# Patient Record
Sex: FEMALE | Race: WHITE | NOT HISPANIC OR LATINO
[De-identification: names, ages, dates, MRNs, and addresses within clinical notes are randomized per-mention and may not be internally consistent; named-entity substitution may affect disease eponyms.]

---

## 2017-01-07 ENCOUNTER — TRANSCRIPTION ENCOUNTER (OUTPATIENT)
Age: 52
End: 2017-01-07

## 2017-01-22 ENCOUNTER — TRANSCRIPTION ENCOUNTER (OUTPATIENT)
Age: 52
End: 2017-01-22

## 2017-02-22 ENCOUNTER — MEDICATION RENEWAL (OUTPATIENT)
Age: 52
End: 2017-02-22

## 2017-03-15 ENCOUNTER — APPOINTMENT (OUTPATIENT)
Dept: CARDIOLOGY | Facility: CLINIC | Age: 52
End: 2017-03-15

## 2017-03-15 ENCOUNTER — NON-APPOINTMENT (OUTPATIENT)
Age: 52
End: 2017-03-15

## 2017-03-15 VITALS
OXYGEN SATURATION: 99 % | SYSTOLIC BLOOD PRESSURE: 108 MMHG | HEART RATE: 64 BPM | WEIGHT: 113 LBS | BODY MASS INDEX: 19.29 KG/M2 | DIASTOLIC BLOOD PRESSURE: 70 MMHG | HEIGHT: 64 IN

## 2017-04-02 ENCOUNTER — TRANSCRIPTION ENCOUNTER (OUTPATIENT)
Age: 52
End: 2017-04-02

## 2017-04-06 ENCOUNTER — LABORATORY RESULT (OUTPATIENT)
Age: 52
End: 2017-04-06

## 2017-04-10 LAB
ALBUMIN SERPL ELPH-MCNC: 4.7 G/DL
ALP BLD-CCNC: 74 U/L
ALT SERPL-CCNC: 14 U/L
ANION GAP SERPL CALC-SCNC: 15 MMOL/L
AST SERPL-CCNC: 28 U/L
BASOPHILS # BLD AUTO: 0.05 K/UL
BASOPHILS NFR BLD AUTO: 0.7 %
BILIRUB SERPL-MCNC: 0.3 MG/DL
BUN SERPL-MCNC: 12 MG/DL
CALCIUM SERPL-MCNC: 9.8 MG/DL
CARDIOLIPIN AB SER IA-ACNC: POSITIVE
CARDIOLIPIN IGM SER-MCNC: 31 MPL
CARDIOLIPIN IGM SER-MCNC: <5 GPL
CHLORIDE SERPL-SCNC: 101 MMOL/L
CHOLEST SERPL-MCNC: 223 MG/DL
CHOLEST/HDLC SERPL: 2.3 RATIO
CO2 SERPL-SCNC: 27 MMOL/L
CREAT SERPL-MCNC: 0.83 MG/DL
CRP SERPL HS-MCNC: <0.2 MG/L
EOSINOPHIL # BLD AUTO: 0.22 K/UL
EOSINOPHIL NFR BLD AUTO: 3 %
GLUCOSE SERPL-MCNC: 71 MG/DL
HBA1C MFR BLD HPLC: 5.4 %
HCT VFR BLD CALC: 42.5 %
HDLC SERPL-MCNC: 99 MG/DL
HGB BLD-MCNC: 14.2 G/DL
IMM GRANULOCYTES NFR BLD AUTO: 0.1 %
LDLC SERPL CALC-MCNC: 106 MG/DL
LYMPHOCYTES # BLD AUTO: 2.56 K/UL
LYMPHOCYTES NFR BLD AUTO: 34.8 %
MAN DIFF?: NORMAL
MCHC RBC-ENTMCNC: 30.2 PG
MCHC RBC-ENTMCNC: 33.4 GM/DL
MCV RBC AUTO: 90.4 FL
MONOCYTES # BLD AUTO: 0.66 K/UL
MONOCYTES NFR BLD AUTO: 9 %
NEUTROPHILS # BLD AUTO: 3.86 K/UL
NEUTROPHILS NFR BLD AUTO: 52.4 %
PLATELET # BLD AUTO: 259 K/UL
POTASSIUM SERPL-SCNC: 4.7 MMOL/L
PROT SERPL-MCNC: 7.1 G/DL
RBC # BLD: 4.7 M/UL
RBC # FLD: 12.9 %
SODIUM SERPL-SCNC: 143 MMOL/L
T3RU NFR SERPL: 1.03 INDEX
T4 SERPL-MCNC: 6.2 UG/DL
TRIGL SERPL-MCNC: 91 MG/DL
TSH SERPL-ACNC: 1.55 UIU/ML
WBC # FLD AUTO: 7.36 K/UL

## 2017-07-11 ENCOUNTER — APPOINTMENT (OUTPATIENT)
Dept: CARDIOLOGY | Facility: CLINIC | Age: 52
End: 2017-07-11

## 2017-07-11 ENCOUNTER — NON-APPOINTMENT (OUTPATIENT)
Age: 52
End: 2017-07-11

## 2017-07-11 VITALS — OXYGEN SATURATION: 99 % | DIASTOLIC BLOOD PRESSURE: 68 MMHG | SYSTOLIC BLOOD PRESSURE: 105 MMHG | HEART RATE: 61 BPM

## 2017-07-12 ENCOUNTER — RESULT REVIEW (OUTPATIENT)
Age: 52
End: 2017-07-12

## 2017-07-13 ENCOUNTER — FORM ENCOUNTER (OUTPATIENT)
Age: 52
End: 2017-07-13

## 2017-07-13 ENCOUNTER — APPOINTMENT (OUTPATIENT)
Dept: MRI IMAGING | Facility: CLINIC | Age: 52
End: 2017-07-13

## 2017-07-14 ENCOUNTER — APPOINTMENT (OUTPATIENT)
Dept: MRI IMAGING | Facility: HOSPITAL | Age: 52
End: 2017-07-14

## 2017-07-14 ENCOUNTER — OUTPATIENT (OUTPATIENT)
Dept: OUTPATIENT SERVICES | Facility: HOSPITAL | Age: 52
LOS: 1 days | End: 2017-07-14
Payer: COMMERCIAL

## 2017-07-14 DIAGNOSIS — I77.9 DISORDER OF ARTERIES AND ARTERIOLES, UNSPECIFIED: ICD-10-CM

## 2017-07-14 DIAGNOSIS — Z90.49 ACQUIRED ABSENCE OF OTHER SPECIFIED PARTS OF DIGESTIVE TRACT: Chronic | ICD-10-CM

## 2017-07-14 DIAGNOSIS — M54.2 CERVICALGIA: ICD-10-CM

## 2017-07-14 DIAGNOSIS — G93.9 DISORDER OF BRAIN, UNSPECIFIED: ICD-10-CM

## 2017-07-14 PROCEDURE — 70546 MR ANGIOGRAPH HEAD W/O&W/DYE: CPT

## 2017-07-14 PROCEDURE — 70549 MR ANGIOGRAPH NECK W/O&W/DYE: CPT | Mod: 26

## 2017-07-14 PROCEDURE — 70549 MR ANGIOGRAPH NECK W/O&W/DYE: CPT

## 2017-07-14 PROCEDURE — A9585: CPT

## 2017-07-14 PROCEDURE — 70546 MR ANGIOGRAPH HEAD W/O&W/DYE: CPT | Mod: 26

## 2017-07-18 ENCOUNTER — RX RENEWAL (OUTPATIENT)
Age: 52
End: 2017-07-18

## 2017-07-27 ENCOUNTER — APPOINTMENT (OUTPATIENT)
Dept: SURGICAL ONCOLOGY | Facility: CLINIC | Age: 52
End: 2017-07-27
Payer: COMMERCIAL

## 2017-07-27 VITALS
BODY MASS INDEX: 19.12 KG/M2 | SYSTOLIC BLOOD PRESSURE: 100 MMHG | TEMPERATURE: 98.5 F | WEIGHT: 112 LBS | HEIGHT: 64 IN | DIASTOLIC BLOOD PRESSURE: 67 MMHG | OXYGEN SATURATION: 100 % | RESPIRATION RATE: 16 BRPM | HEART RATE: 64 BPM

## 2017-07-27 DIAGNOSIS — N61.0 MASTITIS WITHOUT ABSCESS: ICD-10-CM

## 2017-07-27 PROCEDURE — 99214 OFFICE O/P EST MOD 30 MIN: CPT

## 2017-08-16 ENCOUNTER — MEDICATION RENEWAL (OUTPATIENT)
Age: 52
End: 2017-08-16

## 2017-08-28 ENCOUNTER — EMERGENCY (EMERGENCY)
Facility: HOSPITAL | Age: 52
LOS: 1 days | Discharge: HOME | End: 2017-08-28
Attending: EMERGENCY MEDICINE | Admitting: EMERGENCY MEDICINE
Payer: COMMERCIAL

## 2017-08-28 VITALS
HEART RATE: 58 BPM | OXYGEN SATURATION: 100 % | RESPIRATION RATE: 20 BRPM | DIASTOLIC BLOOD PRESSURE: 62 MMHG | SYSTOLIC BLOOD PRESSURE: 106 MMHG | TEMPERATURE: 100 F

## 2017-08-28 DIAGNOSIS — Z90.49 ACQUIRED ABSENCE OF OTHER SPECIFIED PARTS OF DIGESTIVE TRACT: Chronic | ICD-10-CM

## 2017-08-28 PROCEDURE — 99284 EMERGENCY DEPT VISIT MOD MDM: CPT | Mod: 25

## 2017-08-28 PROCEDURE — 99284 EMERGENCY DEPT VISIT MOD MDM: CPT

## 2017-08-28 PROCEDURE — 93971 EXTREMITY STUDY: CPT

## 2017-08-28 PROCEDURE — 93971 EXTREMITY STUDY: CPT | Mod: 26

## 2017-08-28 NOTE — ED ADULT NURSE NOTE - OBJECTIVE STATEMENT
51 y.o female extensive cardiac hx- with MI in passed, carotid artery dissection, + cardiolipin antibodies currently on ASA daily c/o R. hand bruising and swelling x 1 day with no known injury. pt states she noticed this am the top of her right hand had mild bruising with not injury or trauma to the hand. pt states she was at the gym this evening lifting 5 lb weights which is not something new in pts exercise routine when she noticed swelling on the top of her right hand at the area of the bruise. pt states the swelling happened suddenly, painful upon palpation. was concerned with her pmh that she could possibly have a clot, denies any pain upon movement of the r. hand or wrist. + and equal radial pulses, sensory, and warmth present bilaterally. VS stable. no other pain or discomfort. provided pt with ice packs and offered pain medication but denies the need for pain medication at this time. call bell within reach and at the bedside. safety precautions maintained.

## 2017-08-28 NOTE — ED PROVIDER NOTE - MEDICAL DECISION MAKING DETAILS
Francis: Patient with right hand hematoma x 1 day. history of antiphospholipid. denies trauma. on daily aspirin.  stopped it for one week and restarted it four days ago. will get doppler to r/o dvt. reassess

## 2017-08-28 NOTE — ED PROVIDER NOTE - OBJECTIVE STATEMENT
51 year old female presents c/o right hand bruising and pain x 1 day. Patient states that she noticed bruising on her right hand this morning. She describes progressive swelling to the area throughout the day. Patient w/ h/o +anti-cardiolipin ab for which she takes aspirin 325 daily for. Patient denies trauma to her hand states she is concerned about possible blood clot given her history.

## 2017-08-28 NOTE — ED ADULT NURSE NOTE - PMH
Anticardiolipin Antibody Positive    CAD (Coronary Artery Disease)    History of Appendectomy    History of Bilateral Breast Implants    History of Hernia Repair    Myocardial Infarct    S/P Botox Injection

## 2017-08-28 NOTE — ED PROVIDER NOTE - PHYSICAL EXAMINATION
Patient w/ ecchymosis, swelling and tenderness  along the distal 4th metacarpal. Patient neurovascularly intact.

## 2017-09-14 ENCOUNTER — APPOINTMENT (OUTPATIENT)
Dept: CARDIOLOGY | Facility: CLINIC | Age: 52
End: 2017-09-14

## 2017-09-22 ENCOUNTER — TRANSCRIPTION ENCOUNTER (OUTPATIENT)
Age: 52
End: 2017-09-22

## 2017-10-24 ENCOUNTER — TRANSCRIPTION ENCOUNTER (OUTPATIENT)
Age: 52
End: 2017-10-24

## 2017-10-24 ENCOUNTER — FORM ENCOUNTER (OUTPATIENT)
Age: 52
End: 2017-10-24

## 2017-10-25 ENCOUNTER — APPOINTMENT (OUTPATIENT)
Dept: ULTRASOUND IMAGING | Facility: CLINIC | Age: 52
End: 2017-10-25
Payer: COMMERCIAL

## 2017-10-25 ENCOUNTER — OUTPATIENT (OUTPATIENT)
Dept: OUTPATIENT SERVICES | Facility: HOSPITAL | Age: 52
LOS: 1 days | End: 2017-10-25
Payer: COMMERCIAL

## 2017-10-25 ENCOUNTER — APPOINTMENT (OUTPATIENT)
Dept: MAMMOGRAPHY | Facility: CLINIC | Age: 52
End: 2017-10-25
Payer: COMMERCIAL

## 2017-10-25 DIAGNOSIS — Z00.8 ENCOUNTER FOR OTHER GENERAL EXAMINATION: ICD-10-CM

## 2017-10-25 DIAGNOSIS — Z90.49 ACQUIRED ABSENCE OF OTHER SPECIFIED PARTS OF DIGESTIVE TRACT: Chronic | ICD-10-CM

## 2017-10-25 PROCEDURE — 76641 ULTRASOUND BREAST COMPLETE: CPT | Mod: 26,50

## 2017-10-25 PROCEDURE — 76641 ULTRASOUND BREAST COMPLETE: CPT

## 2017-10-25 PROCEDURE — 77067 SCR MAMMO BI INCL CAD: CPT

## 2017-10-25 PROCEDURE — 77063 BREAST TOMOSYNTHESIS BI: CPT | Mod: 26

## 2017-10-25 PROCEDURE — G0202: CPT | Mod: 26

## 2017-10-25 PROCEDURE — 77063 BREAST TOMOSYNTHESIS BI: CPT

## 2017-11-08 ENCOUNTER — APPOINTMENT (OUTPATIENT)
Dept: CARDIOLOGY | Facility: CLINIC | Age: 52
End: 2017-11-08

## 2017-11-09 ENCOUNTER — APPOINTMENT (OUTPATIENT)
Dept: CARDIOLOGY | Facility: CLINIC | Age: 52
End: 2017-11-09
Payer: COMMERCIAL

## 2017-11-29 ENCOUNTER — APPOINTMENT (OUTPATIENT)
Dept: CARDIOLOGY | Facility: CLINIC | Age: 52
End: 2017-11-29
Payer: COMMERCIAL

## 2017-11-29 ENCOUNTER — OTHER (OUTPATIENT)
Age: 52
End: 2017-11-29

## 2017-11-29 ENCOUNTER — TRANSCRIPTION ENCOUNTER (OUTPATIENT)
Age: 52
End: 2017-11-29

## 2017-11-29 PROCEDURE — 93268 ECG RECORD/REVIEW: CPT

## 2017-12-05 ENCOUNTER — APPOINTMENT (OUTPATIENT)
Dept: CARDIOLOGY | Facility: CLINIC | Age: 52
End: 2017-12-05
Payer: COMMERCIAL

## 2017-12-05 PROCEDURE — 93015 CV STRESS TEST SUPVJ I&R: CPT

## 2018-01-08 ENCOUNTER — NON-APPOINTMENT (OUTPATIENT)
Age: 53
End: 2018-01-08

## 2018-01-08 ENCOUNTER — APPOINTMENT (OUTPATIENT)
Dept: CARDIOLOGY | Facility: CLINIC | Age: 53
End: 2018-01-08
Payer: COMMERCIAL

## 2018-01-08 VITALS
OXYGEN SATURATION: 97 % | BODY MASS INDEX: 18.78 KG/M2 | SYSTOLIC BLOOD PRESSURE: 122 MMHG | HEIGHT: 64 IN | HEART RATE: 60 BPM | DIASTOLIC BLOOD PRESSURE: 84 MMHG | WEIGHT: 110 LBS

## 2018-01-08 PROCEDURE — 99215 OFFICE O/P EST HI 40 MIN: CPT

## 2018-01-08 PROCEDURE — 93000 ELECTROCARDIOGRAM COMPLETE: CPT

## 2018-01-23 ENCOUNTER — TRANSCRIPTION ENCOUNTER (OUTPATIENT)
Age: 53
End: 2018-01-23

## 2018-03-01 ENCOUNTER — APPOINTMENT (OUTPATIENT)
Dept: OBGYN | Facility: CLINIC | Age: 53
End: 2018-03-01
Payer: COMMERCIAL

## 2018-03-01 PROCEDURE — 99204 OFFICE O/P NEW MOD 45 MIN: CPT

## 2018-03-20 ENCOUNTER — APPOINTMENT (OUTPATIENT)
Dept: OBGYN | Facility: CLINIC | Age: 53
End: 2018-03-20

## 2018-04-04 ENCOUNTER — APPOINTMENT (OUTPATIENT)
Dept: ORTHOPEDIC SURGERY | Facility: CLINIC | Age: 53
End: 2018-04-04
Payer: COMMERCIAL

## 2018-04-04 VITALS
SYSTOLIC BLOOD PRESSURE: 104 MMHG | TEMPERATURE: 98.1 F | HEART RATE: 66 BPM | DIASTOLIC BLOOD PRESSURE: 67 MMHG | BODY MASS INDEX: 18.78 KG/M2 | WEIGHT: 110 LBS | HEIGHT: 64 IN

## 2018-04-04 PROCEDURE — 99204 OFFICE O/P NEW MOD 45 MIN: CPT

## 2018-04-04 PROCEDURE — 73140 X-RAY EXAM OF FINGER(S): CPT | Mod: F9

## 2018-06-08 ENCOUNTER — APPOINTMENT (OUTPATIENT)
Dept: OBGYN | Facility: CLINIC | Age: 53
End: 2018-06-08
Payer: COMMERCIAL

## 2018-06-08 PROCEDURE — 99214 OFFICE O/P EST MOD 30 MIN: CPT

## 2018-07-02 ENCOUNTER — APPOINTMENT (OUTPATIENT)
Dept: CARDIOLOGY | Facility: CLINIC | Age: 53
End: 2018-07-02
Payer: COMMERCIAL

## 2018-07-02 ENCOUNTER — NON-APPOINTMENT (OUTPATIENT)
Age: 53
End: 2018-07-02

## 2018-07-02 VITALS
HEART RATE: 59 BPM | HEIGHT: 64 IN | OXYGEN SATURATION: 100 % | BODY MASS INDEX: 18.78 KG/M2 | DIASTOLIC BLOOD PRESSURE: 71 MMHG | SYSTOLIC BLOOD PRESSURE: 110 MMHG | WEIGHT: 110 LBS

## 2018-07-02 PROCEDURE — 99214 OFFICE O/P EST MOD 30 MIN: CPT

## 2018-07-02 PROCEDURE — 93000 ELECTROCARDIOGRAM COMPLETE: CPT

## 2018-07-29 ENCOUNTER — FORM ENCOUNTER (OUTPATIENT)
Age: 53
End: 2018-07-29

## 2018-07-30 ENCOUNTER — OUTPATIENT (OUTPATIENT)
Dept: OUTPATIENT SERVICES | Facility: HOSPITAL | Age: 53
LOS: 1 days | End: 2018-07-30
Payer: COMMERCIAL

## 2018-07-30 ENCOUNTER — APPOINTMENT (OUTPATIENT)
Dept: CARDIOLOGY | Facility: CLINIC | Age: 53
End: 2018-07-30
Payer: COMMERCIAL

## 2018-07-30 VITALS
HEART RATE: 64 BPM | DIASTOLIC BLOOD PRESSURE: 66 MMHG | OXYGEN SATURATION: 100 % | SYSTOLIC BLOOD PRESSURE: 96 MMHG | HEIGHT: 64 IN

## 2018-07-30 DIAGNOSIS — Z90.49 ACQUIRED ABSENCE OF OTHER SPECIFIED PARTS OF DIGESTIVE TRACT: Chronic | ICD-10-CM

## 2018-07-30 DIAGNOSIS — M54.2 CERVICALGIA: ICD-10-CM

## 2018-07-30 PROCEDURE — 99214 OFFICE O/P EST MOD 30 MIN: CPT

## 2018-07-30 PROCEDURE — 93880 EXTRACRANIAL BILAT STUDY: CPT | Mod: 26

## 2018-07-30 PROCEDURE — 93880 EXTRACRANIAL BILAT STUDY: CPT

## 2018-09-11 ENCOUNTER — RESULT REVIEW (OUTPATIENT)
Age: 53
End: 2018-09-11

## 2018-09-14 ENCOUNTER — APPOINTMENT (OUTPATIENT)
Dept: UROLOGY | Facility: CLINIC | Age: 53
End: 2018-09-14

## 2018-09-16 ENCOUNTER — TRANSCRIPTION ENCOUNTER (OUTPATIENT)
Age: 53
End: 2018-09-16

## 2018-09-21 ENCOUNTER — APPOINTMENT (OUTPATIENT)
Dept: DERMATOLOGY | Facility: CLINIC | Age: 53
End: 2018-09-21

## 2018-09-26 ENCOUNTER — APPOINTMENT (OUTPATIENT)
Dept: UROLOGY | Facility: CLINIC | Age: 53
End: 2018-09-26

## 2018-10-03 ENCOUNTER — MEDICATION RENEWAL (OUTPATIENT)
Age: 53
End: 2018-10-03

## 2018-10-25 ENCOUNTER — APPOINTMENT (OUTPATIENT)
Dept: MRI IMAGING | Facility: CLINIC | Age: 53
End: 2018-10-25
Payer: COMMERCIAL

## 2018-10-25 ENCOUNTER — OUTPATIENT (OUTPATIENT)
Dept: OUTPATIENT SERVICES | Facility: HOSPITAL | Age: 53
LOS: 1 days | End: 2018-10-25
Payer: COMMERCIAL

## 2018-10-25 DIAGNOSIS — Z00.8 ENCOUNTER FOR OTHER GENERAL EXAMINATION: ICD-10-CM

## 2018-10-25 DIAGNOSIS — Z90.49 ACQUIRED ABSENCE OF OTHER SPECIFIED PARTS OF DIGESTIVE TRACT: Chronic | ICD-10-CM

## 2018-10-25 PROCEDURE — 72197 MRI PELVIS W/O & W/DYE: CPT

## 2018-10-25 PROCEDURE — 82565 ASSAY OF CREATININE: CPT

## 2018-10-25 PROCEDURE — A9585: CPT

## 2018-10-25 PROCEDURE — 72197 MRI PELVIS W/O & W/DYE: CPT | Mod: 26

## 2018-10-26 ENCOUNTER — APPOINTMENT (OUTPATIENT)
Dept: UROLOGY | Facility: CLINIC | Age: 53
End: 2018-10-26

## 2018-11-19 ENCOUNTER — RX RENEWAL (OUTPATIENT)
Age: 53
End: 2018-11-19

## 2018-11-27 ENCOUNTER — APPOINTMENT (OUTPATIENT)
Dept: OBGYN | Facility: CLINIC | Age: 53
End: 2018-11-27
Payer: COMMERCIAL

## 2018-11-27 PROCEDURE — 36415 COLL VENOUS BLD VENIPUNCTURE: CPT

## 2018-11-27 PROCEDURE — 99214 OFFICE O/P EST MOD 30 MIN: CPT

## 2018-12-05 ENCOUNTER — APPOINTMENT (OUTPATIENT)
Dept: GASTROENTEROLOGY | Facility: CLINIC | Age: 53
End: 2018-12-05
Payer: COMMERCIAL

## 2018-12-05 VITALS
HEIGHT: 64 IN | RESPIRATION RATE: 14 BRPM | TEMPERATURE: 97.5 F | DIASTOLIC BLOOD PRESSURE: 62 MMHG | SYSTOLIC BLOOD PRESSURE: 98 MMHG | WEIGHT: 114 LBS | BODY MASS INDEX: 19.46 KG/M2 | HEART RATE: 66 BPM | OXYGEN SATURATION: 99 %

## 2018-12-05 PROCEDURE — 99214 OFFICE O/P EST MOD 30 MIN: CPT

## 2018-12-10 ENCOUNTER — MESSAGE (OUTPATIENT)
Age: 53
End: 2018-12-10

## 2018-12-19 ENCOUNTER — MESSAGE (OUTPATIENT)
Age: 53
End: 2018-12-19

## 2018-12-20 ENCOUNTER — CLINICAL ADVICE (OUTPATIENT)
Age: 53
End: 2018-12-20

## 2018-12-20 ENCOUNTER — NON-APPOINTMENT (OUTPATIENT)
Age: 53
End: 2018-12-20

## 2018-12-20 ENCOUNTER — FORM ENCOUNTER (OUTPATIENT)
Age: 53
End: 2018-12-20

## 2018-12-20 ENCOUNTER — APPOINTMENT (OUTPATIENT)
Dept: CARDIOLOGY | Facility: CLINIC | Age: 53
End: 2018-12-20
Payer: COMMERCIAL

## 2018-12-20 VITALS
HEART RATE: 65 BPM | BODY MASS INDEX: 19.63 KG/M2 | WEIGHT: 115 LBS | SYSTOLIC BLOOD PRESSURE: 113 MMHG | HEIGHT: 64 IN | OXYGEN SATURATION: 100 % | DIASTOLIC BLOOD PRESSURE: 73 MMHG

## 2018-12-20 PROCEDURE — 93000 ELECTROCARDIOGRAM COMPLETE: CPT

## 2018-12-20 PROCEDURE — 99214 OFFICE O/P EST MOD 30 MIN: CPT

## 2018-12-20 NOTE — HISTORY OF PRESENT ILLNESS
[FreeTextEntry1] : Mrs. Stokes is a 52-year-old woman with history of anticardiolipin antibody, an anterior wall MI, with coronary angiography that was typical for apical ballooning syndrome, however it was felt to be secondary to vasospasm. She has had normal LV function since then.  She also has a history of bilateral carotid dissections that completely healed, as well as a recent diagnosis of FMD of the right renal artery, and Hashimoto's thyroiditis.  She had previously been on AC with Coumadin, but is currently only on aspirin therapy.  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 She denied increased stress and has been developing pains in her back and pains in her chest. It occurred randomly and not related to physical activity. She also is on medication for her stomach. The symptoms do not sound or feel like what she had with the heart attack in 2005. We know that she has normal coronaries and has had normal stress test\par \par Her resting 12-lead cardiogram demonstrates sinus rhythm without any acute change.\par . \par She had an echocardiogram 7/16 that showed normal left ventricular systolic function. Blood work from 3/17 demonstrated a cholesterol of 223, triglycerides 91, HDL 99, and . CRP was less than 0.2. Anticardiolipin antibody IgM was elevated at 31.She had a nuclear stress test 12/17 that was normal total workload of 12 Mets.\par \par MRA scan of the neck 7/17 was normal. She had a CT angiogram of the heart 3/16 showed a calcium score 0 without any coronary disease.She had a recent carotid Doppler that according to the patient demonstrates mild plaque.\par \par A resting 12-lead electrocardiogram demonstrates sinus rhythm. There is poor R. progression in the anterior leads with nonspecific T waves. This represents no acute change.\par \par

## 2018-12-20 NOTE — REVIEW OF SYSTEMS
[Feeling Fatigued] : feeling fatigued [see HPI] : see HPI [Nausea] : nausea [Negative] : Heme/Lymph [Shortness Of Breath] : no shortness of breath [Chest Pain] : no chest pain [Lower Ext Edema] : no extremity edema [Palpitations] : no palpitations [Dysuria] : no dysuria [Incontinence] : no incontinence

## 2018-12-20 NOTE — CARDIOLOGY SUMMARY
[No Ischemia] : no Ischemia [LVEF ___%] : LVEF [unfilled]% [Normal] : normal LA size [None] : no mitral regurgitation [___] : [unfilled] [___] : [unfilled]

## 2018-12-20 NOTE — REASON FOR VISIT
[Follow-Up - Clinic] : a clinic follow-up of [Prior Myocardial Infarction] : a prior myocardial infarction [Syncope] : syncope [FreeTextEntry1] : Antiphospholipid antibody, Takotsubo syndrome

## 2018-12-20 NOTE — DISCUSSION/SUMMARY
[FreeTextEntry1] : The patient's symptoms of back and chest pain are most likely stress induced. The pains occur randomly and not related to any physical or emotional stress. They only last for a short period of time. Her exam and ECG are unchanged. Vital signs are stable\par \par I reassured her that this is not cardiac. If she has further symptoms she will investigate with her other doctors. She had blood work with the rheumatologist in July with extensive testing of her rheumatologic and autoimmune system that were unremarkable.\par \par If she continues to have symptoms she will call me.

## 2018-12-21 ENCOUNTER — RESULT REVIEW (OUTPATIENT)
Age: 53
End: 2018-12-21

## 2018-12-21 ENCOUNTER — MESSAGE (OUTPATIENT)
Age: 53
End: 2018-12-21

## 2018-12-21 ENCOUNTER — APPOINTMENT (OUTPATIENT)
Dept: CT IMAGING | Facility: CLINIC | Age: 53
End: 2018-12-21
Payer: COMMERCIAL

## 2018-12-21 ENCOUNTER — TRANSCRIPTION ENCOUNTER (OUTPATIENT)
Age: 53
End: 2018-12-21

## 2018-12-21 ENCOUNTER — OUTPATIENT (OUTPATIENT)
Dept: OUTPATIENT SERVICES | Facility: HOSPITAL | Age: 53
LOS: 1 days | End: 2018-12-21
Payer: COMMERCIAL

## 2018-12-21 DIAGNOSIS — Z90.49 ACQUIRED ABSENCE OF OTHER SPECIFIED PARTS OF DIGESTIVE TRACT: Chronic | ICD-10-CM

## 2018-12-21 DIAGNOSIS — Z00.8 ENCOUNTER FOR OTHER GENERAL EXAMINATION: ICD-10-CM

## 2018-12-21 PROCEDURE — 74176 CT ABD & PELVIS W/O CONTRAST: CPT | Mod: 26

## 2018-12-21 PROCEDURE — 74176 CT ABD & PELVIS W/O CONTRAST: CPT

## 2019-01-02 ENCOUNTER — APPOINTMENT (OUTPATIENT)
Dept: OBGYN | Facility: CLINIC | Age: 54
End: 2019-01-02

## 2019-01-14 ENCOUNTER — OTHER (OUTPATIENT)
Age: 54
End: 2019-01-14

## 2019-01-14 ENCOUNTER — APPOINTMENT (OUTPATIENT)
Dept: GASTROENTEROLOGY | Facility: CLINIC | Age: 54
End: 2019-01-14
Payer: COMMERCIAL

## 2019-01-14 VITALS
WEIGHT: 115 LBS | HEART RATE: 65 BPM | BODY MASS INDEX: 19.74 KG/M2 | TEMPERATURE: 97.6 F | DIASTOLIC BLOOD PRESSURE: 74 MMHG | OXYGEN SATURATION: 99 % | SYSTOLIC BLOOD PRESSURE: 110 MMHG

## 2019-01-14 PROCEDURE — 99214 OFFICE O/P EST MOD 30 MIN: CPT

## 2019-01-14 NOTE — ASSESSMENT
[FreeTextEntry1] : This is a 53-year-old female with chronic constipation, most probable IBS component. I recommend Linzess 290 mcg one hour before breakfast, miralax with dinner, dulcolax or senna as needed. I recommend anorectal manometry to evaluate for possible dyssynergy defecation. She may need pelvic floor rehab pending result of anorectal manometry. She reports that she had pelvic floor rehab once for painful sexual intercourse but did not go back.

## 2019-01-14 NOTE — HISTORY OF PRESENT ILLNESS
[FreeTextEntry1] : Larissa Since her followup visit. She reports continued constipation. However on further questioning she states that when she went back on Linzess it did give her diarrhea. When she was in [place] she took it with good response. But then she goes back saying that she has constipation, feeling bloated and uncomfortable. There are times when she can go for a week without having a bowel movement, but then she goes back to saying that she did have diarrhea a few days ago. I reviewed with the results of CT abdomen and pelvis without IV contrast in December showing no bowel obstruction. She underwent a colonoscopy 2016 with a tubular adenoma, redundant colon. Repeat a colonoscopy at this time would not change or constipation. I recommend anorectal manometry for further evaluation of the constipation. Review of her chart reveals blood work performed July 2018 with normal electrolytes and TSH. She admits to be under stress due to work and home situation.

## 2019-01-14 NOTE — PHYSICAL EXAM
[General Appearance - Alert] : alert [General Appearance - In No Acute Distress] : in no acute distress [Auscultation Breath Sounds / Voice Sounds] : lungs were clear to auscultation bilaterally [Heart Rate And Rhythm] : heart rate was normal and rhythm regular [Heart Sounds] : normal S1 and S2 [Heart Sounds Gallop] : no gallops [Murmurs] : no murmurs [Heart Sounds Pericardial Friction Rub] : no pericardial rub [Edema] : there was no peripheral edema [Bowel Sounds] : normal bowel sounds [Abdomen Soft] : soft [Abdomen Tenderness] : non-tender [] : no hepato-splenomegaly [Abdomen Mass (___ Cm)] : no abdominal mass palpated [FreeTextEntry1] : soft and non-tender [Skin Color & Pigmentation] : normal skin color and pigmentation [No Focal Deficits] : no focal deficits

## 2019-01-29 ENCOUNTER — OTHER (OUTPATIENT)
Age: 54
End: 2019-01-29

## 2019-01-29 ENCOUNTER — RX RENEWAL (OUTPATIENT)
Age: 54
End: 2019-01-29

## 2019-03-07 ENCOUNTER — APPOINTMENT (OUTPATIENT)
Dept: CARDIOLOGY | Facility: CLINIC | Age: 54
End: 2019-03-07
Payer: COMMERCIAL

## 2019-03-07 ENCOUNTER — NON-APPOINTMENT (OUTPATIENT)
Age: 54
End: 2019-03-07

## 2019-03-07 PROCEDURE — 93000 ELECTROCARDIOGRAM COMPLETE: CPT

## 2019-03-08 ENCOUNTER — NON-APPOINTMENT (OUTPATIENT)
Age: 54
End: 2019-03-08

## 2019-03-21 ENCOUNTER — APPOINTMENT (OUTPATIENT)
Dept: DERMATOLOGY | Facility: CLINIC | Age: 54
End: 2019-03-21

## 2019-04-22 ENCOUNTER — APPOINTMENT (OUTPATIENT)
Dept: OBGYN | Facility: CLINIC | Age: 54
End: 2019-04-22
Payer: COMMERCIAL

## 2019-04-22 PROCEDURE — 99214 OFFICE O/P EST MOD 30 MIN: CPT

## 2019-05-02 ENCOUNTER — RX RENEWAL (OUTPATIENT)
Age: 54
End: 2019-05-02

## 2019-05-08 ENCOUNTER — OUTPATIENT (OUTPATIENT)
Dept: OUTPATIENT SERVICES | Facility: HOSPITAL | Age: 54
LOS: 1 days | End: 2019-05-08
Payer: COMMERCIAL

## 2019-05-08 ENCOUNTER — APPOINTMENT (OUTPATIENT)
Dept: MRI IMAGING | Facility: CLINIC | Age: 54
End: 2019-05-08
Payer: COMMERCIAL

## 2019-05-08 DIAGNOSIS — Z00.8 ENCOUNTER FOR OTHER GENERAL EXAMINATION: ICD-10-CM

## 2019-05-08 DIAGNOSIS — Z90.49 ACQUIRED ABSENCE OF OTHER SPECIFIED PARTS OF DIGESTIVE TRACT: Chronic | ICD-10-CM

## 2019-05-08 PROCEDURE — 70544 MR ANGIOGRAPHY HEAD W/O DYE: CPT | Mod: 26

## 2019-05-08 PROCEDURE — 70549 MR ANGIOGRAPH NECK W/O&W/DYE: CPT | Mod: 26

## 2019-05-08 PROCEDURE — 70549 MR ANGIOGRAPH NECK W/O&W/DYE: CPT

## 2019-05-08 PROCEDURE — 70544 MR ANGIOGRAPHY HEAD W/O DYE: CPT

## 2019-05-08 PROCEDURE — A9585: CPT

## 2019-05-15 ENCOUNTER — APPOINTMENT (OUTPATIENT)
Dept: CARDIOLOGY | Facility: CLINIC | Age: 54
End: 2019-05-15

## 2019-05-20 ENCOUNTER — OUTPATIENT (OUTPATIENT)
Dept: OUTPATIENT SERVICES | Facility: HOSPITAL | Age: 54
LOS: 1 days | End: 2019-05-20
Payer: COMMERCIAL

## 2019-05-20 ENCOUNTER — APPOINTMENT (OUTPATIENT)
Dept: ULTRASOUND IMAGING | Facility: CLINIC | Age: 54
End: 2019-05-20
Payer: COMMERCIAL

## 2019-05-20 ENCOUNTER — APPOINTMENT (OUTPATIENT)
Dept: MAMMOGRAPHY | Facility: CLINIC | Age: 54
End: 2019-05-20
Payer: COMMERCIAL

## 2019-05-20 DIAGNOSIS — Z00.8 ENCOUNTER FOR OTHER GENERAL EXAMINATION: ICD-10-CM

## 2019-05-20 DIAGNOSIS — Z90.49 ACQUIRED ABSENCE OF OTHER SPECIFIED PARTS OF DIGESTIVE TRACT: Chronic | ICD-10-CM

## 2019-05-20 PROCEDURE — 77063 BREAST TOMOSYNTHESIS BI: CPT | Mod: 26

## 2019-05-20 PROCEDURE — 77067 SCR MAMMO BI INCL CAD: CPT

## 2019-05-20 PROCEDURE — 77067 SCR MAMMO BI INCL CAD: CPT | Mod: 26

## 2019-05-20 PROCEDURE — 76641 ULTRASOUND BREAST COMPLETE: CPT | Mod: 26,50

## 2019-05-20 PROCEDURE — 77063 BREAST TOMOSYNTHESIS BI: CPT

## 2019-05-20 PROCEDURE — 76641 ULTRASOUND BREAST COMPLETE: CPT

## 2019-06-12 ENCOUNTER — APPOINTMENT (OUTPATIENT)
Dept: SURGERY | Facility: CLINIC | Age: 54
End: 2019-06-12
Payer: COMMERCIAL

## 2019-06-12 PROCEDURE — 99205K: CUSTOM

## 2019-06-17 ENCOUNTER — APPOINTMENT (OUTPATIENT)
Dept: GASTROENTEROLOGY | Facility: CLINIC | Age: 54
End: 2019-06-17
Payer: COMMERCIAL

## 2019-06-17 VITALS
DIASTOLIC BLOOD PRESSURE: 70 MMHG | WEIGHT: 112 LBS | HEIGHT: 64 IN | HEART RATE: 56 BPM | SYSTOLIC BLOOD PRESSURE: 106 MMHG | BODY MASS INDEX: 19.12 KG/M2 | OXYGEN SATURATION: 99 %

## 2019-06-17 PROCEDURE — 99213 OFFICE O/P EST LOW 20 MIN: CPT

## 2019-06-17 NOTE — ASSESSMENT
[FreeTextEntry1] : This is a 53-year-old female with chronic constipation with improvement on Linzess 290 mcg daily. I recommend adding MiraLax at night. She is inquiring about a colonoscopy. Her last colonoscopy in May 2016 with a tubular adenoma. She can start taking the colonoscopy sometime this year up to 2 years from now. She states that she will be undergoing breast surgery in July. I recommend holding off on the colonoscopy until she has recovered from the surgery. She states that she will call when she is ready to schedule the colonoscopy.

## 2019-07-02 ENCOUNTER — APPOINTMENT (OUTPATIENT)
Dept: UROLOGY | Facility: CLINIC | Age: 54
End: 2019-07-02
Payer: COMMERCIAL

## 2019-07-02 PROCEDURE — 99205 OFFICE O/P NEW HI 60 MIN: CPT | Mod: 25

## 2019-07-02 PROCEDURE — 51798 US URINE CAPACITY MEASURE: CPT

## 2019-07-02 NOTE — REVIEW OF SYSTEMS
[Feeling Tired] : feeling tired [Recent Weight Loss (___ Lbs)] : recent [unfilled] ~Ulb weight loss [Dry Eyes] : dryness of the eyes [Painful Snelling] : painful Snelling [Constipation] : constipation [Strong urge to urinate] : strong urge to urinate [Hot Flashes] : hot flashes [Easy Bleeding] : a tendency for easy bleeding [Easy Bruising] : a tendency for easy bruising [Negative] : Psychiatric [FreeTextEntry2] : Nausea

## 2019-07-03 ENCOUNTER — APPOINTMENT (OUTPATIENT)
Dept: CARDIOLOGY | Facility: CLINIC | Age: 54
End: 2019-07-03

## 2019-07-03 LAB
APPEARANCE: CLEAR
BACTERIA: NEGATIVE
BILIRUBIN URINE: NEGATIVE
BLOOD URINE: NEGATIVE
COLOR: COLORLESS
GLUCOSE QUALITATIVE U: NEGATIVE
HYALINE CASTS: 0 /LPF
KETONES URINE: NEGATIVE
LEUKOCYTE ESTERASE URINE: NEGATIVE
MICROSCOPIC-UA: NORMAL
NITRITE URINE: NEGATIVE
PH URINE: 7
PROTEIN URINE: NEGATIVE
RED BLOOD CELLS URINE: 0 /HPF
SPECIFIC GRAVITY URINE: 1.01
SQUAMOUS EPITHELIAL CELLS: 0 /HPF
UROBILINOGEN URINE: NORMAL
WHITE BLOOD CELLS URINE: 0 /HPF

## 2019-07-09 ENCOUNTER — OUTPATIENT (OUTPATIENT)
Dept: OUTPATIENT SERVICES | Facility: HOSPITAL | Age: 54
LOS: 1 days | End: 2019-07-09
Payer: COMMERCIAL

## 2019-07-09 ENCOUNTER — APPOINTMENT (OUTPATIENT)
Dept: MRI IMAGING | Facility: CLINIC | Age: 54
End: 2019-07-09
Payer: COMMERCIAL

## 2019-07-09 DIAGNOSIS — Z90.49 ACQUIRED ABSENCE OF OTHER SPECIFIED PARTS OF DIGESTIVE TRACT: Chronic | ICD-10-CM

## 2019-07-09 DIAGNOSIS — Z00.8 ENCOUNTER FOR OTHER GENERAL EXAMINATION: ICD-10-CM

## 2019-07-09 PROCEDURE — A9585: CPT

## 2019-07-09 PROCEDURE — C8908: CPT

## 2019-07-09 PROCEDURE — C8937: CPT

## 2019-07-09 PROCEDURE — 77049 MRI BREAST C-+ W/CAD BI: CPT | Mod: 26

## 2019-07-11 ENCOUNTER — APPOINTMENT (OUTPATIENT)
Dept: ULTRASOUND IMAGING | Facility: CLINIC | Age: 54
End: 2019-07-11

## 2019-07-23 ENCOUNTER — MEDICATION RENEWAL (OUTPATIENT)
Age: 54
End: 2019-07-23

## 2019-07-30 ENCOUNTER — APPOINTMENT (OUTPATIENT)
Dept: MRI IMAGING | Facility: IMAGING CENTER | Age: 54
End: 2019-07-30

## 2019-08-08 ENCOUNTER — APPOINTMENT (OUTPATIENT)
Dept: CARDIOLOGY | Facility: CLINIC | Age: 54
End: 2019-08-08

## 2019-08-08 NOTE — REVIEW OF SYSTEMS
[Feeling Fatigued] : feeling fatigued [see HPI] : see HPI [Nausea] : nausea [Negative] : Heme/Lymph [Shortness Of Breath] : no shortness of breath [Lower Ext Edema] : no extremity edema [Chest Pain] : no chest pain [Palpitations] : no palpitations [Dysuria] : no dysuria [Incontinence] : no incontinence

## 2019-08-08 NOTE — HISTORY OF PRESENT ILLNESS
[FreeTextEntry1] : Mrs. Stokes is a 53-year-old woman with history of anticardiolipin antibody, an anterior wall MI, with coronary angiography that was typical for apical ballooning syndrome, however it was felt to be secondary to vasospasm. She has had normal LV function since then.  She also has a history of bilateral carotid dissections that completely healed, as well as a recent diagnosis of FMD of the right renal artery, and Hashimoto's thyroiditis.  She had previously been on AC with Coumadin, but is currently only on aspirin therapy.  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 She denied increased stress and has been developing pains in her back and pains in her chest. It occurred randomly and not related to physical activity. She also is on medication for her stomach. The symptoms do not sound or feel like what she had with the heart attack in 2005. We know that she has normal coronaries and has had normal stress test\par \par Her resting 12-lead cardiogram demonstrates sinus rhythm without any acute change.\par . \par She had an echocardiogram 7/16 that showed normal left ventricular systolic function. Blood work from 3/17 demonstrated a cholesterol of 223, triglycerides 91, HDL 99, and . CRP was less than 0.2. Anticardiolipin antibody IgM was elevated at 31.She had a nuclear stress test 12/17 that was normal total workload of 12 Mets.\par \par MRA scan of the neck 7/17 was normal. She had a CT angiogram of the heart 3/16 showed a calcium score 0 without any coronary disease.She had a recent carotid Doppler that according to the patient demonstrates mild plaque.\par \par A resting 12-lead electrocardiogram demonstrates sinus rhythm. There is poor R. progression in the anterior leads with nonspecific T waves. This represents no acute change.\par \par

## 2019-08-08 NOTE — CARDIOLOGY SUMMARY
[No Ischemia] : no Ischemia [LVEF ___%] : LVEF [unfilled]% [Normal] : normal LA size [None] : no mitral regurgitation [___] : [unfilled]

## 2019-08-08 NOTE — PHYSICAL EXAM
[Normal Appearance] : normal appearance [Well Groomed] : well groomed [Normal Conjunctiva] : the conjunctiva exhibited no abnormalities [General Appearance - In No Acute Distress] : no acute distress [Eyelids - No Xanthelasma] : the eyelids demonstrated no xanthelasmas [Normal Oral Mucosa] : normal oral mucosa [No Oral Pallor] : no oral pallor [No Oral Cyanosis] : no oral cyanosis [Normal Jugular Venous A Waves Present] : normal jugular venous A waves present [Normal Oropharynx] : normal oropharynx [No Jugular Venous Cárdenas A Waves] : no jugular venous cárdenas A waves [Normal Jugular Venous V Waves Present] : normal jugular venous V waves present [Auscultation Breath Sounds / Voice Sounds] : lungs were clear to auscultation bilaterally [Respiration, Rhythm And Depth] : normal respiratory rhythm and effort [Exaggerated Use Of Accessory Muscles For Inspiration] : no accessory muscle use [Bowel Sounds] : normal bowel sounds [Abdomen Soft] : soft [Abdomen Tenderness] : non-tender [Abdomen Mass (___ Cm)] : no abdominal mass palpated [Gait - Sufficient For Exercise Testing] : the gait was sufficient for exercise testing [Abnormal Walk] : normal gait [Cyanosis, Localized] : no localized cyanosis [Nail Clubbing] : no clubbing of the fingernails [Petechial Hemorrhages (___cm)] : no petechial hemorrhages [Skin Color & Pigmentation] : normal skin color and pigmentation [No Venous Stasis] : no venous stasis [] : no rash [Skin Lesions] : no skin lesions [Oriented To Time, Place, And Person] : oriented to person, place, and time [Affect] : the affect was normal [Mood] : the mood was normal [No Anxiety] : not feeling anxious [Normal] : normal [5th Left ICS - MCL] : palpated at the 5th LICS in the midclavicular line [No Precordial Heave] : no precordial heave was noted [Normal Rate] : normal [Rhythm Regular] : regular [Normal S1] : normal S1 [No Gallop] : no gallop heard [Normal S2] : normal S2 [I] : a grade 1 [2+] : right 2+ [No Abnormalities] : the abdominal aorta was not enlarged and no bruit was heard [No Pitting Edema] : no pitting edema present [FreeTextEntry1] : No abdominal bruits [S3] : no S3 [S4] : no S4 [Right Carotid Bruit] : no bruit heard over the right carotid [Left Carotid Bruit] : no bruit heard over the left carotid

## 2019-08-15 ENCOUNTER — APPOINTMENT (OUTPATIENT)
Dept: MRI IMAGING | Facility: IMAGING CENTER | Age: 54
End: 2019-08-15
Payer: COMMERCIAL

## 2019-08-15 ENCOUNTER — OUTPATIENT (OUTPATIENT)
Dept: OUTPATIENT SERVICES | Facility: HOSPITAL | Age: 54
LOS: 1 days | End: 2019-08-15
Payer: SELF-PAY

## 2019-08-15 DIAGNOSIS — Z00.8 ENCOUNTER FOR OTHER GENERAL EXAMINATION: ICD-10-CM

## 2019-08-15 DIAGNOSIS — Z90.49 ACQUIRED ABSENCE OF OTHER SPECIFIED PARTS OF DIGESTIVE TRACT: Chronic | ICD-10-CM

## 2019-08-15 PROCEDURE — 77049 MRI BREAST C-+ W/CAD BI: CPT | Mod: 26

## 2019-08-15 PROCEDURE — C8937: CPT

## 2019-08-15 PROCEDURE — C8908: CPT

## 2019-08-20 ENCOUNTER — APPOINTMENT (OUTPATIENT)
Dept: ULTRASOUND IMAGING | Facility: CLINIC | Age: 54
End: 2019-08-20
Payer: COMMERCIAL

## 2019-08-20 ENCOUNTER — OUTPATIENT (OUTPATIENT)
Dept: OUTPATIENT SERVICES | Facility: HOSPITAL | Age: 54
LOS: 1 days | End: 2019-08-20
Payer: COMMERCIAL

## 2019-08-20 DIAGNOSIS — Z90.49 ACQUIRED ABSENCE OF OTHER SPECIFIED PARTS OF DIGESTIVE TRACT: Chronic | ICD-10-CM

## 2019-08-20 DIAGNOSIS — Z00.8 ENCOUNTER FOR OTHER GENERAL EXAMINATION: ICD-10-CM

## 2019-08-20 PROCEDURE — 76856 US EXAM PELVIC COMPLETE: CPT

## 2019-08-20 PROCEDURE — 76856 US EXAM PELVIC COMPLETE: CPT | Mod: 26

## 2019-08-20 PROCEDURE — 76830 TRANSVAGINAL US NON-OB: CPT | Mod: 26

## 2019-08-20 PROCEDURE — 76830 TRANSVAGINAL US NON-OB: CPT

## 2019-09-11 ENCOUNTER — APPOINTMENT (OUTPATIENT)
Dept: GASTROENTEROLOGY | Facility: CLINIC | Age: 54
End: 2019-09-11

## 2019-09-28 ENCOUNTER — EMERGENCY (EMERGENCY)
Facility: HOSPITAL | Age: 54
LOS: 1 days | Discharge: ANOTHER HOSPITAL | End: 2019-09-28
Attending: INTERNAL MEDICINE | Admitting: EMERGENCY MEDICINE
Payer: COMMERCIAL

## 2019-09-28 VITALS
DIASTOLIC BLOOD PRESSURE: 95 MMHG | RESPIRATION RATE: 16 BRPM | SYSTOLIC BLOOD PRESSURE: 153 MMHG | HEART RATE: 58 BPM | TEMPERATURE: 97 F | OXYGEN SATURATION: 100 % | WEIGHT: 111.99 LBS | HEIGHT: 64 IN

## 2019-09-28 DIAGNOSIS — Z90.49 ACQUIRED ABSENCE OF OTHER SPECIFIED PARTS OF DIGESTIVE TRACT: Chronic | ICD-10-CM

## 2019-09-28 LAB
ALBUMIN SERPL ELPH-MCNC: 3.8 G/DL — SIGNIFICANT CHANGE UP (ref 3.3–5)
ALP SERPL-CCNC: 70 U/L — SIGNIFICANT CHANGE UP (ref 40–120)
ALT FLD-CCNC: 23 U/L — SIGNIFICANT CHANGE UP (ref 12–78)
ANION GAP SERPL CALC-SCNC: 23 MMOL/L — HIGH (ref 5–17)
AST SERPL-CCNC: 20 U/L — SIGNIFICANT CHANGE UP (ref 15–37)
BILIRUB SERPL-MCNC: 0.2 MG/DL — SIGNIFICANT CHANGE UP (ref 0.2–1.2)
BUN SERPL-MCNC: 15 MG/DL — SIGNIFICANT CHANGE UP (ref 7–23)
CHLORIDE SERPL-SCNC: 106 MMOL/L — SIGNIFICANT CHANGE UP (ref 96–108)
CO2 SERPL-SCNC: 12 MMOL/L — LOW (ref 22–31)
CREAT SERPL-MCNC: 0.81 MG/DL — SIGNIFICANT CHANGE UP (ref 0.5–1.3)
D DIMER BLD IA.RAPID-MCNC: 239 NG/ML DDU — HIGH
HCT VFR BLD CALC: 38.6 % — SIGNIFICANT CHANGE UP (ref 34.5–45)
HGB BLD-MCNC: 13 G/DL — SIGNIFICANT CHANGE UP (ref 11.5–15.5)
INR BLD: 0.95 RATIO — SIGNIFICANT CHANGE UP (ref 0.88–1.16)
MCHC RBC-ENTMCNC: 30.7 PG — SIGNIFICANT CHANGE UP (ref 27–34)
MCHC RBC-ENTMCNC: 33.7 GM/DL — SIGNIFICANT CHANGE UP (ref 32–36)
MCV RBC AUTO: 91 FL — SIGNIFICANT CHANGE UP (ref 80–100)
NRBC # BLD: 0 /100 WBCS — SIGNIFICANT CHANGE UP (ref 0–0)
PLATELET # BLD AUTO: 237 K/UL — SIGNIFICANT CHANGE UP (ref 150–400)
POTASSIUM SERPL-MCNC: 3.2 MMOL/L — LOW (ref 3.5–5.3)
POTASSIUM SERPL-SCNC: 3.2 MMOL/L — LOW (ref 3.5–5.3)
PROT SERPL-MCNC: 6.9 G/DL — SIGNIFICANT CHANGE UP (ref 6–8.3)
PROTHROM AB SERPL-ACNC: 10.8 SEC — SIGNIFICANT CHANGE UP (ref 10–12.9)
RBC # BLD: 4.24 M/UL — SIGNIFICANT CHANGE UP (ref 3.8–5.2)
RBC # FLD: 12.5 % — SIGNIFICANT CHANGE UP (ref 10.3–14.5)
SODIUM SERPL-SCNC: 141 MMOL/L — SIGNIFICANT CHANGE UP (ref 135–145)
TROPONIN I SERPL-MCNC: <.015 NG/ML — SIGNIFICANT CHANGE UP (ref 0.01–0.04)
WBC # BLD: 9.38 K/UL — SIGNIFICANT CHANGE UP (ref 3.8–10.5)
WBC # FLD AUTO: 9.38 K/UL — SIGNIFICANT CHANGE UP (ref 3.8–10.5)

## 2019-09-28 PROCEDURE — 99285 EMERGENCY DEPT VISIT HI MDM: CPT

## 2019-09-28 PROCEDURE — 93010 ELECTROCARDIOGRAM REPORT: CPT

## 2019-09-28 PROCEDURE — 71045 X-RAY EXAM CHEST 1 VIEW: CPT | Mod: 26

## 2019-09-28 RX ORDER — ASPIRIN/CALCIUM CARB/MAGNESIUM 324 MG
0 TABLET ORAL
Qty: 0 | Refills: 0 | DISCHARGE

## 2019-09-28 RX ORDER — MORPHINE SULFATE 50 MG/1
2 CAPSULE, EXTENDED RELEASE ORAL ONCE
Refills: 0 | Status: DISCONTINUED | OUTPATIENT
Start: 2019-09-28 | End: 2019-09-28

## 2019-09-28 RX ORDER — DIPHENHYDRAMINE HCL 50 MG
25 CAPSULE ORAL ONCE
Refills: 0 | Status: COMPLETED | OUTPATIENT
Start: 2019-09-28 | End: 2019-09-28

## 2019-09-28 RX ORDER — HYDROCORTISONE 20 MG
100 TABLET ORAL ONCE
Refills: 0 | Status: COMPLETED | OUTPATIENT
Start: 2019-09-28 | End: 2019-09-28

## 2019-09-28 RX ADMIN — MORPHINE SULFATE 2 MILLIGRAM(S): 50 CAPSULE, EXTENDED RELEASE ORAL at 23:43

## 2019-09-28 RX ADMIN — Medication 100 MILLIGRAM(S): at 23:57

## 2019-09-28 RX ADMIN — Medication 25 MILLIGRAM(S): at 23:57

## 2019-09-28 RX ADMIN — MORPHINE SULFATE 2 MILLIGRAM(S): 50 CAPSULE, EXTENDED RELEASE ORAL at 23:58

## 2019-09-28 NOTE — ED PROVIDER NOTE - NSCAREINITIATED _GEN_ER
Dx: L hip bursitis          Authorized # of Visits:  [id number]         Next MD visit: none scheduled  Fall Risk: standard         Precautions: n/a             Subjective: My hip seems a little bit better. Sore where you massaged it the other day.   Think that it d yellow  3 x 10    **No lateral hip discomfort Eccentric step downs tapping heel down to floor 4\" step  3 x 10 PEE    **Cues to limit L hip drop with L step down x x      STM to TFL and lateral hip Shuttle   I Blue 1 yellow  3 x 15 x x      PROM L hip STM Ramon Limon(Attending)

## 2019-09-28 NOTE — ED PROVIDER NOTE - SIGNIFICANT NEGATIVE FINDINGS
no fever, no chills,  no syncope , no SOB, no diaphoresis, no radiation, no palpitations, no n/v, no neuro changes.

## 2019-09-28 NOTE — ED PROVIDER NOTE - PROGRESS NOTE DETAILS
Dr. Alex now in ED will see patient discussed case with Dr. Greg Doty cards fellow, recommend heparin and plavix, accepted by Dr. Fitzpatrick.  Spoke with patient and patient sister Bianka seaman Luisito will go directly to ccu discussed case with Dr. Greg Doty cards fellow, recommend heparin and plavix, accepted by Dr. Alonso  Spoke with patient and patient sister Bianka seaman Kennethpetey will go directly to ccu

## 2019-09-28 NOTE — ED PROVIDER NOTE - OBJECTIVE STATEMENT
55 y/o WF h/o Anticardiolipin Antibody Positive  CAD Myocardial Infarct   C/C  chest pain - radiating down both arms- also has a headache  took  and NTG spray pain 10/10 to 6/10 53 y/o WF h/o Anticardiolipin Antibody Positive  CAD Myocardial Infarct   C/C  chest pain - radiating down both arms- also has a headache, BP was elevated   took  and NTG spray pain 10/10 to 6/10. H/O arteriole dysplasia and h/o  cerebral and coronary aneurysms, strong family history of same with h/o thoracic aneurysms 53 y/o WF h/o Anticardiolipin Antibody Positive  CAD Myocardial Infarct   C/C  chest pain - radiating down both arms- also has a headache, BP was elevated   took  and NTG spray pain 10/10 to 6/10. H/O arteriole dysplasia and h/o  cerebral and coronary aneurysms, strong family history of same with h/o thoracic aneurysms  0:00 /76, CP resolved

## 2019-09-28 NOTE — ED PROVIDER NOTE - CLINICAL SUMMARY MEDICAL DECISION MAKING FREE TEXT BOX
acute HA and chest pain h/o CAD, vascular disease  r/o MI, R/O aneurysm  CT Head neck chest and abdomen , Cardiology consulted

## 2019-09-28 NOTE — ED ADULT NURSE NOTE - OBJECTIVE STATEMENT
Pt. complaining of chest pain starting tonight radiating down bilateral arms with dizziness, pain to posterior right side of neck and headache. Pt. reported history of heart attack and stroke. Pt. reported taking Aspirin 324mg prior to ED arrival.

## 2019-09-28 NOTE — ED PROVIDER NOTE - CROS ED ENMT ALL NEG
Patient arrives from dementia unit after falling out of bed.  She states she was trying to get up to go to the bathroom and, \"I just fell out of bed onto my right hip\".  She c/o right hip pain, denies hitting head or LOC.    negative...

## 2019-09-28 NOTE — ED PROVIDER NOTE - CONSTITUTIONAL, MLM
normal... Well appearing, well nourished, awake, alert, oriented to person, place, time/situation, very anxious

## 2019-09-28 NOTE — ED PROVIDER NOTE - CARE PLAN
Principal Discharge DX:	Chest pain  Secondary Diagnosis:	Headache Principal Discharge DX:	NSTEMI (non-ST elevated myocardial infarction)  Secondary Diagnosis:	Headache

## 2019-09-29 ENCOUNTER — INPATIENT (INPATIENT)
Facility: HOSPITAL | Age: 54
LOS: 0 days | Discharge: HOME | DRG: 287 | End: 2019-09-30
Attending: STUDENT IN AN ORGANIZED HEALTH CARE EDUCATION/TRAINING PROGRAM | Admitting: STUDENT IN AN ORGANIZED HEALTH CARE EDUCATION/TRAINING PROGRAM
Payer: COMMERCIAL

## 2019-09-29 VITALS
RESPIRATION RATE: 15 BRPM | HEART RATE: 62 BPM | OXYGEN SATURATION: 98 % | DIASTOLIC BLOOD PRESSURE: 60 MMHG | TEMPERATURE: 98 F | SYSTOLIC BLOOD PRESSURE: 113 MMHG

## 2019-09-29 VITALS
WEIGHT: 119.93 LBS | HEART RATE: 58 BPM | DIASTOLIC BLOOD PRESSURE: 56 MMHG | RESPIRATION RATE: 14 BRPM | SYSTOLIC BLOOD PRESSURE: 106 MMHG | TEMPERATURE: 98 F | HEIGHT: 64 IN | OXYGEN SATURATION: 99 %

## 2019-09-29 DIAGNOSIS — I21.4 NON-ST ELEVATION (NSTEMI) MYOCARDIAL INFARCTION: ICD-10-CM

## 2019-09-29 DIAGNOSIS — Z90.49 ACQUIRED ABSENCE OF OTHER SPECIFIED PARTS OF DIGESTIVE TRACT: Chronic | ICD-10-CM

## 2019-09-29 LAB
ALBUMIN SERPL ELPH-MCNC: 4.4 G/DL — SIGNIFICANT CHANGE UP (ref 3.3–5)
ALP SERPL-CCNC: 70 U/L — SIGNIFICANT CHANGE UP (ref 40–120)
ALT FLD-CCNC: 14 U/L — SIGNIFICANT CHANGE UP (ref 10–45)
ANION GAP SERPL CALC-SCNC: 15 MMOL/L — SIGNIFICANT CHANGE UP (ref 5–17)
APTT BLD: 106.2 SEC — HIGH (ref 27.5–36.3)
APTT BLD: 29.1 SEC — SIGNIFICANT CHANGE UP (ref 28.5–37)
AST SERPL-CCNC: 26 U/L — SIGNIFICANT CHANGE UP (ref 10–40)
BILIRUB SERPL-MCNC: 0.3 MG/DL — SIGNIFICANT CHANGE UP (ref 0.2–1.2)
BLD GP AB SCN SERPL QL: NEGATIVE — SIGNIFICANT CHANGE UP
BUN SERPL-MCNC: 13 MG/DL — SIGNIFICANT CHANGE UP (ref 7–23)
CALCIUM SERPL-MCNC: 9.2 MG/DL — SIGNIFICANT CHANGE UP (ref 8.5–10.1)
CALCIUM SERPL-MCNC: 9.9 MG/DL — SIGNIFICANT CHANGE UP (ref 8.4–10.5)
CHLORIDE SERPL-SCNC: 102 MMOL/L — SIGNIFICANT CHANGE UP (ref 96–108)
CHOLEST SERPL-MCNC: 178 MG/DL — SIGNIFICANT CHANGE UP (ref 10–199)
CK MB BLD-MCNC: 5.1 % — HIGH (ref 0–3.5)
CK MB BLD-MCNC: 6.5 % — HIGH (ref 0–3.5)
CK MB CFR SERPL CALC: 5.1 NG/ML — HIGH (ref 0–3.8)
CK MB CFR SERPL CALC: 9.6 NG/ML — HIGH (ref 0–3.8)
CK SERPL-CCNC: 100 U/L — SIGNIFICANT CHANGE UP (ref 25–170)
CK SERPL-CCNC: 148 U/L — SIGNIFICANT CHANGE UP (ref 25–170)
CO2 SERPL-SCNC: 22 MMOL/L — SIGNIFICANT CHANGE UP (ref 22–31)
CREAT SERPL-MCNC: 0.68 MG/DL — SIGNIFICANT CHANGE UP (ref 0.5–1.3)
GLUCOSE SERPL-MCNC: 100 MG/DL — HIGH (ref 70–99)
GLUCOSE SERPL-MCNC: 71 MG/DL — SIGNIFICANT CHANGE UP (ref 70–99)
HBA1C BLD-MCNC: 4.9 % — SIGNIFICANT CHANGE UP (ref 4–5.6)
HCT VFR BLD CALC: 40.6 % — SIGNIFICANT CHANGE UP (ref 34.5–45)
HDLC SERPL-MCNC: 88 MG/DL — SIGNIFICANT CHANGE UP
HGB BLD-MCNC: 13.2 G/DL — SIGNIFICANT CHANGE UP (ref 11.5–15.5)
INR BLD: 0.96 RATIO — SIGNIFICANT CHANGE UP (ref 0.88–1.16)
LIPID PNL WITH DIRECT LDL SERPL: 83 MG/DL — SIGNIFICANT CHANGE UP
MCHC RBC-ENTMCNC: 30.4 PG — SIGNIFICANT CHANGE UP (ref 27–34)
MCHC RBC-ENTMCNC: 32.5 GM/DL — SIGNIFICANT CHANGE UP (ref 32–36)
MCV RBC AUTO: 93.5 FL — SIGNIFICANT CHANGE UP (ref 80–100)
PLATELET # BLD AUTO: 245 K/UL — SIGNIFICANT CHANGE UP (ref 150–400)
POTASSIUM SERPL-MCNC: 3.7 MMOL/L — SIGNIFICANT CHANGE UP (ref 3.5–5.3)
POTASSIUM SERPL-SCNC: 3.7 MMOL/L — SIGNIFICANT CHANGE UP (ref 3.5–5.3)
PROT SERPL-MCNC: 7.1 G/DL — SIGNIFICANT CHANGE UP (ref 6–8.3)
PROTHROM AB SERPL-ACNC: 10.9 SEC — SIGNIFICANT CHANGE UP (ref 10–12.9)
RBC # BLD: 4.34 M/UL — SIGNIFICANT CHANGE UP (ref 3.8–5.2)
RBC # FLD: 11.5 % — SIGNIFICANT CHANGE UP (ref 10.3–14.5)
RH IG SCN BLD-IMP: POSITIVE — SIGNIFICANT CHANGE UP
SODIUM SERPL-SCNC: 139 MMOL/L — SIGNIFICANT CHANGE UP (ref 135–145)
T3 SERPL-MCNC: 62 NG/DL — LOW (ref 80–200)
T4 AB SER-ACNC: 4.4 UG/DL — LOW (ref 4.6–12)
TOTAL CHOLESTEROL/HDL RATIO MEASUREMENT: 2 RATIO — LOW (ref 3.3–7.1)
TRIGL SERPL-MCNC: 34 MG/DL — SIGNIFICANT CHANGE UP (ref 10–149)
TROPONIN I SERPL-MCNC: 2.39 NG/ML — HIGH (ref 0.01–0.04)
TROPONIN T, HIGH SENSITIVITY RESULT: 115 NG/L — HIGH (ref 0–51)
TROPONIN T, HIGH SENSITIVITY RESULT: 188 NG/L — HIGH (ref 0–51)
TSH SERPL-MCNC: 0.85 UIU/ML — SIGNIFICANT CHANGE UP (ref 0.27–4.2)
WBC # BLD: 8 K/UL — SIGNIFICANT CHANGE UP (ref 3.8–10.5)
WBC # FLD AUTO: 8 K/UL — SIGNIFICANT CHANGE UP (ref 3.8–10.5)

## 2019-09-29 PROCEDURE — 71275 CT ANGIOGRAPHY CHEST: CPT

## 2019-09-29 PROCEDURE — 70498 CT ANGIOGRAPHY NECK: CPT

## 2019-09-29 PROCEDURE — 70498 CT ANGIOGRAPHY NECK: CPT | Mod: 26

## 2019-09-29 PROCEDURE — 74175 CTA ABDOMEN W/CONTRAST: CPT | Mod: 26

## 2019-09-29 PROCEDURE — 85610 PROTHROMBIN TIME: CPT

## 2019-09-29 PROCEDURE — 80053 COMPREHEN METABOLIC PANEL: CPT

## 2019-09-29 PROCEDURE — 93005 ELECTROCARDIOGRAM TRACING: CPT

## 2019-09-29 PROCEDURE — 96375 TX/PRO/DX INJ NEW DRUG ADDON: CPT

## 2019-09-29 PROCEDURE — 93010 ELECTROCARDIOGRAM REPORT: CPT

## 2019-09-29 PROCEDURE — 71045 X-RAY EXAM CHEST 1 VIEW: CPT

## 2019-09-29 PROCEDURE — 85379 FIBRIN DEGRADATION QUANT: CPT

## 2019-09-29 PROCEDURE — 71275 CT ANGIOGRAPHY CHEST: CPT | Mod: 26

## 2019-09-29 PROCEDURE — 36415 COLL VENOUS BLD VENIPUNCTURE: CPT

## 2019-09-29 PROCEDURE — 93458 L HRT ARTERY/VENTRICLE ANGIO: CPT | Mod: 26,GC

## 2019-09-29 PROCEDURE — 99285 EMERGENCY DEPT VISIT HI MDM: CPT | Mod: 25

## 2019-09-29 PROCEDURE — 85027 COMPLETE CBC AUTOMATED: CPT

## 2019-09-29 PROCEDURE — 99291 CRITICAL CARE FIRST HOUR: CPT | Mod: 25

## 2019-09-29 PROCEDURE — 85730 THROMBOPLASTIN TIME PARTIAL: CPT

## 2019-09-29 PROCEDURE — 96374 THER/PROPH/DIAG INJ IV PUSH: CPT | Mod: XU

## 2019-09-29 PROCEDURE — 70496 CT ANGIOGRAPHY HEAD: CPT | Mod: 26

## 2019-09-29 PROCEDURE — 99223 1ST HOSP IP/OBS HIGH 75: CPT | Mod: 25

## 2019-09-29 PROCEDURE — 74175 CTA ABDOMEN W/CONTRAST: CPT

## 2019-09-29 PROCEDURE — 70496 CT ANGIOGRAPHY HEAD: CPT

## 2019-09-29 PROCEDURE — 84484 ASSAY OF TROPONIN QUANT: CPT

## 2019-09-29 RX ORDER — DIPHENHYDRAMINE HCL 50 MG
25 CAPSULE ORAL EVERY 6 HOURS
Refills: 0 | Status: DISCONTINUED | OUTPATIENT
Start: 2019-09-29 | End: 2019-09-30

## 2019-09-29 RX ORDER — HEPARIN SODIUM 5000 [USP'U]/ML
3200 INJECTION INTRAVENOUS; SUBCUTANEOUS EVERY 6 HOURS
Refills: 0 | Status: DISCONTINUED | OUTPATIENT
Start: 2019-09-29 | End: 2019-10-04

## 2019-09-29 RX ORDER — HEPARIN SODIUM 5000 [USP'U]/ML
3200 INJECTION INTRAVENOUS; SUBCUTANEOUS ONCE
Refills: 0 | Status: COMPLETED | OUTPATIENT
Start: 2019-09-29 | End: 2019-09-29

## 2019-09-29 RX ORDER — CHLORHEXIDINE GLUCONATE 213 G/1000ML
1 SOLUTION TOPICAL
Refills: 0 | Status: DISCONTINUED | OUTPATIENT
Start: 2019-09-29 | End: 2019-09-30

## 2019-09-29 RX ORDER — LUBIPROSTONE 24 UG/1
1 CAPSULE, GELATIN COATED ORAL
Qty: 0 | Refills: 0 | DISCHARGE

## 2019-09-29 RX ORDER — ALPRAZOLAM 0.25 MG
0.5 TABLET ORAL DAILY
Refills: 0 | Status: DISCONTINUED | OUTPATIENT
Start: 2019-09-30 | End: 2019-09-30

## 2019-09-29 RX ORDER — HEPARIN SODIUM 5000 [USP'U]/ML
INJECTION INTRAVENOUS; SUBCUTANEOUS
Qty: 25000 | Refills: 0 | Status: DISCONTINUED | OUTPATIENT
Start: 2019-09-29 | End: 2019-10-04

## 2019-09-29 RX ORDER — SERTRALINE 25 MG/1
50 TABLET, FILM COATED ORAL DAILY
Refills: 0 | Status: DISCONTINUED | OUTPATIENT
Start: 2019-09-29 | End: 2019-09-30

## 2019-09-29 RX ORDER — CLOPIDOGREL BISULFATE 75 MG/1
300 TABLET, FILM COATED ORAL ONCE
Refills: 0 | Status: COMPLETED | OUTPATIENT
Start: 2019-09-29 | End: 2019-09-29

## 2019-09-29 RX ORDER — LEVOTHYROXINE SODIUM 125 MCG
88 TABLET ORAL DAILY
Refills: 0 | Status: DISCONTINUED | OUTPATIENT
Start: 2019-09-29 | End: 2019-09-30

## 2019-09-29 RX ORDER — DULOXETINE HYDROCHLORIDE 30 MG/1
30 CAPSULE, DELAYED RELEASE ORAL DAILY
Refills: 0 | Status: DISCONTINUED | OUTPATIENT
Start: 2019-09-29 | End: 2019-09-30

## 2019-09-29 RX ORDER — CLONAZEPAM 1 MG
1 TABLET ORAL
Qty: 0 | Refills: 0 | DISCHARGE

## 2019-09-29 RX ORDER — ZOLPIDEM TARTRATE 10 MG/1
5 TABLET ORAL AT BEDTIME
Refills: 0 | Status: DISCONTINUED | OUTPATIENT
Start: 2019-09-29 | End: 2019-09-29

## 2019-09-29 RX ORDER — ASPIRIN/CALCIUM CARB/MAGNESIUM 324 MG
325 TABLET ORAL DAILY
Refills: 0 | Status: DISCONTINUED | OUTPATIENT
Start: 2019-09-29 | End: 2019-09-30

## 2019-09-29 RX ORDER — ENOXAPARIN SODIUM 100 MG/ML
40 INJECTION SUBCUTANEOUS DAILY
Refills: 0 | Status: DISCONTINUED | OUTPATIENT
Start: 2019-09-30 | End: 2019-09-30

## 2019-09-29 RX ORDER — CLONAZEPAM 1 MG
0.5 TABLET ORAL AT BEDTIME
Refills: 0 | Status: DISCONTINUED | OUTPATIENT
Start: 2019-09-29 | End: 2019-09-30

## 2019-09-29 RX ORDER — POTASSIUM CHLORIDE 20 MEQ
20 PACKET (EA) ORAL ONCE
Refills: 0 | Status: COMPLETED | OUTPATIENT
Start: 2019-09-29 | End: 2019-09-29

## 2019-09-29 RX ORDER — ESZOPICLONE 2 MG/1
3 TABLET, COATED ORAL AT BEDTIME
Refills: 0 | Status: DISCONTINUED | OUTPATIENT
Start: 2019-09-29 | End: 2019-09-30

## 2019-09-29 RX ADMIN — HEPARIN SODIUM 650 UNIT(S)/HR: 5000 INJECTION INTRAVENOUS; SUBCUTANEOUS at 09:22

## 2019-09-29 RX ADMIN — Medication 25 MILLIGRAM(S): at 22:20

## 2019-09-29 RX ADMIN — CLOPIDOGREL BISULFATE 300 MILLIGRAM(S): 75 TABLET, FILM COATED ORAL at 09:03

## 2019-09-29 RX ADMIN — HEPARIN SODIUM 3200 UNIT(S): 5000 INJECTION INTRAVENOUS; SUBCUTANEOUS at 09:20

## 2019-09-29 RX ADMIN — ESZOPICLONE 3 MILLIGRAM(S): 2 TABLET, COATED ORAL at 21:09

## 2019-09-29 RX ADMIN — Medication 20 MILLIEQUIVALENT(S): at 01:41

## 2019-09-29 RX ADMIN — Medication 325 MILLIGRAM(S): at 17:44

## 2019-09-29 RX ADMIN — Medication 0.5 MILLIGRAM(S): at 21:03

## 2019-09-29 NOTE — ED ADULT NURSE REASSESSMENT NOTE - COMFORT CARE
side rails up/darkened lights
plan of care explained/side rails up
side rails up/darkened lights/plan of care explained
side rails up/darkened lights/wait time explained/plan of care explained

## 2019-09-29 NOTE — ED ADULT NURSE REASSESSMENT NOTE - NS ED NURSE REASSESS COMMENT FT1
patient OOB off monitor to go to bathroom in spite of being told to remain on stretcher with call button within reach pts  at bedside with takeout food/drink pt was told no food or drink prior  states pt did have some soda pt again reminded no food or drink and to call for assistance call bell within reach
report from previous shift patient awake alert oriented dispo pending

## 2019-09-29 NOTE — H&P ADULT - ASSESSMENT
55 yo F with hx MI (2005, no PCI), R renal artery fibromuscular dysplasia, h/o bilateral carotid artery dissection (2011), hx antiphospholipid antibody positive and Hashimoto's thyroiditis who presented to CCU as transfer from [facility] ED for further management/evaluation of NSTEMI, now s/p [facility] with no coronary occlusions or dissections.    PLAN:  #CV    #Pulm    #Neuro  #Renal  #Heme  #Endo  #GI  #ID 55 yo F with hx MI (2005, no PCI), R renal artery fibromuscular dysplasia, h/o bilateral carotid artery dissection (2011), hx antiphospholipid antibody positive and Hashimoto's thyroiditis who presented to CCU as transfer from [facility] ED for further management/evaluation of NSTEMI, now s/p [facility] with no coronary occlusions or dissections.    PLAN:  #CV  - S/p radiating CP with elevated troponins --> [facility] 9/29/19 with no coronary occlusions or dissections.  - MEDS:  mg qd, assess BP and start home verapamil if hypertensive. S/p heparin - d/c'ed.   Start statin after lipid panel results.  - STUDIES: TTE  #Pulm: no active issues  #Neuro: no active issues.  #Renal: Hx R renal artery fibromuscular dysplasia. Cr normal, trend daily BMP.  #Heme: no active issues.  - DVT ppx Enoxaparin starting 9/30.  #Endo: Hx Hashimoto's thyroiditis. C/w levothyroxine home dose.  #GI: DASH-TLC diet.  #ID: No c/f infection. 53 yo F with hx MI (2005, no PCI), R renal artery fibromuscular dysplasia, h/o bilateral carotid artery dissection (2011), hx antiphospholipid antibody positive and Hashimoto's thyroiditis who presented to CCU as transfer from [facility] ED for further management/evaluation of NSTEMI, now s/p [facility] with no coronary occlusions or dissections.    PLAN:  #CV  - S/p radiating CP with elevated troponins --> [facility] 9/29/19 with no coronary occlusions or dissections.  - MEDS:  mg qd, holding off Plavix given normal LHC. assess BP and start verapamil 80 mg q8h if hypertensive (uptitrate to home dose if still hypertensive).   S/p heparin - d/c'ed.   Start statin after lipid panel results.  - STUDIES: TTE  #Pulm: no active issues  #Neuro: no active issues.  #Renal: Hx R renal artery fibromuscular dysplasia. Cr normal, trend daily BMP.  #Heme: no active issues.  - DVT ppx Enoxaparin starting 9/30.  #Endo: Hx Hashimoto's thyroiditis. C/w levothyroxine home dose.  #GI: DASH-TLC diet.  #ID: No c/f infection. 53 yo F with hx MI (2005, no PCI), R renal artery fibromuscular dysplasia, h/o bilateral carotid artery dissection (2011), hx antiphospholipid antibody positive and Hashimoto's thyroiditis who presented to CCU as transfer from [facility] ED for further management/evaluation of NSTEMI, now s/p [facility] with no coronary occlusions or dissections.    PLAN:  #CV  - S/p radiating CP with elevated troponins --> [facility] 9/29/19 with no coronary occlusions or dissections.  - MEDS:  mg qd, holding off Plavix given normal LHC. assess BP and start verapamil 80 mg q8h if hypertensive (uptitrate to home dose if still hypertensive).   S/p heparin - d/c'ed.   Start statin after lipid panel results.  - STUDIES: TTE    #Pulm: no active issues  #Neuro: no active issues. Hx insomnia - on Klonopin and Lunesta at home nightly, c/w clonazepam and zolpidem (as Lunesta substitute).  #Psych: h/o anxiety on duloxetine and sertraline, continued.  #Renal: Hx R renal artery fibromuscular dysplasia. Cr normal, trend daily BMP.  #Heme: no active issues.  - DVT ppx Enoxaparin starting 9/30.  #Endo: Hx Hashimoto's thyroiditis. C/w levothyroxine home dose.  #GI: DASH-TLC diet. ?Hx of IBS-C, on Linzess at home, monitor for any symptoms.  #ID: No c/f infection.

## 2019-09-29 NOTE — H&P ADULT - NSHPREVIEWOFSYSTEMS_GEN_ALL_CORE
CONSTITUTIONAL: No weakness, fevers or chills  EYES/ENT: No visual changes;  No vertigo or throat pain   NECK: No pain or stiffness  RESPIRATORY: No cough, wheezing, hemoptysis; No shortness of breath  CARDIOVASCULAR: No chest pain or palpitations  GASTROINTESTINAL: No abdominal or epigastric pain. No nausea, vomiting, or hematemesis; No diarrhea or constipation. No melena or hematochezia.  GENITOURINARY: No dysuria, frequency or hematuria  NEUROLOGICAL: No numbness or weakness  SKIN: No itching, burning, rashes, or lesions CONSTITUTIONAL: + fatigue, no fevers or chills  EYES/ENT: No visual changes;  No vertigo.  NECK: No pain or stiffness.  RESPIRATORY: No cough, wheezing, hemoptysis; No shortness of breath  CARDIOVASCULAR: No chest pain or palpitations  GASTROINTESTINAL: No abdominal or epigastric pain. No nausea, vomiting, or hematemesis; No diarrhea or constipation. No melena or hematochezia.  GENITOURINARY: No dysuria, frequency or hematuria  NEUROLOGICAL: No numbness or weakness  SKIN: No itching, burning, rashes, or lesions    Med: Pt reported menorrhagia when she was taking Plavix, but postmenopausal for 8 years. No known allergic reaction to Plavix. CONSTITUTIONAL: + fatigue, no fevers or chills  EYES/ENT: No visual changes;  No vertigo.  NECK: No pain or stiffness.  RESPIRATORY: No cough, wheezing, hemoptysis; No shortness of breath  CARDIOVASCULAR: No chest pain or palpitations  GASTROINTESTINAL: Chronic intermittent RLQ pain for months, no other abdominal pain. No nausea, vomiting, or hematemesis; No diarrhea or constipation. No melena or hematochezia.  GENITOURINARY: No dysuria, frequency or hematuria  NEUROLOGICAL: No numbness or weakness  SKIN: No itching, burning, rashes, or lesions    Med: Pt reported menorrhagia when she was taking Plavix, but postmenopausal for 8 years. No known allergic reaction to Plavix.

## 2019-09-29 NOTE — CONSULT NOTE ADULT - ASSESSMENT
The etiology of the patient's chest discomfort is unclear but she had a bump in troponin from an initial normal reading suggestive of a non-ST segment myocardial infarction. She has no evidence for aortic dissection or pulmonary process. We had an extensive discussion with the patient and her  regarding further evaluation. Given her clinical picture, it is reasonable to transfer her to [facility] for further evaluation and possible angiography.    Recommend    Urgent transfer to [facility] arranged    Intravenous heparin    Dual antiplatelet therapy    Continue other medications for now    Further management will be dependent on her clinical course    35 minutes of critical care time spent with patient and making arrangements

## 2019-09-29 NOTE — H&P ADULT - NSHPPHYSICALEXAM_GEN_ALL_CORE
ICU Vital Signs Last 24 Hrs  T(C): 36.6 (29 Sep 2019 10:30), Max: 36.9 (29 Sep 2019 07:30)  T(F): 97.9 (29 Sep 2019 10:30), Max: 98.4 (29 Sep 2019 07:30)  HR: 54 (29 Sep 2019 12:30) (54 - 72)  BP: 122/58 (29 Sep 2019 12:30) (106/56 - 158/86)  BP(mean): 85 (29 Sep 2019 12:30) (75 - 93)  ABP: --  ABP(mean): --  RR: 24 (29 Sep 2019 12:30) (13 - 24)  SpO2: 98% (29 Sep 2019 12:30) (97% - 100%)    PHYSICAL EXAM:  GENERAL: NAD, well-groomed, well-developed  EYES: EOMI, PERRLA, conjunctiva and sclera clear  ENMT: No tonsillar erythema, exudates, or enlargement; Moist mucous membranes  NECK: Supple, No JVD, Normal thyroid  CHEST/LUNG: Clear to auscultation bilaterally; No crackles, rhonchi, wheezing, or rubs  HEART: Regular rate and rhythm; No murmurs, rubs, or gallops  ABDOMEN: Soft, Nontender, Nondistended; Bowel sounds present  EXTREMITIES:  2+ Peripheral Pulses, No clubbing, cyanosis, or edema  LYMPH: No lymphadenopathy noted  SKIN: No rashes or lesions  NERVOUS SYSTEM:  Alert & Oriented X3, Good concentration; Motor Strength 5/5 B/L upper and lower extremities; DTRs 2+ intact and symmetric ICU Vital Signs Last 24 Hrs  T(C): 36.6 (29 Sep 2019 10:30), Max: 36.9 (29 Sep 2019 07:30)  T(F): 97.9 (29 Sep 2019 10:30), Max: 98.4 (29 Sep 2019 07:30)  HR: 54 (29 Sep 2019 12:30) (54 - 72)  BP: 122/58 (29 Sep 2019 12:30) (106/56 - 158/86)  BP(mean): 85 (29 Sep 2019 12:30) (75 - 93)  ABP: --  ABP(mean): --  RR: 24 (29 Sep 2019 12:30) (13 - 24)  SpO2: 98% (29 Sep 2019 12:30) (97% - 100%)    PHYSICAL EXAM: pending  GENERAL: NAD, well-groomed, well-developed  EYES: EOMI, PERRLA, conjunctiva and sclera clear  ENMT: No tonsillar erythema, exudates, or enlargement; Moist mucous membranes  CHEST/LUNG: Clear to auscultation bilaterally; No crackles, rhonchi, wheezing, or rubs  HEART: Regular rate and rhythm; No murmurs, rubs, or gallops  ABDOMEN: Soft, Nontender, Nondistended; Bowel sounds present  EXTREMITIES:  2+ Peripheral Pulses, No clubbing, cyanosis, or edema  LYMPH: No lymphadenopathy noted  SKIN: No rashes or lesions  NERVOUS SYSTEM:  Alert & Oriented, Good concentration ICU Vital Signs Last 24 Hrs  T(C): 36.6 (29 Sep 2019 10:30), Max: 36.9 (29 Sep 2019 07:30)  T(F): 97.9 (29 Sep 2019 10:30), Max: 98.4 (29 Sep 2019 07:30)  HR: 54 (29 Sep 2019 12:30) (54 - 72)  BP: 122/58 (29 Sep 2019 12:30) (106/56 - 158/86)  BP(mean): 85 (29 Sep 2019 12:30) (75 - 93)  ABP: --  ABP(mean): --  RR: 24 (29 Sep 2019 12:30) (13 - 24)  SpO2: 98% (29 Sep 2019 12:30) (97% - 100%)    PHYSICAL EXAM: pending  GENERAL: NAD, well-groomed, well-developed, appears tired  EYES: PERRLA, conjunctiva and sclera clear  ENMT: No tonsillar erythema, exudates, or enlargement; Moist mucous membranes  CHEST/LUNG: Clear to auscultation bilaterally; No crackles, rhonchi, wheezing, or rubs  HEART: Regular rate and rhythm; No murmurs, rubs, or gallops  ABDOMEN: Soft, Nontender except very mild in RLQ, Nondistended; Bowel sounds present  EXTREMITIES:  2+ Peripheral Pulses, No clubbing, cyanosis, or edema. R anterior foot with healing abrasion/plaque.  SKIN: No rashes or lesions  NERVOUS SYSTEM:  Alert & Oriented, Good concentration

## 2019-09-29 NOTE — H&P ADULT - NSICDXPASTMEDICALHX_GEN_ALL_CORE_FT
PAST MEDICAL HISTORY:  Anticardiolipin Antibody Positive     CAD (Coronary Artery Disease)     Carotid dissection, bilateral     Hashimoto's thyroiditis     History of Appendectomy     History of Bilateral Breast Implants     History of Hernia Repair     Myocardial Infarct     S/P Botox Injection

## 2019-09-29 NOTE — PROGRESS NOTE ADULT - SUBJECTIVE AND OBJECTIVE BOX
====================  CCU MIDNIGHT ROUNDS  ====================    BENJA OWENS  [id number]  Patient is a 54y old  Female who presents with a chief complaint of NSTEMI (29 Sep 2019 11:43)      ====================  SUMMARY:  ====================  54 yr old female with pmhx of MI (2005- no PCI due to clean coronories, ? stress induced CM), Right renal artery fibromuscular dysplasia, bilateral carotid artery dissection- 2011, antiphospholipid antibody positive, insomnia, anxiety and Hashimoto's thyroiditis presented to [place] with chest pain radiating to arms,  with troponin 2.4, started on heparin and plavix, transferred to [facility] for management of  NSTEMI. S/P LHC today, with no coronary occlusion or dissection        ====================  NEW EVENTS:  ====================  S/P LHC- with normal coronaries and no dissection, with borderline normal LV function , EF=55%,   groin stable.      ====================  VITALS (Last 12 hrs):  ====================    T(C): 36.5 (09-29-19 @ 19:00), Max: 36.7 (09-29-19 @ 17:00)  T(F): 97.7 (09-29-19 @ 19:00), Max: 98.1 (09-29-19 @ 17:00)  HR: 48 (09-29-19 @ 21:00) (48 - 72)  BP: 117/56 (09-29-19 @ 21:00) (106/56 - 137/69)  BP(mean): 84 (09-29-19 @ 21:00) (75 - 112)  ABP: --  ABP(mean): --  RR: 21 (09-29-19 @ 21:00) (14 - 41)  SpO2: 98% (09-29-19 @ 19:00) (97% - 100%)  Wt(kg): --  CVP(mm Hg): --  CVP(cm H2O): --  CO: --  CI: --  PA: --  PA(mean): --  PCWP: --  SVR: --  PVR: --    I&O's Summary    29 Sep 2019 07:01  -  29 Sep 2019 21:43  --------------------------------------------------------  IN: 600 mL / OUT: 750 mL / NET: -150 mL            ====================  NEW LABS:  ====================                          13.2   8.0   )-----------( 245      ( 29 Sep 2019 12:42 )             40.6     09-29    139  |  102  |  13  ----------------------------<  100<H>  3.7   |  22  |  0.68    Ca    9.9      29 Sep 2019 12:45    TPro  7.1  /  Alb  4.4  /  TBili  0.3  /  DBili  x   /  AST  26  /  ALT  14  /  AlkPhos  70  09-29    PT/INR - ( 29 Sep 2019 12:42 )   PT: 10.9 sec;   INR: 0.96 ratio         PTT - ( 29 Sep 2019 12:42 )  PTT:106.2 sec  Creatine Kinase, Serum: 148 U/L (09-29-19 @ 12:45)    CKMB Units: 9.6 ng/mL (09-29 @ 12:45)          ====================  PLAN:  ====================  Cardio:   S/P chestpain with elevated troponins- S/P LHC with normal coronaries no dissection    Trend Cardiac enzymes   Continue with aspirin 325 mg   TTE in am - to evaluate for  ?stress induced CM    Prophylaxis Lovenox 40 mg daily from tomorrow, groin stable ====================  CCU MIDNIGHT ROUNDS  ====================    BENJA OWENS  [id number]  Patient is a 54y old  Female who presents with a chief complaint of NSTEMI (29 Sep 2019 11:43)      ====================  SUMMARY:  ====================  54 yr old female with pmhx of MI (2005- no PCI due to clean coronories, ? stress induced CM), Right renal artery fibromuscular dysplasia, bilateral carotid artery dissection- 2011, antiphospholipid antibody positive, insomnia, anxiety and Hashimoto's thyroiditis presented to [place] with chest pain radiating to arms,  with troponin 2.4, started on heparin and plavix, transferred to [facility] for management of  NSTEMI. S/P LHC today, with no coronary occlusion or dissection        ====================  NEW EVENTS:  ====================  S/P LHC- with normal coronaries and no dissection, with borderline normal LV function , EF=55%,   groin stable.      ====================  VITALS (Last 12 hrs):  ====================    T(C): 36.5 (09-29-19 @ 19:00), Max: 36.7 (09-29-19 @ 17:00)  T(F): 97.7 (09-29-19 @ 19:00), Max: 98.1 (09-29-19 @ 17:00)  HR: 48 (09-29-19 @ 21:00) (48 - 72)  BP: 117/56 (09-29-19 @ 21:00) (106/56 - 137/69)  BP(mean): 84 (09-29-19 @ 21:00) (75 - 112)  ABP: --  ABP(mean): --  RR: 21 (09-29-19 @ 21:00) (14 - 41)  SpO2: 98% (09-29-19 @ 19:00) (97% - 100%)  Wt(kg): --  CVP(mm Hg): --  CVP(cm H2O): --  CO: --  CI: --  PA: --  PA(mean): --  PCWP: --  SVR: --  PVR: --    I&O's Summary    29 Sep 2019 07:01  -  29 Sep 2019 21:43  --------------------------------------------------------  IN: 600 mL / OUT: 750 mL / NET: -150 mL            ====================  NEW LABS:  ====================                          13.2   8.0   )-----------( 245      ( 29 Sep 2019 12:42 )             40.6     09-29    139  |  102  |  13  ----------------------------<  100<H>  3.7   |  22  |  0.68    Ca    9.9      29 Sep 2019 12:45    TPro  7.1  /  Alb  4.4  /  TBili  0.3  /  DBili  x   /  AST  26  /  ALT  14  /  AlkPhos  70  09-29    PT/INR - ( 29 Sep 2019 12:42 )   PT: 10.9 sec;   INR: 0.96 ratio         PTT - ( 29 Sep 2019 12:42 )  PTT:106.2 sec  Creatine Kinase, Serum: 148 U/L (09-29-19 @ 12:45)    CKMB Units: 9.6 ng/mL (09-29 @ 12:45)          ====================  PLAN:  ====================  Cardio:   S/P chestpain with elevated troponins- S/P LHC with normal coronaries no dissection    Trend Cardiac enzymes   Continue with aspirin 325 mg   Hold verapamil for now- HR in 50s    TTE in am - to evaluate for  ?stress induced CM    Prophylaxis Lovenox 40 mg daily from tomorrow, groin stable ====================  CCU MIDNIGHT ROUNDS  ====================    BENJA OWENS  [id number]    Patient is a 54y old  Female who presents with a chief complaint of NSTEMI (29 Sep 2019 11:43)    ====================  SUMMARY:  ====================  54 yr old female with pmhx of MI (2005- no PCI due to clean coronories, ? stress induced CM), Right renal artery fibromuscular dysplasia, bilateral carotid artery dissection- 2011, antiphospholipid antibody positive, insomnia, anxiety and Hashimoto's thyroiditis presented to [place] with chest pain radiating to arms,  with troponin 2.4, started on heparin and plavix, transferred to [facility] for management of  NSTEMI. S/P LHC today, with no coronary occlusion or dissection    ====================    ====================  NEW EVENTS: S/P LHC- with normal coronaries and no dissection, with borderline normal LV function , EF=55%,  groin stable.  ====================    ====================  VITALS (Last 12 hrs):  ====================  T(C): 36.5 (09-29-19 @ 19:00), Max: 36.7 (09-29-19 @ 17:00)  T(F): 97.7 (09-29-19 @ 19:00), Max: 98.1 (09-29-19 @ 17:00)  HR: 48 (09-29-19 @ 21:00) (48 - 72)  BP: 117/56 (09-29-19 @ 21:00) (106/56 - 137/69)  BP(mean): 84 (09-29-19 @ 21:00) (75 - 112)  RR: 21 (09-29-19 @ 21:00) (14 - 41)  SpO2: 98% (09-29-19 @ 19:00) (97% - 100%)      I&O's Summary    29 Sep 2019 07:01  -  29 Sep 2019 21:43  --------------------------------------------------------  IN: 600 mL / OUT: 750 mL / NET: -150 mL      ====================  NEW LABS:  ====================  9/29                   13.2  8.0   )-----------( 245      ( 29 Sep 2019 12:42 )             40.6     09-29    139  |  102  |  13  ----------------------------<  100<H>  3.7   |  22  |  0.68    Ca    9.9      29 Sep 2019 12:45    TPro  7.1  /  Alb  4.4  /  TBili  0.3  /  DBili  x   /  AST  26  /  ALT  14  /  AlkPhos  70  09-29    PT/INR - ( 29 Sep 2019 12:42 )   PT: 10.9 sec;   INR: 0.96 ratio         PTT - ( 29 Sep 2019 12:42 )  PTT:106.2 sec  Creatine Kinase, Serum: 148 U/L (09-29-19 @ 12:45)    CKMB Units: 9.6 ng/mL (09-29 @ 12:45)    ====================  PLAN:  ====================  #NSTEMI s/p LHC normal coronaries   - Etiology of CP possible coronary spasm   - TTE w/ Definity in AM to r/o coronary dissection   - CE trended, negative x 2  - DC planning     #H/O carotid dissection (2011)  - Continue ASA 325mg, would reduce to low dose   - Holding Verapamil due to bradycardia     Celeste Santiago NP student     Taylor Romero CCU NP  [phone], [phone]

## 2019-09-29 NOTE — H&P ADULT - NSHPSOCIALHISTORY_GEN_ALL_CORE
Marital Status:  (   )    (   ) Single    (   )    (  )   Occupation:   Lives with: (  ) alone  (  ) children   (  ) spouse   (  ) parents  (  ) other    Substance Use (street drugs): (  ) never used  (  ) other:  Tobacco Usage:  (   ) never smoked   (   ) former smoker   (   ) current smoker  (     ) pack year  (        ) last cigarette date  Alcohol Usage:  Sexual History: Marital Status:  (   )    (   ) Single    (   )    (  )   Lives with: (  ) alone  (  ) children   (  ) spouse   (  ) parents  (  ) other    Substance Use (street drugs): (  ) never used  (  ) other:  Tobacco Usage:  (   ) never smoked   (   ) former smoker   (   ) current smoker  (     ) pack year  (        ) last cigarette date  Alcohol Usage: Marital Status:  ( x  )    (   ) Single    (   )    (  )   Lives with: (  ) alone  (  ) children   ( x ) spouse   (  ) parents  (  ) other    Substance Use (street drugs): ( x ) never used  (  ) other:  Tobacco Usage:  (   ) never smoked   (  x ) former smoker - college years briefly  (   ) current smoker  (     ) pack year  (        ) last cigarette date  Alcohol Usage: social, not qd

## 2019-09-29 NOTE — CONSULT NOTE ADULT - SUBJECTIVE AND OBJECTIVE BOX
[facility] Consultation    CHIEF COMPLAINT: Patient is a 54y old  Female who presents with a chief complaint of chrest pain    HPI:  Patient came to the emergency department with chest discomfort. She awakened early this morning with a central chest pain more prominent than she has had recently. Her pain is now better and she reports no dyspnea or palpitations. Described the discomfort as severe in nature without radiation    Past medical history is remarkable for myocardial infarction with possible stress-induced cardiomyopathy in the past, antiphospholipid antibody syndrome, bilateral carotid dissection, right renal artery fibromuscular dysplasia, Hashimoto's thyroiditis. Coronary angiography in 2005 revealed normal coronary arteries. Cardiac CT in 2016 revealed a calcium score of zero    PAST MEDICAL & SURGICAL HISTORY:  History of Hernia Repair  S/P Botox Injection  History of Bilateral Breast Implants  History of Appendectomy  Myocardial Infarct  CAD (Coronary Artery Disease)  Anticardiolipin Antibody Positive      SOCIAL HISTORY: no tob/etoh    FAMILY HISTORY: no CAD      MEDICATIONS  (STANDING): Xanax, Amitiza,  aspirin, Duloxetene, Klonopin, levothyroxine, verapamil  heparin  Infusion.  Unit(s)/Hr (6.5 mL/Hr) IV Continuous <Continuous>    MEDICATIONS  (PRN):  heparin  Injectable 3200 Unit(s) IV Push every 6 hours PRN For aPTT less than 40      Allergies    epidural medications (Rash)  epinephrine (Rash)      REVIEW OF SYSTEMS:    CONSTITUTIONAL: No weakness, fevers or chills  EYES: No visual changes, No diplopia  ENMT: No throat pain , No exudate  NECK: No pain or stiffness  RESPIRATORY: No cough, wheezing, hemoptysis; No shortness of breath  CARDIOVASCULAR:  chest pain as above, no palpitations  GASTROINTESTINAL: No abdominal pain. No nausea, vomiting, or hematemesis; No diarrhea or constipation. No melena or hematochezia.  GENITOURINARY: No dysuria, frequency or hematuria  NEUROLOGICAL: No numbness or weakness  SKIN: No itching or rash  All other review of systems is negative unless indicated above    VITAL SIGNS:   Vital Signs Last 24 Hrs  T(C): 36.9 (29 Sep 2019 09:30), Max: 36.9 (29 Sep 2019 07:30)  T(F): 98.4 (29 Sep 2019 09:30), Max: 98.4 (29 Sep 2019 07:30)  HR: 62 (29 Sep 2019 10:45) (57 - 72)  BP: 113/58 (29 Sep 2019 10:45) (106/56 - 158/86)  BP(mean): 75 (29 Sep 2019 10:45) (75 - 79)  RR: 20 (29 Sep 2019 10:45) (13 - 20)  SpO2: 100% (29 Sep 2019 10:45) (97% - 100%)      PHYSICAL EXAM:    Constitutional: NAD, awake and alert, well-developed  Eyes:  EOMI,  Pupils round, no lesions  ENMT: no exudate or erythema  Pulmonary: Non-labored, breath sounds are clear bilaterally, No wheezing, rales or rhonchi  Cardiovascular: PMI  non-displaced Regular S1 and S2, no murmurs, rubs, gallops or clicks  Gastrointestinal: Bowel Sounds present, soft, nontender.   Lymph: No peripheral edema. No cervical lymphadenopathy.  Neurological: Alert, no focal deficits  Skin: No rashes.  No cyanosis.  Psych:  Mood & affect appropriate    LABS: All Labs Reviewed:                        13.0   9.38  )-----------( 237      ( 28 Sep 2019 23:25 )             38.6     28 Sep 2019 23:25    141    |  106    |  15     ----------------------------<  71     3.2     |  12     |  0.81     Ca    9.2        28 Sep 2019 23:25    TPro  6.9    /  Alb  3.8    /  TBili  0.2    /  DBili  x      /  AST  20     /  ALT  23     /  AlkPhos  70     28 Sep 2019 23:25    PT/INR - ( 28 Sep 2019 23:25 )   PT: 10.8 sec;   INR: 0.95 ratio         PTT - ( 29 Sep 2019 08:51 )  PTT:29.1 sec  CARDIAC MARKERS ( 29 Sep 2019 06:15 )  2.390 ng/mL / x     / x     / x     / x      CARDIAC MARKERS ( 28 Sep 2019 23:25 )  <.015 ng/mL / x     / x     / x     / x          ECG: SR, septal MI TOMMIE, non spec ST/T abn      < from: CT Angio Chest w/ IV Cont (09.29.19 @ 01:18) >    EXAM:  CT ANGIO ABDOMEN ONLY (W)AW IC                          EXAM:  CT ANGIO CHEST (W)AW IC                            PROCEDURE DATE:  09/29/2019          INTERPRETATION:  HISTORY: Chest pain radiating down both arms. Headache   and elevated blood pressure. History of Anticardiolipin Antibody   Positive, fibromuscular dysplasia or with carotid dissection and   myocardial infarct. Evaluate for dissection.    TECHNIQUE: CT angiogram of the chest, abdomen and pelvis was performed   following dissection protocol. Precontrast images were obtained to the   chest, followed by postcontrast images through the chest, abdomen and   pelvis during the arterial phase. Coronal, sagittal and MIP reformatted   images are provided. 95 mls of Omnipaque 350was administered   intravenously without complication and 5 mls was discarded.    COMPARISON: CT of the abdomen and pelvis from 12/21/2018    FINDINGS:  The aorta is normal in caliber without evidence of dissection or   aneurysm. There is no contrastextravasation. No intramural hematoma is   present on the precontrast images. The great vessels off the aortic arch   are patent. The celiac, superior mesenteric, inferior mesenteric and both   renal arteries are patent. Both common iliac arteries are patent and   normal in caliber.    The thyroid gland is unremarkable.    There is no significant axillary, mediastinal or hilar adenopathy.    The heart is upper limits of normal in size with dilation of the left   ventricular chamber. There is a trace pericardial effusion which is new   from the prior study. The main pulmonary arteries are normal in caliber   without central filling defect to suggest pulmonary embolus.     Evaluation of the lung parenchyma demonstrates biapical pulmonary   scarring. There is no suspicious pulmonary nodule or mass. There is no   pneumothorax or pleural effusion. The trachea and main bronchi are clear.      The liver, spleen, pancreas, gallbladder and adrenal glands are   unremarkable aside from a stable subcentimeter low-attenuation lesions in   the liver too small to adequately characterize. The kidneys enhance   symmetrically without hydronephrosis.    The small and large bowel are normal in caliber without evidence of   obstruction. Status post appendectomy. There is no abnormal bowel wall   thickening. Moderate amount of retained fecal material seen throughout   the colon.    There is no retroperitoneal adenopathy or ascites.    The urinary bladder is grossly unremarkable. There is no pelvic soft  tissue mass. There is a 1.7 cm left adnexal cyst.    Status post bilateral subglandular breast implants. There is a pectus   excavatum deformity with surgical prosthesis overlying. There are no   acute osseous abnormalities.    IMPRESSION:   No aortic dissection or aneurysm.                LISA MADERA M.D., RADIOLOGIST  This document has been electronically signed. Sep 29 2019  2:33AM                < end of copied text >    < from: TTE with Doppler (w/Cont) (10.03.11 @ 15:30) >    Patient name: BENJA OWENS  YOB: 1965   Age: 46 (F)   MR#: [id number]  Study Date: 10/3/2011  Location: O/PSonographer: Tesha Holcomb [facility]  Study quality: Technically good  Referring Physician: Fernando Tamez MD  ------------------------------------------------------------------------  Blood Pressure: 111/67 mmHg  Height: 5ft 4in  Weight: 110 lb  BSA: 1.5 m2  ------------------------------------------------------------------------  PROCEDURE: Transthoracic echocardiogram with 2-D, M-Mode  and complete spectral and color flow Doppler.  Verbal consent was obtained for injection of echo contrast.  Following  intravenous injection of contrast, harmonic  imaging was performed.  INDICATION: Coronary Artery Disease (414.01)  ------------------------------------------------------------------------  DIMENSIONS:  Dimensions:     Normal Values:  LA:     3.3 cm    2.0 - 4.0 cm  Ao:     2.4 cm    2.0 - 3.8 cm  SEPTUM: 0.8 cm    0.6 - 1.2 cm  PWT:    0.8 cm    0.6 - 1.1 cm  LVIDd:  4.4 cm  3.0 - 5.6 cm  LVIDs:  3.0 cm    1.8 - 4.0 cm  Derived Variables:  LVMI: 72 g/m2  RWT: 0.36  Fractional short: 32 %  Ejection Fraction: 60 %  ------------------------------------------------------------------------  OBSERVATIONS:  Mitral Valve: Normal mitral valve.  Aortic Root: Normal aortic root.  Aortic Valve: Normal trileaflet aortic valve.  Left Atrium: Normal left atrium  Left Ventricle: Normal left ventricular internal dimensions  and wall thicknesses.  Endocardium not well visualized; grossly normal left  ventricular systolic function. Endocardial visualization  enhanced with intravenous injection of echo contrast  (Definity).  Mild diastolic dysfunction (Stage I).  Right Heart: Normal right atrium.  Normal right ventricular size andfunction.  Normal tricuspid valve. Minimal tricuspid regurgitation.  Normal pulmonic valve.  Pericardium/PleuraNormal pericardium with no pericardial  effusion.  ------------------------------------------------------------------------  CONCLUSIONS:  1. Normal left ventricular internal dimensions and wall  thicknesses.  2. Endocardium not well visualized; grossly normal left  ventricular systolic function. Endocardial visualization  enhanced with intravenous injection of echo contrast  (Definity).  3. Mild diastolic dysfunction (Stage I).  ------------------------------------------------------------------------  Confirmed on  10/3/2011 - 17:14:57 by Radha Moscoso M.D.  ------------------------------------------------------------------------    < end of copied text >

## 2019-09-29 NOTE — H&P ADULT - HISTORY OF PRESENT ILLNESS
53 yo F with hx MI (2005, no PCI), R renal artery fibromuscular dysplasia, h/o bilateral carotid artery dissection (2011), hx antiphospholipid antibody positive and Hashimoto's thyroiditis who presented to CCU as transfer from [facility] ED for further management/evaluation of NSTEMI, now s/p [facility] with no coronary occlusions or dissections.    Patient 53 yo F with hx MI (2005, no PCI due to clean coronaries, ?stress-induced CM), R renal artery fibromuscular dysplasia, h/o bilateral carotid artery dissection (2011), hx antiphospholipid antibody positive and Hashimoto's thyroiditis who presented to CCU as transfer from [facility] ED for further management/evaluation of NSTEMI, now s/p LHC with no coronary occlusions or dissections.    Patient presented to the [facility] ED on 9/28/19 c/o CP radiating down arms a/w HA (ROS otherwise negative then). CT head with no acute pathology, CT angio H&N with no acute pathology, CTA abd with no aortic dissection or aneurysm and CXR no infiltrate, pleural effusion or PTX. Troponin I elevated at 2.4. Started on heparin and Plavix there and transferred to [facility] CCU for further evaluation.    On admission to [facility] CCU, patient reported that she no longer has CP, and no SOB or palpitations. She underwent LHC with showed normal coronaries, with mild luminal irregularities with no flow-limiting lesions in the LCx artery.

## 2019-09-29 NOTE — H&P ADULT - NSHPLABSRESULTS_GEN_ALL_CORE
09-29    139  |  102  |  13  ----------------------------<  100<H>  3.7   |  22  |  0.68  09-28    141  |  106  |  15  ----------------------------<  71  3.2<L>   |  12<L>  |  0.81    Ca    9.9      29 Sep 2019 12:45  Ca    9.2      28 Sep 2019 23:25    TPro  7.1  /  Alb  4.4  /  TBili  0.3  /  DBili  x   /  AST  26  /  ALT  14  /  AlkPhos  70  09-29  TPro  6.9  /  Alb  3.8  /  TBili  0.2  /  DBili  x   /  AST  20  /  ALT  23  /  AlkPhos  70  09-28      PT/INR - ( 29 Sep 2019 12:42 )   PT: 10.9 sec;   INR: 0.96 ratio         PTT - ( 29 Sep 2019 12:42 )  PTT:106.2 sec                                        13.2   8.0   )-----------( 245      ( 29 Sep 2019 12:42 )             40.6                         13.0   9.38  )-----------( 237      ( 28 Sep 2019 23:25 )             38.6     CAPILLARY BLOOD GLUCOSE: NA    < from: Cardiac Cath Lab - Adult (09.29.19 @ 12:51) >    RADIATION EXPOSURE: 4.2 min.  CONTRAST GIVEN: Omnipaque 37 ml.  MEDICATIONS GIVEN: Benadryl, 25 mg, IV.  VENTRICLES: Global left ventricular function was borderline normal. EF  estimated was 55 %.  CORONARY VESSELS: The coronary circulation is right dominant.  LM:   --  LM: Normal.  LAD:   --  LAD: Normal.  CX:   --  Mid circumflex: Angiography showed minor luminal irregularities  with no flow limiting lesions.  RI:   --  Ramus intermedius: Normal.  RCA:   --  RCA: Normal.  COMPLICATIONS: There were no complications.  DIAGNOSTIC RECOMMENDATIONS: Medical management is recommended.    < end of copied text >

## 2019-09-29 NOTE — H&P ADULT - ATTENDING COMMENTS
Seen/examined. Agree with above.  chest pain with troponin leak. EKG without worrisome findings. CTA negative for PE. Cardiac cath without obstructive disease.  FMD history, so scad considered, though no evidence on cath.  Trend CE  echocardiogram  asa, statin

## 2019-09-29 NOTE — PATIENT PROFILE ADULT - NSPROHMSYMPCOND_GEN_A_NUR
CHOROIDAL CHANGES - NO DEF GROWTH TO SUGGEST TUMOR ON B SCAN - NO SIG CHANGES FROM 2016. cardiovascular

## 2019-09-29 NOTE — ED ADULT NURSE REASSESSMENT NOTE - GENERAL PATIENT STATE
resting/sleeping/family/SO at bedside
comfortable appearance
comfortable appearance
resting/sleeping
resting/sleeping/family/SO at bedside

## 2019-09-30 ENCOUNTER — MEDICATION RENEWAL (OUTPATIENT)
Age: 54
End: 2019-09-30

## 2019-09-30 ENCOUNTER — TRANSCRIPTION ENCOUNTER (OUTPATIENT)
Age: 54
End: 2019-09-30

## 2019-09-30 VITALS — HEART RATE: 62 BPM

## 2019-09-30 LAB
ALBUMIN SERPL ELPH-MCNC: 3.9 G/DL — SIGNIFICANT CHANGE UP (ref 3.3–5)
ALP SERPL-CCNC: 65 U/L — SIGNIFICANT CHANGE UP (ref 40–120)
ALT FLD-CCNC: 11 U/L — SIGNIFICANT CHANGE UP (ref 10–45)
ANION GAP SERPL CALC-SCNC: 11 MMOL/L — SIGNIFICANT CHANGE UP (ref 5–17)
AST SERPL-CCNC: 18 U/L — SIGNIFICANT CHANGE UP (ref 10–40)
BILIRUB SERPL-MCNC: 0.4 MG/DL — SIGNIFICANT CHANGE UP (ref 0.2–1.2)
BUN SERPL-MCNC: 8 MG/DL — SIGNIFICANT CHANGE UP (ref 7–23)
CALCIUM SERPL-MCNC: 9.2 MG/DL — SIGNIFICANT CHANGE UP (ref 8.4–10.5)
CHLORIDE SERPL-SCNC: 107 MMOL/L — SIGNIFICANT CHANGE UP (ref 96–108)
CK MB CFR SERPL CALC: 3.5 NG/ML — SIGNIFICANT CHANGE UP (ref 0–3.8)
CK SERPL-CCNC: 79 U/L — SIGNIFICANT CHANGE UP (ref 25–170)
CO2 SERPL-SCNC: 24 MMOL/L — SIGNIFICANT CHANGE UP (ref 22–31)
CREAT SERPL-MCNC: 0.74 MG/DL — SIGNIFICANT CHANGE UP (ref 0.5–1.3)
GLUCOSE SERPL-MCNC: 94 MG/DL — SIGNIFICANT CHANGE UP (ref 70–99)
HCT VFR BLD CALC: 44.8 % — SIGNIFICANT CHANGE UP (ref 34.5–45)
HGB BLD-MCNC: 12.2 G/DL — SIGNIFICANT CHANGE UP (ref 11.5–15.5)
MCHC RBC-ENTMCNC: 25.3 PG — LOW (ref 27–34)
MCHC RBC-ENTMCNC: 27.3 GM/DL — LOW (ref 32–36)
MCV RBC AUTO: 93 FL — SIGNIFICANT CHANGE UP (ref 80–100)
PLATELET # BLD AUTO: 255 K/UL — SIGNIFICANT CHANGE UP (ref 150–400)
POTASSIUM SERPL-MCNC: 3.7 MMOL/L — SIGNIFICANT CHANGE UP (ref 3.5–5.3)
POTASSIUM SERPL-SCNC: 3.7 MMOL/L — SIGNIFICANT CHANGE UP (ref 3.5–5.3)
PROT SERPL-MCNC: 6.4 G/DL — SIGNIFICANT CHANGE UP (ref 6–8.3)
RBC # BLD: 4.82 M/UL — SIGNIFICANT CHANGE UP (ref 3.8–5.2)
RBC # FLD: 11.6 % — SIGNIFICANT CHANGE UP (ref 10.3–14.5)
SODIUM SERPL-SCNC: 142 MMOL/L — SIGNIFICANT CHANGE UP (ref 135–145)
TROPONIN T, HIGH SENSITIVITY RESULT: 111 NG/L — HIGH (ref 0–51)
WBC # BLD: 8.1 K/UL — SIGNIFICANT CHANGE UP (ref 3.8–10.5)
WBC # FLD AUTO: 8.1 K/UL — SIGNIFICANT CHANGE UP (ref 3.8–10.5)

## 2019-09-30 PROCEDURE — C1887: CPT

## 2019-09-30 PROCEDURE — 83036 HEMOGLOBIN GLYCOSYLATED A1C: CPT

## 2019-09-30 PROCEDURE — 85027 COMPLETE CBC AUTOMATED: CPT

## 2019-09-30 PROCEDURE — C1769: CPT

## 2019-09-30 PROCEDURE — 80061 LIPID PANEL: CPT

## 2019-09-30 PROCEDURE — 82550 ASSAY OF CK (CPK): CPT

## 2019-09-30 PROCEDURE — 85610 PROTHROMBIN TIME: CPT

## 2019-09-30 PROCEDURE — 86850 RBC ANTIBODY SCREEN: CPT

## 2019-09-30 PROCEDURE — 84484 ASSAY OF TROPONIN QUANT: CPT

## 2019-09-30 PROCEDURE — 84443 ASSAY THYROID STIM HORMONE: CPT

## 2019-09-30 PROCEDURE — 86900 BLOOD TYPING SEROLOGIC ABO: CPT

## 2019-09-30 PROCEDURE — 86901 BLOOD TYPING SEROLOGIC RH(D): CPT

## 2019-09-30 PROCEDURE — 80053 COMPREHEN METABOLIC PANEL: CPT

## 2019-09-30 PROCEDURE — 84480 ASSAY TRIIODOTHYRONINE (T3): CPT

## 2019-09-30 PROCEDURE — 99232 SBSQ HOSP IP/OBS MODERATE 35: CPT

## 2019-09-30 PROCEDURE — C8929: CPT

## 2019-09-30 PROCEDURE — C1894: CPT

## 2019-09-30 PROCEDURE — 84436 ASSAY OF TOTAL THYROXINE: CPT

## 2019-09-30 PROCEDURE — 82553 CREATINE MB FRACTION: CPT

## 2019-09-30 PROCEDURE — 93458 L HRT ARTERY/VENTRICLE ANGIO: CPT

## 2019-09-30 PROCEDURE — 93005 ELECTROCARDIOGRAM TRACING: CPT

## 2019-09-30 PROCEDURE — 93306 TTE W/DOPPLER COMPLETE: CPT | Mod: 26

## 2019-09-30 PROCEDURE — 85730 THROMBOPLASTIN TIME PARTIAL: CPT

## 2019-09-30 RX ORDER — ACETAMINOPHEN 500 MG
650 TABLET ORAL ONCE
Refills: 0 | Status: COMPLETED | OUTPATIENT
Start: 2019-09-30 | End: 2019-09-30

## 2019-09-30 RX ORDER — ESZOPICLONE 2 MG/1
1 TABLET, COATED ORAL
Qty: 0 | Refills: 0 | DISCHARGE

## 2019-09-30 RX ORDER — LINACLOTIDE 145 UG/1
1 CAPSULE, GELATIN COATED ORAL
Qty: 0 | Refills: 0 | DISCHARGE

## 2019-09-30 RX ORDER — POTASSIUM CHLORIDE 20 MEQ
40 PACKET (EA) ORAL ONCE
Refills: 0 | Status: COMPLETED | OUTPATIENT
Start: 2019-09-30 | End: 2019-09-30

## 2019-09-30 RX ORDER — DULOXETINE HYDROCHLORIDE 30 MG/1
1 CAPSULE, DELAYED RELEASE ORAL
Qty: 0 | Refills: 0 | DISCHARGE

## 2019-09-30 RX ADMIN — Medication 650 MILLIGRAM(S): at 06:46

## 2019-09-30 RX ADMIN — DULOXETINE HYDROCHLORIDE 30 MILLIGRAM(S): 30 CAPSULE, DELAYED RELEASE ORAL at 06:12

## 2019-09-30 RX ADMIN — Medication 0.5 MILLIGRAM(S): at 06:13

## 2019-09-30 RX ADMIN — Medication 325 MILLIGRAM(S): at 06:13

## 2019-09-30 RX ADMIN — Medication 650 MILLIGRAM(S): at 06:22

## 2019-09-30 RX ADMIN — CHLORHEXIDINE GLUCONATE 1 APPLICATION(S): 213 SOLUTION TOPICAL at 05:14

## 2019-09-30 RX ADMIN — Medication 40 MILLIEQUIVALENT(S): at 06:13

## 2019-09-30 RX ADMIN — Medication 1 TABLET(S): at 13:23

## 2019-09-30 RX ADMIN — SERTRALINE 50 MILLIGRAM(S): 25 TABLET, FILM COATED ORAL at 06:12

## 2019-09-30 RX ADMIN — Medication 88 MICROGRAM(S): at 06:13

## 2019-09-30 NOTE — PROGRESS NOTE ADULT - ASSESSMENT
55 yo F with hx MI (2005, no PCI), R renal artery fibromuscular dysplasia, h/o bilateral carotid artery dissection (2011), hx antiphospholipid antibody positive and Hashimoto's thyroiditis who presented to CCU as transfer from [facility] ED for further management/evaluation of NSTEMI, now s/p LHC with no coronary occlusions or dissections.    PLAN:  #CV  - S/p radiating CP with elevated troponins --> C 9/29/19 with no coronary occlusions or dissections.  - MEDS:  mg qd, holding off Plavix given normal LHC. assess BP and start verapamil 80 mg q8h if hypertensive (uptitrate to home dose if still hypertensive).   S/p heparin - d/c'ed.   Start statin after lipid panel results.  - STUDIES: TTE today    #Pulm: no active issues  #Neuro: no active issues. Hx insomnia - on Klonopin and Lunesta at home nightly, continued.  #Psych: h/o anxiety on duloxetine and sertraline, continued.  #Renal: Hx R renal artery fibromuscular dysplasia. Cr normal, trend daily BMP.  #Heme: no active issues.  - DVT ppx Enoxaparin starting 9/30.  #Endo: Hx Hashimoto's thyroiditis. C/w levothyroxine home dose.  #GI: DASH-TLC diet. ?Hx of IBS-C, on Linzess at home, monitor for any symptoms.  #ID: No c/f infection. 55 yo F with hx MI (2005, no PCI), R renal artery fibromuscular dysplasia, h/o bilateral carotid artery dissection (2011), hx antiphospholipid antibody positive and Hashimoto's thyroiditis who presented to CCU as transfer from [facility] ED for further management/evaluation of NSTEMI, now s/p LHC with no coronary occlusions or dissections.    PLAN:  #CV  - S/p radiating CP with elevated troponins --> [facility] 9/29/19 with no coronary occlusions or dissections.  - MEDS:  mg qd, holding off Plavix given normal LHC. assess BP and start verapamil 80 mg q8h if hypertensive (uptitrate to home dose if still hypertensive).   S/p heparin - d/c'ed.   Start statin at outpatient discussion - lipid profile normal.  - STUDIES: TTE today, performed, report pending.  -- NOTE: patient took her own home verapamil 240 mg home med on her own, even when counseled that we will monitor her HR while ambulating first before giving verapamil.-+    #Pulm: no active issues  #Neuro: no active issues. Hx insomnia - on Klonopin and Lunesta at home nightly, continued.  #Psych: h/o anxiety on duloxetine and sertraline, continued.  #Renal: Hx R renal artery fibromuscular dysplasia. Cr normal, trend daily BMP.  #Heme: no active issues.  - DVT ppx Enoxaparin starting 9/30.  #Endo: Hx Hashimoto's thyroiditis. C/w levothyroxine home dose.  #GI: DASH-TLC diet. ?Hx of IBS-C, on Linzess at home, monitor for any symptoms.  #ID: No c/f infection.

## 2019-09-30 NOTE — DISCHARGE NOTE PROVIDER - NSDCFUSCHEDAPPT_GEN_ALL_CORE_FT
BENJA OWENS ; 10/07/2019 ; NPP Urology [facility]  BENJA OWENS ; 10/16/2019 ; NPP Cardio [address] BENJA Covarrubias ; 10/17/2019 ; NPP OB/GYN  [address] [facility]  BENJA OWENS ; 12/09/2019 ; NPP Cardio [address] [facility] BENJA OWENS ; 10/07/2019 ; NPP Urology [facility]  BENJA OWENS ; 10/16/2019 ; NPP Cardio [address] BENJA Covarrubias ; 10/17/2019 ; NPP OB/GYN  [address] [facility]  BENJA OWENS ; 12/09/2019 ; NPP Cardio [address] [facility] GRACIELA BENJA ; 10/07/2019 ; NPP Urology [facility]  BENJA OWENS ; 10/16/2019 ; NPP Cardio [address] OWENS BENJA ; 10/17/2019 ; NPP OB/GYN  [address] [facility]  BENJA OWENS ; 12/09/2019 ; NPP Cardio [address] [facility] GRACIELA BENJA ; 10/07/2019 ; NPP Urology [facility]  BENJA OWENS ; 10/16/2019 ; NPP Cardio [address] OWENS BENJA ; 10/17/2019 ; NPP OB/GYN  [facility]  BENJA OWENS ; 12/09/2019 ; NPP Cardio [address] [facility]

## 2019-09-30 NOTE — PROGRESS NOTE ADULT - SUBJECTIVE AND OBJECTIVE BOX
[facility] - Elvin Turner, Isai, Maribell, Teetee, Amari Porter  Office Number:  [phone]    Patient resting comfortably in bed in NAD.  Laying flat with no respiratory distress.  No complaints of chest pain, dyspnea, palpitations, PND, or orthopnea.  Feeling well this AM.    F/U for:  CP    Telemetry: NSR     MEDICATIONS  (STANDING):  ALPRAZolam 0.5 milliGRAM(s) Oral daily  aspirin 325 milliGRAM(s) Oral daily  chlorhexidine 4% Liquid 1 Application(s) Topical <User Schedule>  clonazePAM  Tablet 0.5 milliGRAM(s) Oral at bedtime  DULoxetine 30 milliGRAM(s) Oral daily  enoxaparin Injectable 40 milliGRAM(s) SubCutaneous daily  eszopiclone 3 milliGRAM(s) Oral at bedtime  levothyroxine 88 MICROGram(s) Oral daily  multivitamin 1 Tablet(s) Oral daily  sertraline 50 milliGRAM(s) Oral daily    MEDICATIONS  (PRN):  diphenhydrAMINE 25 milliGRAM(s) Oral every 6 hours PRN Rash and/or Itching      Allergies    epidural medications (Rash)  epinephrine (Rash)    Intolerances        Vital Signs Last 24 Hrs  T(C): 36.5 (30 Sep 2019 03:30), Max: 36.9 (29 Sep 2019 07:30)  T(F): 97.7 (30 Sep 2019 03:30), Max: 98.4 (29 Sep 2019 07:30)  HR: 56 (30 Sep 2019 06:30) (48 - 72)  BP: 133/65 (30 Sep 2019 06:30) (106/56 - 137/69)  BP(mean): 96 (30 Sep 2019 06:30) (75 - 112)  RR: 23 (30 Sep 2019 06:30) (14 - 41)  SpO2: 97% (29 Sep 2019 22:00) (97% - 100%)    I&O's Summary    29 Sep 2019 07:01  -  30 Sep 2019 06:41  --------------------------------------------------------  IN: 720 mL / OUT: 750 mL / NET: -30 mL        ON EXAM:    General: NAD, awake and alert, oriented x 3  HEENT: Mucous membranes are moist, anicteric  Lungs: Non-labored, breath sounds are clear bilaterally, No wheezing, rales or rhonchi  Cardiovascular: Regular, S1 and S2, no murmurs, rubs, or gallops  Gastrointestinal: Bowel Sounds present, soft, nontender.   Lymph: No peripheral edema. No lymphadenopathy.  Skin: No rashes or ulcers  Psych:  Mood & affect appropriate    LABS: All Labs Reviewed:                        12.2   8.1   )-----------( 255      ( 30 Sep 2019 04:34 )             44.8                         13.2   8.0   )-----------( 245      ( 29 Sep 2019 12:42 )             40.6                         13.0   9.38  )-----------( 237      ( 28 Sep 2019 23:25 )             38.6     30 Sep 2019 04:34    142    |  107    |  8      ----------------------------<  94     3.7     |  24     |  0.74   29 Sep 2019 12:45    139    |  102    |  13     ----------------------------<  100    3.7     |  22     |  0.68   28 Sep 2019 23:25    141    |  106    |  15     ----------------------------<  71     3.2     |  12     |  0.81     Ca    9.2        30 Sep 2019 04:34  Ca    9.9        29 Sep 2019 12:45  Ca    9.2        28 Sep 2019 23:25    TPro  6.4    /  Alb  3.9    /  TBili  0.4    /  DBili  x      /  AST  18     /  ALT  11     /  AlkPhos  65     30 Sep 2019 04:34  TPro  7.1    /  Alb  4.4    /  TBili  0.3    /  DBili  x      /  AST  26     /  ALT  14     /  AlkPhos  70     29 Sep 2019 12:45  TPro  6.9    /  Alb  3.8    /  TBili  0.2    /  DBili  x      /  AST  20     /  ALT  23     /  AlkPhos  70     28 Sep 2019 23:25    PT/INR - ( 29 Sep 2019 12:42 )   PT: 10.9 sec;   INR: 0.96 ratio         PTT - ( 29 Sep 2019 12:42 )  PTT:106.2 sec  CARDIAC MARKERS ( 30 Sep 2019 04:34 )  x     / x     / 79 U/L / x     / 3.5 ng/mL  CARDIAC MARKERS ( 29 Sep 2019 21:38 )  x     / x     / 100 U/L / x     / 5.1 ng/mL  CARDIAC MARKERS ( 29 Sep 2019 12:45 )  x     / x     / 148 U/L / x     / 9.6 ng/mL  CARDIAC MARKERS ( 29 Sep 2019 06:15 )  2.390 ng/mL / x     / x     / x     / x      CARDIAC MARKERS ( 28 Sep 2019 23:25 )  <.015 ng/mL / x     / x     / x     / x          Blood Culture:     09-29 @ 17:22  TSH: 0.85

## 2019-09-30 NOTE — DISCHARGE NOTE PROVIDER - HOSPITAL COURSE
55 yo F with hx MI (2005, no PCI, reported normal LHC then, ?coronary vasospasm vs stress-induced cardiomyopathy), R renal artery fibromuscular dysplasia, h/o bilateral carotid artery dissection (2011), hx antiphospholipid antibody positive and Hashimoto's thyroiditis who presented to CCU as transfer from [facility] ED for further management/evaluation of NSTEMI, now s/p LHC with no coronary occlusions or dissections. 53 yo F with hx MI (2005, no PCI, reportedly normal LHC then, ?coronary vasospasm vs stress-induced cardiomyopathy), R renal artery fibromuscular dysplasia, h/o bilateral carotid artery dissection (2011), hx antiphospholipid antibody positive and Hashimoto's thyroiditis who presented to CCU as transfer from [facility] ED for further management/evaluation of NSTEMI, now s/p LHC with no coronary occlusions or dissections.        Patient presented to the [facility] ED on 9/28/19 c/o CP radiating down arms a/w HA (ROS otherwise negative then). CT head with no acute pathology, CT angio H&N with no acute pathology, CTA abd with no aortic dissection or aneurysm and CXR no infiltrate, pleural effusion or PTX. Troponin I elevated at 2.4. Started on heparin and Plavix there and transferred to [facility] CCU for further evaluation.        On admission to [facility] CCU, patient reported that she no longer has CP, and no SOB or palpitations. She underwent LHC with showed normal coronaries, with mild luminal irregularities with no flow-limiting lesions in the LCx artery. TTE showed mild MR, mild segmental LV systolic dysfunction, diffuse mid anterior septum, the basal  inferolateral wall, the mid anterior wall, and the mid inferoseptum hypokinesis; and mild diastolic dysfunction (stage 1). The inferolateral wall hypokinesis is new per report. No hemodynamic instability while in the CCU. Patient's troponin T downtrended (peak 188) and patient reported no CP on discharge. Continued on home meds (including ASA, no Plavix or Brilinta), except on verapamil given lower HR, but patient self-administered home verapamil 240 mg ER on day of discharge with no symptomatic complaints afterwards.        Patient stable for discharge and follow-up with outpatient cardiologist.

## 2019-09-30 NOTE — PROGRESS NOTE ADULT - ATTENDING COMMENTS
Patient is seen and examined with fellow, NP and the CCU house-staff. I agree with the history, physical and the assessment and plan.  chest pain with positive troponins s/p cath with normal coronary anatomy  on verapamil for her h/o vasospasm  on asa  f/u with TTE

## 2019-09-30 NOTE — DISCHARGE NOTE NURSING/CASE MANAGEMENT/SOCIAL WORK - PATIENT PORTAL LINK FT
You can access the FollowMyHealth Patient Portal offered by [facility] by registering at the following website: [email or web address]. By joining [employer]’s FollowMyHealth portal, you will also be able to view your health information using other applications (apps) compatible with our system.

## 2019-09-30 NOTE — PROGRESS NOTE ADULT - ASSESSMENT
53 yo F with hx MI (2005, no PCI due to clean coronaries, ?stress-induced CM), R renal artery fibromuscular dysplasia, h/o bilateral carotid artery dissection (2011), hx antiphospholipid antibody positive and Hashimoto's thyroiditis who presented to CCU as transfer from [facility] ED for further management/evaluation of NSTEMI, now s/p [facility] with no coronary occlusions or dissections.    - Cath normal  - Continue Aspirin  - Resume home dose of verapamil on d/c  - Echo this AM  - No objection to d/c home today with outpatient follow up  - Monitor and replete esme

## 2019-09-30 NOTE — DISCHARGE NOTE PROVIDER - CARE PROVIDER_API CALL
Segundo Talbot (DO)  Internal Medicine; Nuclear Cardiology  [address] [place] [address]  Phone: [phone]  Fax: [phone]  Follow Up Time: 1 week

## 2019-09-30 NOTE — DISCHARGE NOTE PROVIDER - NSDCCPCAREPLAN_GEN_ALL_CORE_FT
PRINCIPAL DISCHARGE DIAGNOSIS  Diagnosis: Chest pain  Assessment and Plan of Treatment: Your chest pain was initially concerning for an acute coronary syndrome/heart attack given the rise in troponin enzymes, but the coronary angiogram/catheterization was negative for any coronary artery blockages. In addition, the angiogram did not show any evidence of coronary artery dissection. The possibility of coronary artery vasospasm leading to chest pain is likely. The echocardiogram you had on day of discharge showed normal range of heart pumping (EF 45-50%) with some areas of the heart moving less than optimal. Given that your vital signs continued to be in the normal range throughout your CCU stay and that you did not have symptoms, we were not concerned for any sudden decompensation in your heart function. You should follow-up with your outpatient cardiologist as soon as possible. Although your lipid blood tests were normal here in the hospital, you should discuss with your outpatient doctors regarding starting an statin. If your chest pain returns, please visit the emergency room as soon as possible.

## 2019-09-30 NOTE — DISCHARGE NOTE PROVIDER - NSDCFUADDINST_GEN_ALL_CORE_FT
Rest for the remainder of this week, and slowly resume your exercise routine as tolerated next week. Try to avoid direct impact to the groin site where the sheath was placed.

## 2019-10-01 ENCOUNTER — TRANSCRIPTION ENCOUNTER (OUTPATIENT)
Age: 54
End: 2019-10-01

## 2019-10-02 ENCOUNTER — APPOINTMENT (OUTPATIENT)
Dept: CARDIOLOGY | Facility: CLINIC | Age: 54
End: 2019-10-02
Payer: COMMERCIAL

## 2019-10-02 ENCOUNTER — NON-APPOINTMENT (OUTPATIENT)
Age: 54
End: 2019-10-02

## 2019-10-02 VITALS
SYSTOLIC BLOOD PRESSURE: 138 MMHG | BODY MASS INDEX: 19.12 KG/M2 | HEIGHT: 64 IN | WEIGHT: 112 LBS | OXYGEN SATURATION: 98 % | HEART RATE: 48 BPM | DIASTOLIC BLOOD PRESSURE: 86 MMHG

## 2019-10-02 PROCEDURE — 93000 ELECTROCARDIOGRAM COMPLETE: CPT

## 2019-10-02 PROCEDURE — 99215 OFFICE O/P EST HI 40 MIN: CPT

## 2019-10-02 NOTE — HISTORY OF PRESENT ILLNESS
[FreeTextEntry1] : Mrs. 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 She has had low normal LV function since then.  She also has a history of bilateral carotid dissections that completely healed, as well as a recent diagnosis of FMD of the right renal artery, and Hashimoto's thyroiditis.  She had previously been on AC with Coumadin, but is currently only on aspirin therapy.  \par \par Last month, 9/19 the patient had a recurrent episode of chest pain, headache, and diaphoresis that was similar to the event in 2005. She went to the emergency room where her initial troponin was normal.\par The troponin level increased to 2.4 and she was sent for plain vitamin Connell for cardiac catheterization. This showed normal arteries with an ejection fraction of 55%. Echocardiogram showed ejection fraction of 45-50% with hypocontractility of the anterior wall and septum. A peak troponin T went up to 180. Was felt that she had a small myocardial infarction on the basis of spasm or blood clot. Her medications were not changed. In the hospital cholesterol was 178, triglycerides 34, HDL 88, and LDL 83 . Blood pressure initially at home has been elevated. She had no more chest pain but is complaining of some epigastric discomfort that is probably GI\par \par MRA scan of the neck 7/17 was normal. She had a CT angiogram of the heart 3/16 showed a calcium score 0 without any coronary disease.She had a recent carotid Doppler that according to the patient demonstrates mild plaque.\par \par A resting 12-lead electrocardiogram demonstrates sinus rhythm. There is poor R. progression in the anterior leads with nonspecific T waves. This represents no acute change.\par \par

## 2019-10-02 NOTE — PHYSICAL EXAM
[Normal Appearance] : normal appearance [Well Groomed] : well groomed [General Appearance - In No Acute Distress] : no acute distress [Normal Conjunctiva] : the conjunctiva exhibited no abnormalities [Eyelids - No Xanthelasma] : the eyelids demonstrated no xanthelasmas [Normal Oral Mucosa] : normal oral mucosa [No Oral Pallor] : no oral pallor [No Oral Cyanosis] : no oral cyanosis [Normal Oropharynx] : normal oropharynx [Normal Jugular Venous A Waves Present] : normal jugular venous A waves present [Normal Jugular Venous V Waves Present] : normal jugular venous V waves present [No Jugular Venous Cárdenas A Waves] : no jugular venous cárdenas A waves [Respiration, Rhythm And Depth] : normal respiratory rhythm and effort [Exaggerated Use Of Accessory Muscles For Inspiration] : no accessory muscle use [Auscultation Breath Sounds / Voice Sounds] : lungs were clear to auscultation bilaterally [Bowel Sounds] : normal bowel sounds [Abdomen Soft] : soft [Abdomen Tenderness] : non-tender [Abdomen Mass (___ Cm)] : no abdominal mass palpated [Abnormal Walk] : normal gait [Gait - Sufficient For Exercise Testing] : the gait was sufficient for exercise testing [Nail Clubbing] : no clubbing of the fingernails [Cyanosis, Localized] : no localized cyanosis [Petechial Hemorrhages (___cm)] : no petechial hemorrhages [Skin Color & Pigmentation] : normal skin color and pigmentation [] : no rash [No Venous Stasis] : no venous stasis [Skin Lesions] : no skin lesions [Oriented To Time, Place, And Person] : oriented to person, place, and time [Affect] : the affect was normal [Mood] : the mood was normal [No Anxiety] : not feeling anxious [5th Left ICS - MCL] : palpated at the 5th LICS in the midclavicular line [Normal] : normal [No Precordial Heave] : no precordial heave was noted [Normal Rate] : normal [Rhythm Regular] : regular [Normal S1] : normal S1 [Normal S2] : normal S2 [No Gallop] : no gallop heard [I] : a grade 1 [2+] : left 2+ [No Abnormalities] : the abdominal aorta was not enlarged and no bruit was heard [No Pitting Edema] : no pitting edema present [FreeTextEntry1] : No abdominal bruits [S3] : no S3 [S4] : no S4 [Right Carotid Bruit] : no bruit heard over the right carotid [Left Carotid Bruit] : no bruit heard over the left carotid

## 2019-10-02 NOTE — CARDIOLOGY SUMMARY
[No Ischemia] : no Ischemia [LVEF ___%] : LVEF [unfilled]% [None] : no mitral regurgitation [Normal] : normal [___] : [unfilled] [___] : [unfilled]

## 2019-10-02 NOTE — DISCUSSION/SUMMARY
[FreeTextEntry1] : The patient had a second episode of myocardial infarction with normal coronary arteries. She does have some mild segmental left ventricular dysfunction which is much less severe than the episode in 2005.\par \par She'll stay on her present medication. Blood pressure is well-controlled. She is no evidence of plaque either by cardiac catheterization, both by MRA scans of her cerebral vessels. I therefore do not think she needs a statin medication.\par \par She'll go for repeat anti-Cardiolipin antibody levels. If all is well I will repeat her echo in 2 months with the hopes that her ejection fraction returned to baseline. If she has any additional symptoms or problems she will call me.She will gradually return to normal activities.\par \par This was all discussed with the patient in detail. I reviewed her hospitalization including her testing and answered all her questions.

## 2019-10-04 ENCOUNTER — CLINICAL ADVICE (OUTPATIENT)
Age: 54
End: 2019-10-04

## 2019-10-07 ENCOUNTER — NON-APPOINTMENT (OUTPATIENT)
Age: 54
End: 2019-10-07

## 2019-10-07 ENCOUNTER — APPOINTMENT (OUTPATIENT)
Dept: CARDIOLOGY | Facility: CLINIC | Age: 54
End: 2019-10-07
Payer: COMMERCIAL

## 2019-10-07 VITALS
OXYGEN SATURATION: 100 % | BODY MASS INDEX: 19.22 KG/M2 | DIASTOLIC BLOOD PRESSURE: 72 MMHG | SYSTOLIC BLOOD PRESSURE: 116 MMHG | HEART RATE: 69 BPM | WEIGHT: 112 LBS

## 2019-10-07 DIAGNOSIS — R10.32 LEFT LOWER QUADRANT PAIN: ICD-10-CM

## 2019-10-07 PROCEDURE — 99214 OFFICE O/P EST MOD 30 MIN: CPT

## 2019-10-07 PROCEDURE — 93000 ELECTROCARDIOGRAM COMPLETE: CPT

## 2019-10-07 NOTE — PHYSICAL EXAM
[General Appearance - Well Developed] : well developed [Normal Appearance] : normal appearance [Well Groomed] : well groomed [General Appearance - Well Nourished] : well nourished [No Deformities] : no deformities [Normal Conjunctiva] : the conjunctiva exhibited no abnormalities [General Appearance - In No Acute Distress] : no acute distress [Normal Oral Mucosa] : normal oral mucosa [Eyelids - No Xanthelasma] : the eyelids demonstrated no xanthelasmas [No Oral Cyanosis] : no oral cyanosis [No Oral Pallor] : no oral pallor [Normal Jugular Venous V Waves Present] : normal jugular venous V waves present [Normal Jugular Venous A Waves Present] : normal jugular venous A waves present [No Jugular Venous Cárdenas A Waves] : no jugular venous cárdenas A waves [Respiration, Rhythm And Depth] : normal respiratory rhythm and effort [Exaggerated Use Of Accessory Muscles For Inspiration] : no accessory muscle use [Auscultation Breath Sounds / Voice Sounds] : lungs were clear to auscultation bilaterally [Heart Rate And Rhythm] : heart rate and rhythm were normal [Heart Sounds] : normal S1 and S2 [Murmurs] : no murmurs present [Abdomen Tenderness] : non-tender [Abdomen Soft] : soft [Gait - Sufficient For Exercise Testing] : the gait was sufficient for exercise testing [Abdomen Mass (___ Cm)] : no abdominal mass palpated [Abnormal Walk] : normal gait [Nail Clubbing] : no clubbing of the fingernails [Cyanosis, Localized] : no localized cyanosis [Petechial Hemorrhages (___cm)] : no petechial hemorrhages [Skin Color & Pigmentation] : normal skin color and pigmentation [] : no rash [No Venous Stasis] : no venous stasis [Skin Lesions] : no skin lesions [No Skin Ulcers] : no skin ulcer [Oriented To Time, Place, And Person] : oriented to person, place, and time [No Xanthoma] : no  xanthoma was observed [Mood] : the mood was normal [Affect] : the affect was normal [No Anxiety] : not feeling anxious

## 2019-10-07 NOTE — REASON FOR VISIT
[Follow-Up - From Hospitalization] : follow-up of a recent hospitalization for [FreeTextEntry1] : add on visit for abdominal pain.  Has already seen Dr. Ziegler and Dr. Turner since her admission for NSTEMI 1 week ago.  \par \par Complains of consitpation x 1 week, then had BM. \par Abdominal pain weds.  Vomiting Thursday night and Friday morning.  Friday, had gradual onset of abdominal pain.  Pain was very severe.  Pain in epigastrum.  Never had this pain before.  It was dull in nature.  Had vomiting.  Has not subsided.  BP has been ok recently.  \par \par BP today in the office 116/72.  Vomiting has resolved.  \par \par Now she feels normal.  \par She was worried about this.  She is trying to see if she can see GI soon.\par \par Was seen by Dr. Ziegler - was recommended Plavix and Losartan.  was not started on this yet.  \par \par Medications:\par Aspirin 325 mg daily \par Verapamil 240mg\par Cetriline 50 \par Cymbalta 30\par Synthroid 88\par Conazepam 0.5\par Linzess\par

## 2019-10-09 ENCOUNTER — APPOINTMENT (OUTPATIENT)
Dept: ULTRASOUND IMAGING | Facility: HOSPITAL | Age: 54
End: 2019-10-09
Payer: COMMERCIAL

## 2019-10-09 ENCOUNTER — OUTPATIENT (OUTPATIENT)
Dept: OUTPATIENT SERVICES | Facility: HOSPITAL | Age: 54
LOS: 1 days | End: 2019-10-09
Payer: COMMERCIAL

## 2019-10-09 ENCOUNTER — APPOINTMENT (OUTPATIENT)
Dept: GASTROENTEROLOGY | Facility: CLINIC | Age: 54
End: 2019-10-09

## 2019-10-09 DIAGNOSIS — R10.32 LEFT LOWER QUADRANT PAIN: ICD-10-CM

## 2019-10-09 DIAGNOSIS — Z00.00 ENCOUNTER FOR GENERAL ADULT MEDICAL EXAMINATION WITHOUT ABNORMAL FINDINGS: ICD-10-CM

## 2019-10-09 DIAGNOSIS — Z90.49 ACQUIRED ABSENCE OF OTHER SPECIFIED PARTS OF DIGESTIVE TRACT: Chronic | ICD-10-CM

## 2019-10-09 PROCEDURE — 74019 RADEX ABDOMEN 2 VIEWS: CPT | Mod: 26

## 2019-10-09 PROCEDURE — 74019 RADEX ABDOMEN 2 VIEWS: CPT

## 2019-10-09 PROCEDURE — 93975 VASCULAR STUDY: CPT | Mod: 26

## 2019-10-09 PROCEDURE — 93975 VASCULAR STUDY: CPT

## 2019-10-16 ENCOUNTER — APPOINTMENT (OUTPATIENT)
Dept: CARDIOLOGY | Facility: CLINIC | Age: 54
End: 2019-10-16

## 2019-10-16 DIAGNOSIS — E87.6 HYPOKALEMIA: ICD-10-CM

## 2019-10-17 ENCOUNTER — APPOINTMENT (OUTPATIENT)
Dept: OBGYN | Facility: CLINIC | Age: 54
End: 2019-10-17

## 2019-11-13 ENCOUNTER — APPOINTMENT (OUTPATIENT)
Dept: CARDIOLOGY | Facility: CLINIC | Age: 54
End: 2019-11-13

## 2019-12-09 ENCOUNTER — LABORATORY RESULT (OUTPATIENT)
Age: 54
End: 2019-12-09

## 2019-12-09 LAB
CK MB BLD-MCNC: 2.6 NG/ML
CK SERPL-CCNC: 108 U/L

## 2019-12-13 ENCOUNTER — APPOINTMENT (OUTPATIENT)
Dept: OBGYN | Facility: CLINIC | Age: 54
End: 2019-12-13

## 2019-12-16 ENCOUNTER — OUTPATIENT (OUTPATIENT)
Dept: OUTPATIENT SERVICES | Facility: HOSPITAL | Age: 54
LOS: 1 days | End: 2019-12-16
Payer: COMMERCIAL

## 2019-12-16 ENCOUNTER — APPOINTMENT (OUTPATIENT)
Dept: CARDIOLOGY | Facility: CLINIC | Age: 54
End: 2019-12-16

## 2019-12-16 DIAGNOSIS — Z90.49 ACQUIRED ABSENCE OF OTHER SPECIFIED PARTS OF DIGESTIVE TRACT: Chronic | ICD-10-CM

## 2019-12-16 DIAGNOSIS — R10.32 LEFT LOWER QUADRANT PAIN: ICD-10-CM

## 2019-12-16 PROCEDURE — 74019 RADEX ABDOMEN 2 VIEWS: CPT | Mod: 26

## 2019-12-16 PROCEDURE — 74019 RADEX ABDOMEN 2 VIEWS: CPT

## 2019-12-18 ENCOUNTER — APPOINTMENT (OUTPATIENT)
Dept: CARDIOLOGY | Facility: CLINIC | Age: 54
End: 2019-12-18

## 2019-12-18 ENCOUNTER — APPOINTMENT (OUTPATIENT)
Dept: CV DIAGNOSITCS | Facility: HOSPITAL | Age: 54
End: 2019-12-18

## 2019-12-22 ENCOUNTER — TRANSCRIPTION ENCOUNTER (OUTPATIENT)
Age: 54
End: 2019-12-22

## 2019-12-28 LAB
ANION GAP SERPL CALC-SCNC: 13 MMOL/L
BUN SERPL-MCNC: 14 MG/DL
CALCIUM SERPL-MCNC: 9.9 MG/DL
CHLORIDE SERPL-SCNC: 101 MMOL/L
CO2 SERPL-SCNC: 26 MMOL/L
CREAT SERPL-MCNC: 0.77 MG/DL
GLUCOSE SERPL-MCNC: 85 MG/DL
HCYS SERPL-MCNC: 15.1 UMOL/L
POTASSIUM SERPL-SCNC: 3.8 MMOL/L
SODIUM SERPL-SCNC: 140 MMOL/L

## 2020-01-03 ENCOUNTER — OUTPATIENT (OUTPATIENT)
Dept: OUTPATIENT SERVICES | Facility: HOSPITAL | Age: 55
LOS: 1 days | End: 2020-01-03
Payer: COMMERCIAL

## 2020-01-03 ENCOUNTER — APPOINTMENT (OUTPATIENT)
Dept: ULTRASOUND IMAGING | Facility: HOSPITAL | Age: 55
End: 2020-01-03

## 2020-01-03 DIAGNOSIS — Z90.49 ACQUIRED ABSENCE OF OTHER SPECIFIED PARTS OF DIGESTIVE TRACT: Chronic | ICD-10-CM

## 2020-01-03 DIAGNOSIS — R10.32 LEFT LOWER QUADRANT PAIN: ICD-10-CM

## 2020-01-03 PROCEDURE — 93975 VASCULAR STUDY: CPT

## 2020-01-03 PROCEDURE — 93975 VASCULAR STUDY: CPT | Mod: 26

## 2020-01-17 ENCOUNTER — APPOINTMENT (OUTPATIENT)
Dept: UROLOGY | Facility: CLINIC | Age: 55
End: 2020-01-17
Payer: COMMERCIAL

## 2020-01-17 DIAGNOSIS — N95.2 POSTMENOPAUSAL ATROPHIC VAGINITIS: ICD-10-CM

## 2020-01-17 PROCEDURE — 99215 OFFICE O/P EST HI 40 MIN: CPT

## 2020-01-22 ENCOUNTER — APPOINTMENT (OUTPATIENT)
Dept: CV DIAGNOSITCS | Facility: HOSPITAL | Age: 55
End: 2020-01-22

## 2020-01-22 ENCOUNTER — OUTPATIENT (OUTPATIENT)
Dept: OUTPATIENT SERVICES | Facility: HOSPITAL | Age: 55
LOS: 1 days | End: 2020-01-22
Payer: COMMERCIAL

## 2020-01-22 DIAGNOSIS — I25.10 ATHEROSCLEROTIC HEART DISEASE OF NATIVE CORONARY ARTERY WITHOUT ANGINA PECTORIS: ICD-10-CM

## 2020-01-22 DIAGNOSIS — Z90.49 ACQUIRED ABSENCE OF OTHER SPECIFIED PARTS OF DIGESTIVE TRACT: Chronic | ICD-10-CM

## 2020-01-22 PROCEDURE — 93306 TTE W/DOPPLER COMPLETE: CPT | Mod: 26

## 2020-01-22 PROCEDURE — C8929: CPT

## 2020-01-29 ENCOUNTER — APPOINTMENT (OUTPATIENT)
Dept: CARDIOLOGY | Facility: CLINIC | Age: 55
End: 2020-01-29
Payer: COMMERCIAL

## 2020-01-29 ENCOUNTER — NON-APPOINTMENT (OUTPATIENT)
Age: 55
End: 2020-01-29

## 2020-01-29 VITALS
WEIGHT: 116 LBS | BODY MASS INDEX: 19.81 KG/M2 | HEIGHT: 64 IN | SYSTOLIC BLOOD PRESSURE: 120 MMHG | HEART RATE: 70 BPM | OXYGEN SATURATION: 99 % | DIASTOLIC BLOOD PRESSURE: 80 MMHG

## 2020-01-29 DIAGNOSIS — Z87.59 PERSONAL HISTORY OF OTHER COMPLICATIONS OF PREGNANCY, CHILDBIRTH AND THE PUERPERIUM: ICD-10-CM

## 2020-01-29 PROCEDURE — 99214 OFFICE O/P EST MOD 30 MIN: CPT

## 2020-01-29 PROCEDURE — 93000 ELECTROCARDIOGRAM COMPLETE: CPT

## 2020-01-29 NOTE — ASSESSMENT
[FreeTextEntry1] : Assessment:\par 1.  SMA dissection -improving\par 2.  LV dysfunction - resolved\par 3.  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  Mesenteric Artery duplex in 3 months \par 2.  Agree with Plavix 75mg daily and aspirin 81 mg daily \par 3.  MRA head and neck October 2020. \par 4.  Continue current medication regimen\par 5.  Echo from Jan 2020 improved.  \par 6.  Return after mesenteric artery duplex.

## 2020-01-29 NOTE — PHYSICAL EXAM
[General Appearance - Well Developed] : well developed [Normal Appearance] : normal appearance [Well Groomed] : well groomed [General Appearance - Well Nourished] : well nourished [No Deformities] : no deformities [General Appearance - In No Acute Distress] : no acute distress [Normal Conjunctiva] : the conjunctiva exhibited no abnormalities [Eyelids - No Xanthelasma] : the eyelids demonstrated no xanthelasmas [Normal Oral Mucosa] : normal oral mucosa [No Oral Pallor] : no oral pallor [No Oral Cyanosis] : no oral cyanosis [Normal Jugular Venous A Waves Present] : normal jugular venous A waves present [Normal Jugular Venous V Waves Present] : normal jugular venous V waves present [No Jugular Venous Cárdenas A Waves] : no jugular venous cárdenas A waves [Respiration, Rhythm And Depth] : normal respiratory rhythm and effort [Exaggerated Use Of Accessory Muscles For Inspiration] : no accessory muscle use [Auscultation Breath Sounds / Voice Sounds] : lungs were clear to auscultation bilaterally [Heart Rate And Rhythm] : heart rate and rhythm were normal [Heart Sounds] : normal S1 and S2 [Murmurs] : no murmurs present [Abdomen Soft] : soft [Abdomen Tenderness] : non-tender [Abdomen Mass (___ Cm)] : no abdominal mass palpated [Abnormal Walk] : normal gait [Gait - Sufficient For Exercise Testing] : the gait was sufficient for exercise testing [Nail Clubbing] : no clubbing of the fingernails [Cyanosis, Localized] : no localized cyanosis [Petechial Hemorrhages (___cm)] : no petechial hemorrhages [Skin Color & Pigmentation] : normal skin color and pigmentation [] : no rash [No Venous Stasis] : no venous stasis [Skin Lesions] : no skin lesions [No Skin Ulcers] : no skin ulcer [No Xanthoma] : no  xanthoma was observed [Oriented To Time, Place, And Person] : oriented to person, place, and time [Affect] : the affect was normal [Mood] : the mood was normal [No Anxiety] : not feeling anxious

## 2020-01-29 NOTE — REASON FOR VISIT
[Follow-Up - Clinic] : a clinic follow-up of [FreeTextEntry1] :  Followup 1/29/2020\par \par Doing ok.  Here with her .  no bleeding in urine or stool\par Abdomen feels well.\par no chest pain\par Will need to have breast implants removed, they are recalled.  \par No CP\par Exercising without issues over the weekend\par Echo most recently with improved LV function. \par \par \par \par Medications:\par Aspirin 81mg daily \par Plavix 75mg \par Verapamil 180mg\par Losartan 25\par Cetriline 50 \par Cymbalta 30\par Synthroid 88\par Clonazepam 0.5\par Linzess\par

## 2020-02-03 ENCOUNTER — RX RENEWAL (OUTPATIENT)
Age: 55
End: 2020-02-03

## 2020-03-12 ENCOUNTER — RX RENEWAL (OUTPATIENT)
Age: 55
End: 2020-03-12

## 2020-03-13 ENCOUNTER — RX RENEWAL (OUTPATIENT)
Age: 55
End: 2020-03-13

## 2020-04-01 ENCOUNTER — FORM ENCOUNTER (OUTPATIENT)
Age: 55
End: 2020-04-01

## 2020-04-02 ENCOUNTER — APPOINTMENT (OUTPATIENT)
Dept: OBGYN | Facility: CLINIC | Age: 55
End: 2020-04-02
Payer: COMMERCIAL

## 2020-04-02 PROCEDURE — 99213 OFFICE O/P EST LOW 20 MIN: CPT

## 2020-04-08 ENCOUNTER — LABORATORY RESULT (OUTPATIENT)
Age: 55
End: 2020-04-08

## 2020-04-08 ENCOUNTER — NON-APPOINTMENT (OUTPATIENT)
Age: 55
End: 2020-04-08

## 2020-04-08 ENCOUNTER — APPOINTMENT (OUTPATIENT)
Dept: CARDIOLOGY | Facility: CLINIC | Age: 55
End: 2020-04-08
Payer: COMMERCIAL

## 2020-04-08 VITALS
WEIGHT: 116 LBS | BODY MASS INDEX: 19.81 KG/M2 | HEIGHT: 64 IN | SYSTOLIC BLOOD PRESSURE: 121 MMHG | OXYGEN SATURATION: 99 % | HEART RATE: 68 BPM | DIASTOLIC BLOOD PRESSURE: 79 MMHG

## 2020-04-08 DIAGNOSIS — R07.9 CHEST PAIN, UNSPECIFIED: ICD-10-CM

## 2020-04-08 LAB
ALBUMIN SERPL ELPH-MCNC: 4.8 G/DL
ALP BLD-CCNC: 68 U/L
ALT SERPL-CCNC: 14 U/L
ANION GAP SERPL CALC-SCNC: 11 MMOL/L
AST SERPL-CCNC: 25 U/L
BASOPHILS # BLD AUTO: 0.07 K/UL
BASOPHILS NFR BLD AUTO: 1.2 %
BILIRUB SERPL-MCNC: 0.3 MG/DL
BUN SERPL-MCNC: 14 MG/DL
CALCIUM SERPL-MCNC: 9.8 MG/DL
CHLORIDE SERPL-SCNC: 101 MMOL/L
CHOLEST SERPL-MCNC: 198 MG/DL
CHOLEST/HDLC SERPL: 2.2 RATIO
CK MB BLD-MCNC: 2.1 NG/ML
CK SERPL-CCNC: 84 U/L
CO2 SERPL-SCNC: 25 MMOL/L
CREAT SERPL-MCNC: 0.72 MG/DL
EOSINOPHIL # BLD AUTO: 0.14 K/UL
EOSINOPHIL NFR BLD AUTO: 2.4 %
GLUCOSE SERPL-MCNC: 87 MG/DL
HCT VFR BLD CALC: 41.3 %
HDLC SERPL-MCNC: 92 MG/DL
HGB BLD-MCNC: 13.7 G/DL
IMM GRANULOCYTES NFR BLD AUTO: 0.3 %
LDLC SERPL CALC-MCNC: 90 MG/DL
LYMPHOCYTES # BLD AUTO: 1.82 K/UL
LYMPHOCYTES NFR BLD AUTO: 31.1 %
MAN DIFF?: NORMAL
MCHC RBC-ENTMCNC: 30.9 PG
MCHC RBC-ENTMCNC: 33.2 GM/DL
MCV RBC AUTO: 93.2 FL
MONOCYTES # BLD AUTO: 0.62 K/UL
MONOCYTES NFR BLD AUTO: 10.6 %
NEUTROPHILS # BLD AUTO: 3.18 K/UL
NEUTROPHILS NFR BLD AUTO: 54.4 %
PLATELET # BLD AUTO: 233 K/UL
POTASSIUM SERPL-SCNC: 5.1 MMOL/L
PROT SERPL-MCNC: 7.1 G/DL
RBC # BLD: 4.43 M/UL
RBC # FLD: 12.2 %
SODIUM SERPL-SCNC: 138 MMOL/L
TRIGL SERPL-MCNC: 83 MG/DL
TROPONIN I SERPL-MCNC: <0.01 NG/ML
TSH SERPL-ACNC: 1.92 UIU/ML
WBC # FLD AUTO: 5.85 K/UL

## 2020-04-08 PROCEDURE — 99214 OFFICE O/P EST MOD 30 MIN: CPT

## 2020-04-08 PROCEDURE — 93000 ELECTROCARDIOGRAM COMPLETE: CPT

## 2020-04-08 NOTE — HISTORY OF PRESENT ILLNESS
[FreeTextEntry1] : Mrs. 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 She has had low normal LV function since then.  She also has a history of bilateral carotid dissections that completely healed, as well as a recent diagnosis of FMD of the right renal artery, and Hashimoto's thyroiditis.  She had previously been on AC with Coumadin, but is currently only on aspirin therapy.  \par \par 9/19 the patient had a recurrent episode of chest pain, headache, and diaphoresis that was similar to the event in 2005. She went to the emergency room where her initial troponin was normal.\par The troponin level increased to 2.4 and she was sent for plain vitamin Connell for cardiac catheterization. This showed normal arteries with an ejection fraction of 55%. Echocardiogram showed ejection fraction of 45-50% with hypocontractility of the anterior wall and septum. A peak troponin T went up to 180. Was felt that she had a small myocardial infarction on the basis of spasm or blood clot. Her medications were not changed. In the hospital cholesterol was 178, triglycerides 34, HDL 88, and LDL 83 . She was placed on Plavix along with low-dose aspirin and losartan. Repeat echocardiogram performed 1/20 demonstrated a normal ejection fraction of 62% with mild MR.\par \par MRA scan of the neck 7/17 was normal. She had a CT angiogram of the heart 3/16 showed a calcium score 0 without any coronary disease.She had a recent carotid Doppler that according to the patient demonstrates mild plaque.\par \par Over the past week patient has intermittent atypical chest pain. She feels a knifelike pain that is very well localized left anterior chest without radiation. The chest pain comes and goes randomly. There is no soreness to the touch, and the chest pain is not pleuritic and not related to physical exertion.\par \par A resting 12-lead electrocardiogram demonstrates sinus rhythm. There is borderline nonspecific T-wave flattening which represents no change.\par

## 2020-04-08 NOTE — CARDIOLOGY SUMMARY
[No Ischemia] : no Ischemia [None] : no pulmonary hypertension [Normal] : normal [LVEF ___%] : LVEF [unfilled]% [Mild] : mild mitral regurgitation [___] : [unfilled]

## 2020-04-08 NOTE — DISCUSSION/SUMMARY
[FreeTextEntry1] : The patient has been under a lot of stress with the corona virus problem. She does work 2 jobs. The chest pain does not appear to be cardiac in origin. We did blood work to include cardiac enzymes.\par \par She has no evidence to suspect an acute viral infection. Specifically she has no cough, shortness of breath, or fever. She had some general body aches but those are gone. She has no loss of taste or smell and has no diarrhea.\par \par At the present time I would not recommend any specific treatment or further evaluation. I will await the results of the blood tests. I will call her with those results when available. If the symptoms progress or change she will call me.

## 2020-04-10 LAB — CARDIOLIPIN AB SER IA-ACNC: POSITIVE

## 2020-04-10 RX ORDER — LOSARTAN POTASSIUM 25 MG/1
25 TABLET, FILM COATED ORAL
Qty: 30 | Refills: 0 | Status: DISCONTINUED | COMMUNITY
Start: 2019-10-03 | End: 2020-04-10

## 2020-04-10 RX ORDER — CLOPIDOGREL BISULFATE 75 MG/1
75 TABLET, FILM COATED ORAL
Qty: 30 | Refills: 0 | Status: DISCONTINUED | COMMUNITY
Start: 2019-10-03 | End: 2020-04-10

## 2020-04-12 LAB
CARDIOLIPIN IGM SER-MCNC: 28.2 MPL
CARDIOLIPIN IGM SER-MCNC: <5 GPL

## 2020-04-13 ENCOUNTER — APPOINTMENT (OUTPATIENT)
Dept: OBGYN | Facility: CLINIC | Age: 55
End: 2020-04-13

## 2020-04-13 RX ORDER — LUBIPROSTONE 24 UG/1
24 CAPSULE, GELATIN COATED ORAL TWICE DAILY
Qty: 60 | Refills: 5 | Status: DISCONTINUED | COMMUNITY
Start: 2018-12-10 | End: 2020-04-13

## 2020-04-25 ENCOUNTER — MESSAGE (OUTPATIENT)
Age: 55
End: 2020-04-25

## 2020-04-28 RX ORDER — ASPIRIN 81 MG
81 TABLET, DELAYED RELEASE (ENTERIC COATED) ORAL
Refills: 0 | Status: DISCONTINUED | COMMUNITY
End: 2020-04-28

## 2020-05-05 LAB
SARS-COV-2 IGG SERPL IA-ACNC: 0.3 INDEX
SARS-COV-2 IGG SERPL QL IA: NEGATIVE

## 2020-05-12 ENCOUNTER — APPOINTMENT (OUTPATIENT)
Dept: CARDIOLOGY | Facility: CLINIC | Age: 55
End: 2020-05-12
Payer: COMMERCIAL

## 2020-05-12 DIAGNOSIS — I72.8 ANEURYSM OF OTHER SPECIFIED ARTERIES: ICD-10-CM

## 2020-05-12 PROCEDURE — 99441: CPT

## 2020-05-13 ENCOUNTER — APPOINTMENT (OUTPATIENT)
Dept: CARDIOLOGY | Facility: CLINIC | Age: 55
End: 2020-05-13

## 2020-05-20 ENCOUNTER — APPOINTMENT (OUTPATIENT)
Dept: GASTROENTEROLOGY | Facility: CLINIC | Age: 55
End: 2020-05-20

## 2020-05-26 ENCOUNTER — APPOINTMENT (OUTPATIENT)
Dept: CARDIOLOGY | Facility: CLINIC | Age: 55
End: 2020-05-26

## 2020-05-27 ENCOUNTER — APPOINTMENT (OUTPATIENT)
Dept: GASTROENTEROLOGY | Facility: CLINIC | Age: 55
End: 2020-05-27

## 2020-05-27 ENCOUNTER — APPOINTMENT (OUTPATIENT)
Dept: GASTROENTEROLOGY | Facility: CLINIC | Age: 55
End: 2020-05-27
Payer: COMMERCIAL

## 2020-05-27 PROCEDURE — 99442: CPT

## 2020-05-27 RX ORDER — LINACLOTIDE 290 UG/1
290 CAPSULE, GELATIN COATED ORAL
Qty: 90 | Refills: 3 | Status: DISCONTINUED | COMMUNITY
Start: 2018-12-05 | End: 2020-05-27

## 2020-05-27 RX ORDER — ESTRADIOL 10 UG/1
10 TABLET, FILM COATED VAGINAL
Qty: 16 | Refills: 0 | Status: DISCONTINUED | COMMUNITY
Start: 2017-10-25 | End: 2020-05-27

## 2020-05-27 RX ORDER — ASPIRIN 325 MG/1
325 TABLET, COATED ORAL
Refills: 0 | Status: DISCONTINUED | COMMUNITY
End: 2020-05-27

## 2020-05-27 RX ORDER — POLYETHYLENE GLYCOL 3350, SODIUM SULFATE ANHYDROUS, SODIUM BICARBONATE, SODIUM CHLORIDE, POTASSIUM CHLORIDE 227.1; 21.5; 6.36; 5.53; .754 G/L; G/L; G/L; G/L; G/L
227.1 POWDER, FOR SOLUTION ORAL
Qty: 1 | Refills: 0 | Status: DISCONTINUED | COMMUNITY
Start: 2018-12-21 | End: 2020-05-27

## 2020-05-27 NOTE — ASSESSMENT
[FreeTextEntry1] : This is A 54-year-old female with chronic constipation with good relief on Linzess 145 mcg daily. She is to continue on this dose. She will need surveillance colonoscopy within the next year. She wants to hold off on call when she is ready to schedule. She is to call me if she has any questions or concerns.\par \par I spent 15 minutes on the phone with patient with >50% counseling and coordination care for chronic constipation.

## 2020-05-27 NOTE — HISTORY OF PRESENT ILLNESS
[Home] : at home, [unfilled] , at the time of the visit. [Other Location: e.g. Home (Enter Location, City,State)___] : at [unfilled] [Verbal consent obtained from patient] : the patient, [unfilled] [FreeTextEntry1] : Patient presents for a telephonic visit. Verbal consent obtained. She is currently taking Linzess 145 mcg daily for constipation and feels much better. She was having diarrhea on the higher dose of 290 mcg. she denies abdominal pain, nausea or vomiting. She denies rectal bleeding or melena. Colonoscopy from May 2016 with a tubular adenoma. She needs surveillance colonoscopy. She was recently diagnosed with myocardial infarction in a fall. She wants to hold off colonoscopy for a few more months.

## 2020-07-02 ENCOUNTER — APPOINTMENT (OUTPATIENT)
Dept: NEUROLOGY | Facility: CLINIC | Age: 55
End: 2020-07-02
Payer: COMMERCIAL

## 2020-07-02 VITALS
BODY MASS INDEX: 19.81 KG/M2 | TEMPERATURE: 97.2 F | DIASTOLIC BLOOD PRESSURE: 76 MMHG | OXYGEN SATURATION: 98 % | SYSTOLIC BLOOD PRESSURE: 137 MMHG | HEIGHT: 64 IN | WEIGHT: 116 LBS | HEART RATE: 71 BPM

## 2020-07-02 VITALS — SYSTOLIC BLOOD PRESSURE: 121 MMHG | DIASTOLIC BLOOD PRESSURE: 83 MMHG

## 2020-07-02 DIAGNOSIS — I77.3 ARTERIAL FIBROMUSCULAR DYSPLASIA: ICD-10-CM

## 2020-07-02 PROCEDURE — 99205 OFFICE O/P NEW HI 60 MIN: CPT

## 2020-07-08 ENCOUNTER — LABORATORY RESULT (OUTPATIENT)
Age: 55
End: 2020-07-08

## 2020-07-13 ENCOUNTER — APPOINTMENT (OUTPATIENT)
Dept: CARDIOLOGY | Facility: CLINIC | Age: 55
End: 2020-07-13
Payer: COMMERCIAL

## 2020-07-13 VITALS — OXYGEN SATURATION: 100 % | HEART RATE: 67 BPM | DIASTOLIC BLOOD PRESSURE: 73 MMHG | SYSTOLIC BLOOD PRESSURE: 120 MMHG

## 2020-07-13 LAB
B2 GLYCOPROT1 AB SER QL: NEGATIVE
B2 GLYCOPROT1 IGA SERPL IA-ACNC: <5 SAU
B2 GLYCOPROT1 IGG SER-ACNC: <5 SGU
B2 GLYCOPROT1 IGM SER-ACNC: <5 SMU
CARDIOLIPIN AB SER IA-ACNC: POSITIVE
CARDIOLIPIN IGM SER-MCNC: 41.9 MPL
CARDIOLIPIN IGM SER-MCNC: 5 GPL
DEPRECATED CARDIOLIPIN IGA SER: <5 APL
ERYTHROCYTE [SEDIMENTATION RATE] IN BLOOD BY WESTERGREN METHOD: 3 MM/HR
LUPUS ANTICOAGULANT CASCADE REFLEX: NORMAL
PLATELET RESPONSE ASPIRIN: 585 ARU

## 2020-07-13 PROCEDURE — 99214 OFFICE O/P EST MOD 30 MIN: CPT

## 2020-07-13 NOTE — HISTORY OF PRESENT ILLNESS
[FreeTextEntry1] : Ms. 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  \par Prior genetic testing  testing limited to 12 genes (ACTA2, CBS, COL3A1,COL5A1, COL5A2, FBN1, FBN2, MYHII, DFM5N02, SMAD3, TGFBR1, TGFBR2), further microarray 6 genes (COL3A1, TGFB2, TBFBR2, ACTA2, SMAD3, TGFB2) for deletion/ duplication was negative but limited in the number of genes and the testing methods\par She  denies hx of hypermobile but states her daughter is hypermobile\par \par due to her hx and fh she today presents for a cardiogenomic eval\par \par

## 2020-07-13 NOTE — REASON FOR VISIT
[FreeTextEntry3] : BENJA OWENS  is being seen  for an initial consultation at the Cardiogenomics Program at [facility] on 07/13/2020.   Ms. OWENS was referred by  Dr Talbot  for hereditary cardiac predisposition risk assessment and counseling, due to her hx of marfanoid features, multiple arterial dissections and FH of AAA

## 2020-07-13 NOTE — REVIEW OF SYSTEMS
[Nl] : Neurological [NI] : Endocrine [Cyanosis] : no cyanosis [Edema] : no ~T edema [Diaphoresis] : no diaphoresis [Chest Pain] : no chest pain [Exercise Intolerance] : no exercise intolerance [Palpitations] : no palpitations

## 2020-07-13 NOTE — PHYSICAL EXAM
[] : Yes [Total Score ___] : Total Score = [unfilled] [de-identified] : thin F NAD [Normal] : normocephalic and atraumatic [de-identified] : + cigarette paper scars, + stria [de-identified] : wide spaced eyes [de-identified] : + pectus hx implant [de-identified] : high palate, single uvula [de-identified] : reg [de-identified] : sof nt, nd [de-identified] : thin , elongated fingers [de-identified] : see below, almost thumb sign [FreeTextEntry1] : 5 [FreeTextEntry2] : 163 [FreeTextEntry4] : 1.050 [FreeTextEntry3] : 172 [FreeTextEntry6] : 80 [FreeTextEntry5] : 83 [TWNoteComboBox1] : 3 [FreeTextEntry7] : 1.03 [TWNoteComboBox3] : 0 [TWNoteComboBox2] : 0 [TWNoteComboBox4] : 1 [TWNoteComboBox6] : 0 [TWNoteComboBox5] : 0 [TWNoteComboBox7] : 0 [de-identified] : 0 [de-identified] : 0 [de-identified] : 0 [de-identified] : 1 [de-identified] : 0 [de-identified] : 0 [de-identified] : 0 [Right] : Right: N [Left] : Left: N

## 2020-07-13 NOTE — FAMILY HISTORY
[FreeTextEntry1] : FamilyHistory_20_twCiteListControlStart FamilyHistory_20_twCiteListControlEnd [id number] \par A four-generation family history was constructed and scanned into [employer]. \par Family history is significant for: \par siblings: 3 brothers , 1 sister\par brother 60- hx DM ty 1 in 20s \par brother 58- hx HTN- 1 daughter, 2 sons\par brother 53- no med probs\par sister 50- thyroid prob, vericose veins, severe trigeminal neuralgia, FMD vertebral arteries- 2 children 1 son 12 yo, daughter 24yo hx lupus\par \par Mother dec 70s lung CA, hx afib, hx moncada neha, Hx COPD\par Maternal aunts: none\par Maternal uncles dec 80s hx CA\par Maternal Grandmother dec MI 70s\par Maternal Grandfather dec lymphoma\par \par Father: dec 86 yo hx AAA at age 55, hx ulcer, hx raynoids, hx Parkinsons\par Paternal aunts: x2 60s dec br ca, dec 60s ovarian ca\par Paternal uncles\par Paternal Grandfather  dec  80s cause unk\par Paternal Grandmother dec  stomach ca\par \par children:\par daughter age 24 + hypermobile joints, hx headaches\par son son 21 hx scoliosis, + pectus, hx asthma as an infant RSV now resolved\par  miscarage x3 @ 8 weeks\par hx molar pregnancy\par \par her maternal families originate from [place] and paternal families originate from [place] and [place]\par No Ashkenazi Quaker ancestry. \par Family history was negative for consanguinity  \par No family history of SIDS\par  \par \par

## 2020-07-14 ENCOUNTER — LABORATORY RESULT (OUTPATIENT)
Age: 55
End: 2020-07-14

## 2020-07-21 ENCOUNTER — OUTPATIENT (OUTPATIENT)
Dept: OUTPATIENT SERVICES | Facility: HOSPITAL | Age: 55
LOS: 1 days | End: 2020-07-21
Payer: COMMERCIAL

## 2020-07-21 ENCOUNTER — APPOINTMENT (OUTPATIENT)
Dept: MRI IMAGING | Facility: CLINIC | Age: 55
End: 2020-07-21
Payer: COMMERCIAL

## 2020-07-21 DIAGNOSIS — Z90.49 ACQUIRED ABSENCE OF OTHER SPECIFIED PARTS OF DIGESTIVE TRACT: Chronic | ICD-10-CM

## 2020-07-21 DIAGNOSIS — I77.3 ARTERIAL FIBROMUSCULAR DYSPLASIA: ICD-10-CM

## 2020-07-21 PROCEDURE — 70546 MR ANGIOGRAPH HEAD W/O&W/DYE: CPT | Mod: 26,76

## 2020-07-21 PROCEDURE — 70549 MR ANGIOGRAPH NECK W/O&W/DYE: CPT | Mod: 26,76

## 2020-07-21 PROCEDURE — 70546 MR ANGIOGRAPH HEAD W/O&W/DYE: CPT

## 2020-07-21 PROCEDURE — A9585: CPT

## 2020-07-21 PROCEDURE — 70549 MR ANGIOGRAPH NECK W/O&W/DYE: CPT

## 2020-07-23 LAB — PA ADP PRP-ACNC: 42 PRU

## 2020-08-02 LAB
ERYTHROCYTE [SEDIMENTATION RATE] IN BLOOD BY WESTERGREN METHOD: 3 MM/HR
ESTROGEN SERPL-MCNC: 135 PG/ML
PLATELET RESPONSE ASPIRIN: 549 ARU

## 2020-08-17 ENCOUNTER — APPOINTMENT (OUTPATIENT)
Dept: CARDIOLOGY | Facility: CLINIC | Age: 55
End: 2020-08-17
Payer: COMMERCIAL

## 2020-08-17 VITALS
SYSTOLIC BLOOD PRESSURE: 120 MMHG | HEIGHT: 64 IN | WEIGHT: 115 LBS | DIASTOLIC BLOOD PRESSURE: 70 MMHG | OXYGEN SATURATION: 100 % | BODY MASS INDEX: 19.63 KG/M2 | HEART RATE: 59 BPM

## 2020-08-17 PROCEDURE — 99215 OFFICE O/P EST HI 40 MIN: CPT

## 2020-08-17 NOTE — PHYSICAL EXAM
[Normal] : orientated to person, place, and time [Total Score ___] : Total Score = [unfilled] [] : Yes [de-identified] : thin F NAD [de-identified] : + cigarette paper scars, + stria [de-identified] : wide spaced eyes [de-identified] : high palate, single uvula [de-identified] : + pectus hx implant [de-identified] : reg [de-identified] : sof nt, nd [de-identified] : thin , elongated fingers [de-identified] : see below, almost thumb sign [FreeTextEntry1] : 5 [FreeTextEntry2] : 163 [FreeTextEntry3] : 172 [FreeTextEntry4] : 1.053 [FreeTextEntry5] : 83 [FreeTextEntry6] : 80 [FreeTextEntry7] : 1.03 [TWNoteComboBox1] : 3 [TWNoteComboBox2] : 0 [TWNoteComboBox3] : 0 [TWNoteComboBox5] : 0 [TWNoteComboBox4] : 1 [TWNoteComboBox6] : 0 [TWNoteComboBox7] : 0 [de-identified] : 0 [de-identified] : 0 [de-identified] : 0 [de-identified] : 1 [de-identified] : 0 [de-identified] : 0 [de-identified] : 0 [Right] : Right: N [Left] : Left: N

## 2020-08-17 NOTE — HISTORY OF PRESENT ILLNESS
[FreeTextEntry1] : Ms. 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  \par Prior genetic testing  testing limited to 12 genes (ACTA2, CBS, COL3A1,COL5A1, COL5A2, FBN1, FBN2, MYHII, DGY8N12, SMAD3, TGFBR1, TGFBR2), further microarray 6 genes (COL3A1, TGFB2, TBFBR2, ACTA2, SMAD3, TGFB2) for deletion/ duplication was negative but limited in the number of genes and the testing methods\par She  denies hx of hypermobile but states her daughter is hypermobile\par \par she underwent expanded genetic testing \par today she presents for results\par \par

## 2020-08-17 NOTE — FAMILY HISTORY
[FreeTextEntry1] : FamilyHistory_20_twCiteListControlStart FamilyHistory_20_twCiteListControlEnd [id number] \par A four-generation family history was constructed and scanned into [employer]. \par Family history is significant for: \par siblings: 3 brothers , 1 sister\par brother 60- hx DM ty 1 in 20s \par brother 58- hx HTN- 1 daughter, 2 sons\par brother 53- no med probs\par sister 50- thyroid prob, vericose veins, severe trigeminal neuralgia, FMD vertebral arteries- 2 children 1 son 10 yo, daughter 24yo hx lupus\par \par Mother dec 70s lung CA, hx afib, hx moncada neha, Hx COPD\par Maternal aunts: none\par Maternal uncles dec 80s hx CA\par Maternal Grandmother dec MI 70s\par Maternal Grandfather dec lymphoma\par \par Father: dec 84 yo hx AAA at age 55, hx ulcer, hx raynoids, hx Parkinsons\par Paternal aunts: x2 60s dec br ca, dec 60s ovarian ca\par Paternal uncles\par Paternal Grandfather  dec  80s cause unk\par Paternal Grandmother dec  stomach ca\par \par children:\par daughter age 24 + hypermobile joints, hx headaches\par son son 21 hx scoliosis, + pectus, hx asthma as an infant RSV now resolved\par  miscarage x3 @ 8 weeks\par hx molar pregnancy\par \par her maternal families originate from [place] and paternal families originate from [place] and [place]\par No Ashkenazi Evangelical ancestry. \par Family history was negative for consanguinity  \par No family history of SIDS\par  \par \par

## 2020-08-17 NOTE — REASON FOR VISIT
[FreeTextEntry3] : BENJA OWENS was initially seen  for an initial consultation at the Cardiogenomics Program at [facility] on 07/13/2020.   Ms. OWENS was referred by  Dr Talbot  for hereditary cardiac predisposition risk assessment and counseling, due to her hx of marfanoid features, multiple arterial dissections and FH of AAA

## 2020-09-01 ENCOUNTER — NON-APPOINTMENT (OUTPATIENT)
Age: 55
End: 2020-09-01

## 2020-09-10 ENCOUNTER — APPOINTMENT (OUTPATIENT)
Dept: ORTHOPEDIC SURGERY | Facility: CLINIC | Age: 55
End: 2020-09-10
Payer: COMMERCIAL

## 2020-09-10 VITALS
HEIGHT: 64 IN | OXYGEN SATURATION: 98 % | HEART RATE: 72 BPM | DIASTOLIC BLOOD PRESSURE: 72 MMHG | BODY MASS INDEX: 19.63 KG/M2 | SYSTOLIC BLOOD PRESSURE: 108 MMHG | WEIGHT: 115 LBS

## 2020-09-10 PROCEDURE — 73140 X-RAY EXAM OF FINGER(S): CPT | Mod: F9

## 2020-09-10 PROCEDURE — 99214 OFFICE O/P EST MOD 30 MIN: CPT | Mod: 57

## 2020-09-10 PROCEDURE — 26040 RELEASE PALM CONTRACTURE: CPT | Mod: F9

## 2020-10-05 ENCOUNTER — APPOINTMENT (OUTPATIENT)
Dept: UROLOGY | Facility: CLINIC | Age: 55
End: 2020-10-05
Payer: COMMERCIAL

## 2020-10-05 ENCOUNTER — OUTPATIENT (OUTPATIENT)
Dept: OUTPATIENT SERVICES | Facility: HOSPITAL | Age: 55
LOS: 1 days | End: 2020-10-05
Payer: COMMERCIAL

## 2020-10-05 VITALS — HEART RATE: 70 BPM | SYSTOLIC BLOOD PRESSURE: 125 MMHG | RESPIRATION RATE: 15 BRPM | DIASTOLIC BLOOD PRESSURE: 77 MMHG

## 2020-10-05 VITALS — TEMPERATURE: 97.3 F

## 2020-10-05 VITALS — RESPIRATION RATE: 15 BRPM | SYSTOLIC BLOOD PRESSURE: 110 MMHG | HEART RATE: 67 BPM | DIASTOLIC BLOOD PRESSURE: 73 MMHG

## 2020-10-05 DIAGNOSIS — Z90.49 ACQUIRED ABSENCE OF OTHER SPECIFIED PARTS OF DIGESTIVE TRACT: Chronic | ICD-10-CM

## 2020-10-05 DIAGNOSIS — R35.0 FREQUENCY OF MICTURITION: ICD-10-CM

## 2020-10-05 PROCEDURE — 64420 NJX AA&/STRD NTRCOST NRV 1: CPT

## 2020-10-05 PROCEDURE — 64430 NJX AA&/STRD PUDENDAL NERVE: CPT | Mod: 50

## 2020-10-08 DIAGNOSIS — M79.18 MYALGIA, OTHER SITE: ICD-10-CM

## 2020-10-19 ENCOUNTER — APPOINTMENT (OUTPATIENT)
Dept: ULTRASOUND IMAGING | Facility: CLINIC | Age: 55
End: 2020-10-19

## 2020-11-01 ENCOUNTER — TRANSCRIPTION ENCOUNTER (OUTPATIENT)
Age: 55
End: 2020-11-01

## 2020-11-03 ENCOUNTER — RESULT REVIEW (OUTPATIENT)
Age: 55
End: 2020-11-03

## 2020-11-03 ENCOUNTER — APPOINTMENT (OUTPATIENT)
Dept: ULTRASOUND IMAGING | Facility: IMAGING CENTER | Age: 55
End: 2020-11-03
Payer: COMMERCIAL

## 2020-11-03 ENCOUNTER — APPOINTMENT (OUTPATIENT)
Dept: MAMMOGRAPHY | Facility: IMAGING CENTER | Age: 55
End: 2020-11-03
Payer: COMMERCIAL

## 2020-11-03 ENCOUNTER — OUTPATIENT (OUTPATIENT)
Dept: OUTPATIENT SERVICES | Facility: HOSPITAL | Age: 55
LOS: 1 days | End: 2020-11-03
Payer: COMMERCIAL

## 2020-11-03 DIAGNOSIS — Z00.8 ENCOUNTER FOR OTHER GENERAL EXAMINATION: ICD-10-CM

## 2020-11-03 DIAGNOSIS — Z90.49 ACQUIRED ABSENCE OF OTHER SPECIFIED PARTS OF DIGESTIVE TRACT: Chronic | ICD-10-CM

## 2020-11-03 PROCEDURE — 76641 ULTRASOUND BREAST COMPLETE: CPT

## 2020-11-03 PROCEDURE — 76641 ULTRASOUND BREAST COMPLETE: CPT | Mod: 26,50

## 2020-11-07 ENCOUNTER — APPOINTMENT (OUTPATIENT)
Dept: MRI IMAGING | Facility: CLINIC | Age: 55
End: 2020-11-07

## 2020-11-15 ENCOUNTER — FORM ENCOUNTER (OUTPATIENT)
Age: 55
End: 2020-11-15

## 2020-11-18 ENCOUNTER — APPOINTMENT (OUTPATIENT)
Dept: CARDIOLOGY | Facility: CLINIC | Age: 55
End: 2020-11-18

## 2020-11-23 ENCOUNTER — APPOINTMENT (OUTPATIENT)
Dept: UROLOGY | Facility: CLINIC | Age: 55
End: 2020-11-23
Payer: COMMERCIAL

## 2020-11-23 ENCOUNTER — OUTPATIENT (OUTPATIENT)
Dept: OUTPATIENT SERVICES | Facility: HOSPITAL | Age: 55
LOS: 1 days | End: 2020-11-23
Payer: COMMERCIAL

## 2020-11-23 VITALS — HEART RATE: 72 BPM | RESPIRATION RATE: 16 BRPM | DIASTOLIC BLOOD PRESSURE: 69 MMHG | SYSTOLIC BLOOD PRESSURE: 114 MMHG

## 2020-11-23 DIAGNOSIS — Z90.49 ACQUIRED ABSENCE OF OTHER SPECIFIED PARTS OF DIGESTIVE TRACT: Chronic | ICD-10-CM

## 2020-11-23 DIAGNOSIS — R35.0 FREQUENCY OF MICTURITION: ICD-10-CM

## 2020-11-23 PROCEDURE — 20553 NJX 1/MLT TRIGGER POINTS 3/>: CPT

## 2020-11-23 PROCEDURE — 64430 NJX AA&/STRD PUDENDAL NERVE: CPT | Mod: 50,59

## 2020-11-23 PROCEDURE — 99213 OFFICE O/P EST LOW 20 MIN: CPT | Mod: 25

## 2020-11-28 ENCOUNTER — APPOINTMENT (OUTPATIENT)
Dept: ULTRASOUND IMAGING | Facility: IMAGING CENTER | Age: 55
End: 2020-11-28
Payer: COMMERCIAL

## 2020-11-28 ENCOUNTER — OUTPATIENT (OUTPATIENT)
Dept: OUTPATIENT SERVICES | Facility: HOSPITAL | Age: 55
LOS: 1 days | End: 2020-11-28
Payer: COMMERCIAL

## 2020-11-28 ENCOUNTER — RESULT REVIEW (OUTPATIENT)
Age: 55
End: 2020-11-28

## 2020-11-28 DIAGNOSIS — I72.8 ANEURYSM OF OTHER SPECIFIED ARTERIES: ICD-10-CM

## 2020-11-28 DIAGNOSIS — Z90.49 ACQUIRED ABSENCE OF OTHER SPECIFIED PARTS OF DIGESTIVE TRACT: Chronic | ICD-10-CM

## 2020-11-28 PROCEDURE — 93975 VASCULAR STUDY: CPT | Mod: 26

## 2020-11-28 PROCEDURE — 93975 VASCULAR STUDY: CPT

## 2020-12-02 DIAGNOSIS — I63.9 CEREBRAL INFARCTION, UNSPECIFIED: ICD-10-CM

## 2020-12-03 DIAGNOSIS — M79.18 MYALGIA, OTHER SITE: ICD-10-CM

## 2020-12-07 ENCOUNTER — LABORATORY RESULT (OUTPATIENT)
Age: 55
End: 2020-12-07

## 2020-12-14 ENCOUNTER — NON-APPOINTMENT (OUTPATIENT)
Age: 55
End: 2020-12-14

## 2020-12-14 ENCOUNTER — APPOINTMENT (OUTPATIENT)
Dept: CARDIOLOGY | Facility: CLINIC | Age: 55
End: 2020-12-14
Payer: COMMERCIAL

## 2020-12-14 VITALS — SYSTOLIC BLOOD PRESSURE: 99 MMHG | DIASTOLIC BLOOD PRESSURE: 80 MMHG

## 2020-12-14 VITALS — OXYGEN SATURATION: 98 % | HEIGHT: 64 IN | WEIGHT: 115 LBS | BODY MASS INDEX: 19.63 KG/M2 | HEART RATE: 58 BPM

## 2020-12-14 LAB — PA ADP PRP-ACNC: 120 PRU

## 2020-12-14 PROCEDURE — 99214 OFFICE O/P EST MOD 30 MIN: CPT

## 2020-12-14 PROCEDURE — 93000 ELECTROCARDIOGRAM COMPLETE: CPT

## 2020-12-14 PROCEDURE — 99072 ADDL SUPL MATRL&STAF TM PHE: CPT

## 2020-12-14 NOTE — CARDIOLOGY SUMMARY
[No Ischemia] : no Ischemia [LVEF ___%] : LVEF [unfilled]% [None] : no pulmonary hypertension [Mild] : mild mitral regurgitation [___] : [unfilled] [Normal] : normal [___] : [unfilled]

## 2020-12-14 NOTE — DISCUSSION/SUMMARY
[FreeTextEntry1] : the patient clinically is doing well without any signs or symptoms of active heart disease. On her medication she's had no further evidence of any acute vascular problems. Specifically she does have fibromuscular dysplasia and has had acute dissections of her carotid and coronary arteries.\par \par She will stay on her present medications. iIf she decides to have the surgical procedures done her cardiac status is stable and she represents a satisfactory candidate.\par \par Pending the above I would see the patient again in approximately 6 months if she does have any problems she will call me to

## 2020-12-14 NOTE — HISTORY OF PRESENT ILLNESS
[FreeTextEntry1] : Mrs. 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 She has had low normal LV function since then.  She also has a history of bilateral carotid dissections that completely healed, as well as a recent diagnosis of FMD of the right renal artery, and Hashimoto's thyroiditis.  She had previously been on AC with Coumadin, but is currently only on aspirin therapy.  \par \par 9/19 the patient had a recurrent episode of chest pain, headache, and diaphoresis that was similar to the event in 2005. She went to the emergency room where her initial troponin was normal.\par The troponin level increased to 2.4 and she was sent for plain vitamin Connell for cardiac catheterization. This showed normal arteries with an ejection fraction of 55%. Echocardiogram showed ejection fraction of 45-50% with hypocontractility of the anterior wall and septum. A peak troponin T went up to 180. Was felt that she had a small myocardial infarction on the basis of spasm or blood clot. Her medications were not changed. In the hospital cholesterol was 178, triglycerides 34, HDL 88, and LDL 83 . She was placed on Plavix along with low-dose aspirin and losartan. Repeat echocardiogram performed 1/20 demonstrated a normal ejection fraction of 62% with mild MR.\par \par MRA scan of the neck 7/17 was normal. She had a CT angiogram of the heart 3/16 showed a calcium score 0 without any coronary disease.She had a recent carotid Doppler that according to the patient demonstrates mild plaque. Repeat MRA of the neck performed 7/20 was unchanged. There was distal narrowing of the internal carotid arteries consistent with history of prior dissection.\par \par Blood were 4/20 demonstrated normal CBC and chemistries. Cholesterol 198, triglycerides 83, HDL 92, and LDL 90.\par \par A resting 12-lead electrocardiogram demonstrates sinus rhythm. There is borderline nonspecific T-wave flattening with poor R wave V1-V4 which represents no change.\par \par Patient has been feeling well. She has no chest pain, shortness of breath, palpitation. She is contemplating to surgical procedures. She is scheduled to have a minor facelift the end of this month and needs to have her breast implants exchanged which she plans to have done some time in the spring or summer. I did speak with Dr. Segura and he is okay with during the surgical procedures.

## 2020-12-15 ENCOUNTER — NON-APPOINTMENT (OUTPATIENT)
Age: 55
End: 2020-12-15

## 2020-12-18 ENCOUNTER — APPOINTMENT (OUTPATIENT)
Dept: CARDIOLOGY | Facility: CLINIC | Age: 55
End: 2020-12-18
Payer: COMMERCIAL

## 2020-12-18 VITALS — DIASTOLIC BLOOD PRESSURE: 71 MMHG | HEART RATE: 75 BPM | OXYGEN SATURATION: 100 % | SYSTOLIC BLOOD PRESSURE: 120 MMHG

## 2020-12-18 DIAGNOSIS — I77.9 DISORDER OF ARTERIES AND ARTERIOLES, UNSPECIFIED: ICD-10-CM

## 2020-12-18 LAB
ALBUMIN SERPL ELPH-MCNC: 4.8 G/DL
ALP BLD-CCNC: 64 U/L
ALT SERPL-CCNC: 17 U/L
ANION GAP SERPL CALC-SCNC: 13 MMOL/L
APPEARANCE: CLEAR
APTT BLD: 33.1 SEC
AST SERPL-CCNC: 25 U/L
BACTERIA: NEGATIVE
BASOPHILS # BLD AUTO: 0.07 K/UL
BASOPHILS NFR BLD AUTO: 1.1 %
BILIRUB SERPL-MCNC: 0.4 MG/DL
BILIRUBIN URINE: NEGATIVE
BLOOD URINE: NEGATIVE
BUN SERPL-MCNC: 9 MG/DL
CALCIUM SERPL-MCNC: 9.8 MG/DL
CHLORIDE SERPL-SCNC: 99 MMOL/L
CO2 SERPL-SCNC: 25 MMOL/L
COLOR: NORMAL
CREAT SERPL-MCNC: 0.89 MG/DL
EOSINOPHIL # BLD AUTO: 0.13 K/UL
EOSINOPHIL NFR BLD AUTO: 2 %
GLUCOSE QUALITATIVE U: NEGATIVE
GLUCOSE SERPL-MCNC: 84 MG/DL
HCT VFR BLD CALC: 38.7 %
HGB BLD-MCNC: 12.8 G/DL
HYALINE CASTS: 1 /LPF
IMM GRANULOCYTES NFR BLD AUTO: 0.2 %
INR PPP: 0.96 RATIO
KETONES URINE: NEGATIVE
LEUKOCYTE ESTERASE URINE: NEGATIVE
LYMPHOCYTES # BLD AUTO: 2.2 K/UL
LYMPHOCYTES NFR BLD AUTO: 33.3 %
MAN DIFF?: NORMAL
MCHC RBC-ENTMCNC: 31.1 PG
MCHC RBC-ENTMCNC: 33.1 GM/DL
MCV RBC AUTO: 93.9 FL
MICROSCOPIC-UA: NORMAL
MONOCYTES # BLD AUTO: 0.6 K/UL
MONOCYTES NFR BLD AUTO: 9.1 %
NEUTROPHILS # BLD AUTO: 3.6 K/UL
NEUTROPHILS NFR BLD AUTO: 54.3 %
NITRITE URINE: NEGATIVE
PH URINE: 6.5
PLATELET # BLD AUTO: 242 K/UL
POTASSIUM SERPL-SCNC: 4.4 MMOL/L
PROT SERPL-MCNC: 7.1 G/DL
PROTEIN URINE: NEGATIVE
PT BLD: 11.5 SEC
RBC # BLD: 4.12 M/UL
RBC # FLD: 12.3 %
RED BLOOD CELLS URINE: 0 /HPF
SODIUM SERPL-SCNC: 137 MMOL/L
SPECIFIC GRAVITY URINE: 1.01
SQUAMOUS EPITHELIAL CELLS: 1 /HPF
UROBILINOGEN URINE: NORMAL
WBC # FLD AUTO: 6.61 K/UL
WHITE BLOOD CELLS URINE: 0 /HPF

## 2020-12-18 PROCEDURE — 99214 OFFICE O/P EST MOD 30 MIN: CPT

## 2020-12-18 PROCEDURE — 99072 ADDL SUPL MATRL&STAF TM PHE: CPT

## 2020-12-20 LAB
CARDIOLIPIN AB SER IA-ACNC: POSITIVE
CARDIOLIPIN IGM SER-MCNC: <5 GPL

## 2020-12-21 NOTE — ASSESSMENT
[FreeTextEntry1] : Assessment:\par 1. SMA dissection -resolved\par 2. LV dysfunction - resolved\par     a. Echo from jan 2020 improved\par 3. 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 MRI/MRA 7/2020 : chronic but unchanged dissections in distal carotids.\par \par Plan:\par 1. lavix 75mg daily. \par 2. Continue current medication regimen\par 3.  Appreciate Dr. Miguel consultation\par 4.  Continue blood pressure and HR control\par 5.  Return in 6 months for followup\par

## 2020-12-21 NOTE — PHYSICAL EXAM
[General Appearance - Well Developed] : well developed [Normal Appearance] : normal appearance [Well Groomed] : well groomed [General Appearance - Well Nourished] : well nourished [No Deformities] : no deformities [General Appearance - In No Acute Distress] : no acute distress [Normal Conjunctiva] : the conjunctiva exhibited no abnormalities [Normal Oral Mucosa] : normal oral mucosa [Normal Jugular Venous V Waves Present] : normal jugular venous V waves present [Respiration, Rhythm And Depth] : normal respiratory rhythm and effort [Exaggerated Use Of Accessory Muscles For Inspiration] : no accessory muscle use [Auscultation Breath Sounds / Voice Sounds] : lungs were clear to auscultation bilaterally [Chest Palpation] : palpation of the chest revealed no abnormalities [Lungs Percussion] : the lungs were normal to percussion [Bowel Sounds] : normal bowel sounds [Abdomen Soft] : soft [Abdomen Tenderness] : non-tender [Abdomen Mass (___ Cm)] : no abdominal mass palpated [Abnormal Walk] : normal gait [Nail Clubbing] : no clubbing of the fingernails [Cyanosis, Localized] : no localized cyanosis [] : no rash [Oriented To Time, Place, And Person] : oriented to person, place, and time [No Anxiety] : not feeling anxious [FreeTextEntry1] : Palpable pulses distally

## 2020-12-21 NOTE — HISTORY OF PRESENT ILLNESS
[FreeTextEntry1] : BENJA OWENS is being seen for follow-up of a recent hospitalization for MI/FMD.\par Hx Carotid dissection, SMA dissection, prior MI's (SCAD)\par \par Wanted to go over results of mesenteric duplex, which showed resolution of dissection\par Had bloodwork with Dr. Turner, all within normal limits\par Saw Dr. Miguel who ruled out genetic connective disorders.\par She was to make sure she receives IVUS and OCT next time she has an MI and thus we discussed the utility and necessity of such therapies as only necessary in certain cases and may be even be harmful at times during acute SCAD\par Has elective surgery Dec 29th (mini-lift of face) (Dr. Muller stated okay for holding plavix 5 days)\par \par Denies any recent CP or SOB episodes. Denies any major abdominal changes.\par \par Medications:\par Plavix 75\par Losartan 25\par Verapamil 180mg\par Cetriline 50 \par Cymbalta 30\par Synthroid 88\par Conazepam 0.5\par Linzess

## 2020-12-23 LAB — CARDIOLIPIN IGM SER-MCNC: 31.3 MPL

## 2020-12-29 ENCOUNTER — OUTPATIENT (OUTPATIENT)
Dept: OUTPATIENT SERVICES | Facility: HOSPITAL | Age: 55
LOS: 1 days | End: 2020-12-29
Payer: COMMERCIAL

## 2020-12-29 ENCOUNTER — RESULT REVIEW (OUTPATIENT)
Age: 55
End: 2020-12-29

## 2020-12-29 ENCOUNTER — APPOINTMENT (OUTPATIENT)
Dept: ULTRASOUND IMAGING | Facility: CLINIC | Age: 55
End: 2020-12-29
Payer: COMMERCIAL

## 2020-12-29 DIAGNOSIS — Z90.49 ACQUIRED ABSENCE OF OTHER SPECIFIED PARTS OF DIGESTIVE TRACT: Chronic | ICD-10-CM

## 2020-12-29 DIAGNOSIS — Z00.8 ENCOUNTER FOR OTHER GENERAL EXAMINATION: ICD-10-CM

## 2020-12-29 PROCEDURE — 76830 TRANSVAGINAL US NON-OB: CPT | Mod: 26

## 2020-12-29 PROCEDURE — 76856 US EXAM PELVIC COMPLETE: CPT | Mod: 26

## 2020-12-29 PROCEDURE — 76856 US EXAM PELVIC COMPLETE: CPT

## 2020-12-29 PROCEDURE — 76830 TRANSVAGINAL US NON-OB: CPT

## 2021-01-08 ENCOUNTER — APPOINTMENT (OUTPATIENT)
Dept: MRI IMAGING | Facility: CLINIC | Age: 56
End: 2021-01-08
Payer: COMMERCIAL

## 2021-01-08 ENCOUNTER — RESULT REVIEW (OUTPATIENT)
Age: 56
End: 2021-01-08

## 2021-01-08 ENCOUNTER — OUTPATIENT (OUTPATIENT)
Dept: OUTPATIENT SERVICES | Facility: HOSPITAL | Age: 56
LOS: 1 days | End: 2021-01-08
Payer: COMMERCIAL

## 2021-01-08 DIAGNOSIS — Z90.49 ACQUIRED ABSENCE OF OTHER SPECIFIED PARTS OF DIGESTIVE TRACT: Chronic | ICD-10-CM

## 2021-01-08 DIAGNOSIS — Z98.82 BREAST IMPLANT STATUS: ICD-10-CM

## 2021-01-08 PROCEDURE — 77049 MRI BREAST C-+ W/CAD BI: CPT | Mod: 26

## 2021-01-08 PROCEDURE — C8908: CPT

## 2021-01-08 PROCEDURE — C8937: CPT

## 2021-01-13 ENCOUNTER — FORM ENCOUNTER (OUTPATIENT)
Age: 56
End: 2021-01-13

## 2021-01-13 ENCOUNTER — RESULT REVIEW (OUTPATIENT)
Age: 56
End: 2021-01-13

## 2021-01-14 ENCOUNTER — FORM ENCOUNTER (OUTPATIENT)
Age: 56
End: 2021-01-14

## 2021-01-14 ENCOUNTER — APPOINTMENT (OUTPATIENT)
Dept: OBGYN | Facility: CLINIC | Age: 56
End: 2021-01-14
Payer: COMMERCIAL

## 2021-01-14 PROCEDURE — 99072 ADDL SUPL MATRL&STAF TM PHE: CPT

## 2021-01-14 PROCEDURE — 99396 PREV VISIT EST AGE 40-64: CPT

## 2021-01-24 ENCOUNTER — FORM ENCOUNTER (OUTPATIENT)
Age: 56
End: 2021-01-24

## 2021-01-25 ENCOUNTER — APPOINTMENT (OUTPATIENT)
Dept: UROLOGY | Facility: CLINIC | Age: 56
End: 2021-01-25
Payer: COMMERCIAL

## 2021-01-25 ENCOUNTER — OUTPATIENT (OUTPATIENT)
Dept: OUTPATIENT SERVICES | Facility: HOSPITAL | Age: 56
LOS: 1 days | End: 2021-01-25
Payer: COMMERCIAL

## 2021-01-25 VITALS
TEMPERATURE: 97.7 F | WEIGHT: 115 LBS | SYSTOLIC BLOOD PRESSURE: 108 MMHG | HEIGHT: 64 IN | DIASTOLIC BLOOD PRESSURE: 73 MMHG | BODY MASS INDEX: 19.63 KG/M2 | HEART RATE: 99 BPM | RESPIRATION RATE: 18 BRPM

## 2021-01-25 DIAGNOSIS — Z90.49 ACQUIRED ABSENCE OF OTHER SPECIFIED PARTS OF DIGESTIVE TRACT: Chronic | ICD-10-CM

## 2021-01-25 DIAGNOSIS — R35.0 FREQUENCY OF MICTURITION: ICD-10-CM

## 2021-01-25 PROCEDURE — 20553 NJX 1/MLT TRIGGER POINTS 3/>: CPT

## 2021-01-25 PROCEDURE — 64430 NJX AA&/STRD PUDENDAL NERVE: CPT | Mod: 50,59

## 2021-01-26 NOTE — PROGRESS NOTE ADULT - PROVIDER SPECIALTY LIST ADULT
Cardiology Patient Education        Schizophrenia: Care Instructions  Your Care Instructions  Schizophrenia is a disease that makes it hard to think clearly, manage emotions, and interact with other people. It can cause:  · Delusions. These are beliefs that are not real.  · Hallucinations. These are things that you may see or hear that are not really there. · Paranoia. This is the belief that others are lying, cheating, using you, or trying to harm you. The disease may change your ability to enjoy life, express emotions, or function. At times, you may hear voices, behave strangely, have trouble speaking or understanding speech, or keep to yourself. The goal of treatment is to lower your stress and help your brain function normally. You may need lifelong treatment with medicines and counseling to keep your schizophrenia under control. When schizophrenia is not treated, the risks are higher for suicide, a hospital stay, and other problems. Early treatment called coordinated specialty care [facility]) may help a person who is having his or her first episode of psychotic thoughts. Ask your doctor about [address]. Follow-up care is a key part of your treatment and safety. Be sure to make and go to all appointments, and call your doctor if you are having problems. It's also a good idea to know your test results and keep a list of the medicines you take. How can you care for yourself at home? · Be safe with medicines. Take your medicines exactly as prescribed. Call your doctor if you think you are having a problem with your medicine. When you feel good, you may think that you do not need your medicines. But it is important to keep taking them as scheduled. · Go to your counseling sessions. Call and talk with your counselor if you can't attend or if you don't think the sessions are helping. Do not just stop going. · Eat a healthy diet. Talk with a dietitian about what type of diet may be best for you.   · Do not use alcohol or illegal drugs.  · Keep the numbers for these national suicide hotlines: 2-110-830-TALK (5-971.642.6773) and 7-029-OODVXZZ (1-500.534.6764). If you or someone you know talks about suicide or feeling hopeless, get help right away. What should you do if someone in your family has schizophrenia? · Learn about the disease and how it may get worse over time. · Remind your family member that you love him or her. · Make a plan with all family members about how to take care of your loved one when his or her symptoms are bad. · Talk about your fears and concerns and those of other family members. Seek counseling if needed. · Do not focus attention only on the person who is in treatment. · Remind yourself that it will take time for changes to occur. · Do not blame yourself for the disease. · Know your legal rights and the legal rights of your family member. · Take care of yourself. Stay involved with your own interests, such as your career, hobbies, and friends. Use exercise, positive self-talk, relaxation, and deep breathing to help lower your stress. · Give yourself time to grieve. You may need to deal with emotions such as anger, fear, and frustration. After you work through your feelings, you will be better able to care for yourself and your family. · If you are having a hard time with your feelings and your interactions with your family member, talk with a counselor. When should you call for help? Call 911 anytime you think you may need emergency care. For example, call if:    · You are thinking about suicide or are threatening suicide.     · You feel like hurting yourself or someone else. Call your doctor now or seek immediate medical care if:    · You hear voices.     · You think someone is trying to harm you.     · You cannot concentrate or are easily confused.    Watch closely for changes in your health, and be sure to contact your doctor if:    · You are having trouble taking care of yourself.     · You cannot attend your counseling sessions. Where can you learn more? Go to http://www.gray.com/  Enter B628 in the search box to learn more about \"Schizophrenia: Care Instructions. \"  Current as of: 2020               Content Version: 12.6  © 9013-6211 [employer]. Care instructions adapted under license by [employer] (which disclaims liability or warranty for this information). If you have questions about a medical condition or this instruction, always ask your healthcare professional. [place] [address] any warranty or liability for your use of this information. DISCHARGE SUMMARY    Angelito Simmons  : 1996  MRN: [id number]    The patient Malcom Mtz exhibits the ability to control behavior in a less restrictive environment. Patient's level of functioning is improving. No assaultive/destructive behavior has been observed for the past 24 hours. No suicidal/homicidal threat or behavior has been observed for the past 24 hours. There is no evidence of serious medication side effects. Patient has not been in physical or protective restraints for at least the past 24 hours. If weapons involved, how are they secured? No weapons    Is patient aware of and in agreement with discharge plan? Patient agrees to discharge to step-down crisis stabilization until 21 when he can move into [facility]. Arrangements for medication:  Prescriptions given to patient    Copy of discharge instructions to provider?:  Fax to Royce Lay at [phone] or [phone]    Arrangements for transportation home:  Crisis stabilization to transport    Keep all follow up appointments as scheduled, continue to take prescribed medications per physician instructions. Mental health crisis number:  [phone] or your local mental health crisis line number at [phone].       Mental Health Emergency WARM LINE      [phone]      M-F: 9am to 9pm Sat & Sun: 5pm - 9pm  National suicide prevention lines:                             5-723-ULPQCNG (6-103.532.8602)       1-378-963-TALK (0-243.565.1645)   24/7 Crisis Text Line:  Text HOME to [phone]  If I feel I am at risk of hurting myself or others, I will call the crisis office and speak with a crisis worker who will assist me during my crisis. Guevara Ravi .Carole Gentleman -  [address] [phone]  [address] [place] [address]-  [phone]

## 2021-01-28 ENCOUNTER — APPOINTMENT (OUTPATIENT)
Dept: CARDIOLOGY | Facility: CLINIC | Age: 56
End: 2021-01-28
Payer: COMMERCIAL

## 2021-01-28 NOTE — CARDIOLOGY SUMMARY
[No Ischemia] : no Ischemia [LVEF ___%] : LVEF [unfilled]% [None] : no pulmonary hypertension [___] : [unfilled] [Mild] : mild mitral regurgitation [Normal] : normal

## 2021-01-28 NOTE — PHYSICAL EXAM
[Normal Appearance] : normal appearance [Well Groomed] : well groomed [General Appearance - In No Acute Distress] : no acute distress [Normal Conjunctiva] : the conjunctiva exhibited no abnormalities [Eyelids - No Xanthelasma] : the eyelids demonstrated no xanthelasmas [Normal Oral Mucosa] : normal oral mucosa [No Oral Pallor] : no oral pallor [No Oral Cyanosis] : no oral cyanosis [Normal Oropharynx] : normal oropharynx [Normal Jugular Venous A Waves Present] : normal jugular venous A waves present [Normal Jugular Venous V Waves Present] : normal jugular venous V waves present [No Jugular Venous Cárdenas A Waves] : no jugular venous cárdenas A waves [Respiration, Rhythm And Depth] : normal respiratory rhythm and effort [Exaggerated Use Of Accessory Muscles For Inspiration] : no accessory muscle use [Auscultation Breath Sounds / Voice Sounds] : lungs were clear to auscultation bilaterally [Bowel Sounds] : normal bowel sounds [Abdomen Soft] : soft [Abdomen Tenderness] : non-tender [Abdomen Mass (___ Cm)] : no abdominal mass palpated [Abnormal Walk] : normal gait [Gait - Sufficient For Exercise Testing] : the gait was sufficient for exercise testing [Nail Clubbing] : no clubbing of the fingernails [Cyanosis, Localized] : no localized cyanosis [Petechial Hemorrhages (___cm)] : no petechial hemorrhages [Skin Color & Pigmentation] : normal skin color and pigmentation [] : no rash [No Venous Stasis] : no venous stasis [Skin Lesions] : no skin lesions [Oriented To Time, Place, And Person] : oriented to person, place, and time [Affect] : the affect was normal [Mood] : the mood was normal [No Anxiety] : not feeling anxious [5th Left ICS - MCL] : palpated at the 5th LICS in the midclavicular line [Normal] : normal [No Precordial Heave] : no precordial heave was noted [Normal Rate] : normal [Rhythm Regular] : regular [Normal S1] : normal S1 [Normal S2] : normal S2 [No Gallop] : no gallop heard [I] : a grade 1 [2+] : right 2+ [No Abnormalities] : the abdominal aorta was not enlarged and no bruit was heard [No Pitting Edema] : no pitting edema present [FreeTextEntry1] : No abdominal bruits [S3] : no S3 [S4] : no S4 [Left Carotid Bruit] : no bruit heard over the left carotid [Right Carotid Bruit] : no bruit heard over the right carotid

## 2021-01-29 DIAGNOSIS — M79.18 MYALGIA, OTHER SITE: ICD-10-CM

## 2021-02-12 ENCOUNTER — OUTPATIENT (OUTPATIENT)
Dept: OUTPATIENT SERVICES | Facility: HOSPITAL | Age: 56
LOS: 1 days | End: 2021-02-12
Payer: COMMERCIAL

## 2021-02-12 DIAGNOSIS — Z20.828 CONTACT WITH AND (SUSPECTED) EXPOSURE TO OTHER VIRAL COMMUNICABLE DISEASES: ICD-10-CM

## 2021-02-12 DIAGNOSIS — Z90.49 ACQUIRED ABSENCE OF OTHER SPECIFIED PARTS OF DIGESTIVE TRACT: Chronic | ICD-10-CM

## 2021-02-12 LAB — SARS-COV-2 RNA SPEC QL NAA+PROBE: SIGNIFICANT CHANGE UP

## 2021-02-12 PROCEDURE — U0003: CPT

## 2021-02-12 PROCEDURE — U0005: CPT

## 2021-03-04 ENCOUNTER — RESULT REVIEW (OUTPATIENT)
Age: 56
End: 2021-03-04

## 2021-03-04 ENCOUNTER — OUTPATIENT (OUTPATIENT)
Dept: OUTPATIENT SERVICES | Facility: HOSPITAL | Age: 56
LOS: 1 days | End: 2021-03-04
Payer: COMMERCIAL

## 2021-03-04 ENCOUNTER — APPOINTMENT (OUTPATIENT)
Dept: MAMMOGRAPHY | Facility: CLINIC | Age: 56
End: 2021-03-04
Payer: COMMERCIAL

## 2021-03-04 ENCOUNTER — APPOINTMENT (OUTPATIENT)
Dept: ULTRASOUND IMAGING | Facility: CLINIC | Age: 56
End: 2021-03-04
Payer: COMMERCIAL

## 2021-03-04 DIAGNOSIS — Z00.8 ENCOUNTER FOR OTHER GENERAL EXAMINATION: ICD-10-CM

## 2021-03-04 DIAGNOSIS — Z90.49 ACQUIRED ABSENCE OF OTHER SPECIFIED PARTS OF DIGESTIVE TRACT: Chronic | ICD-10-CM

## 2021-03-04 PROCEDURE — G0279: CPT | Mod: 26

## 2021-03-04 PROCEDURE — 76641 ULTRASOUND BREAST COMPLETE: CPT | Mod: 26,50

## 2021-03-04 PROCEDURE — 77066 DX MAMMO INCL CAD BI: CPT | Mod: 26

## 2021-03-04 PROCEDURE — 76641 ULTRASOUND BREAST COMPLETE: CPT

## 2021-03-04 PROCEDURE — 77066 DX MAMMO INCL CAD BI: CPT

## 2021-03-04 PROCEDURE — G0279: CPT

## 2021-03-05 ENCOUNTER — RX RENEWAL (OUTPATIENT)
Age: 56
End: 2021-03-05

## 2021-03-10 ENCOUNTER — APPOINTMENT (OUTPATIENT)
Dept: CARDIOLOGY | Facility: CLINIC | Age: 56
End: 2021-03-10
Payer: COMMERCIAL

## 2021-03-10 ENCOUNTER — NON-APPOINTMENT (OUTPATIENT)
Age: 56
End: 2021-03-10

## 2021-03-10 VITALS
DIASTOLIC BLOOD PRESSURE: 73 MMHG | WEIGHT: 115 LBS | TEMPERATURE: 97 F | HEIGHT: 64 IN | SYSTOLIC BLOOD PRESSURE: 124 MMHG | HEART RATE: 70 BPM | OXYGEN SATURATION: 98 % | BODY MASS INDEX: 19.63 KG/M2

## 2021-03-10 PROCEDURE — 99214 OFFICE O/P EST MOD 30 MIN: CPT

## 2021-03-10 PROCEDURE — 99072 ADDL SUPL MATRL&STAF TM PHE: CPT

## 2021-03-11 ENCOUNTER — FORM ENCOUNTER (OUTPATIENT)
Age: 56
End: 2021-03-11

## 2021-03-11 ENCOUNTER — APPOINTMENT (OUTPATIENT)
Dept: CARDIOLOGY | Facility: CLINIC | Age: 56
End: 2021-03-11
Payer: COMMERCIAL

## 2021-03-11 NOTE — ASSESSMENT
[FreeTextEntry1] : Assessment:\par 1. SMA dissection -resolved\par 2. LV dysfunction - resolved\par     a. Echo from jan 2020 improved\par 3. 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 MRI/MRA 7/2020 : chronic but unchanged dissections in distal carotids.\par 4.  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  Ok to stop plavix 75mg 5 days prior to facelift, give aspirin 81mg daily while off plavix, then resume plavix post op when ok with surgeon.  \par 2.  DO NOT HYPEREXTEND THE NECK during the procedure.   \par 3.   Continue current medication regimen\par 4.  Continue blood pressure and HR control, even on the day of the surgery.  \par 5.  Return in 3 months.\par \par \par Fax note to  Dr. Jose F Segura [phone] \par

## 2021-03-11 NOTE — PHYSICAL EXAM
[General Appearance - Well Developed] : well developed [Normal Appearance] : normal appearance [Well Groomed] : well groomed [General Appearance - Well Nourished] : well nourished [No Deformities] : no deformities [General Appearance - In No Acute Distress] : no acute distress [Normal Conjunctiva] : the conjunctiva exhibited no abnormalities [Normal Oral Mucosa] : normal oral mucosa [Normal Jugular Venous V Waves Present] : normal jugular venous V waves present [Respiration, Rhythm And Depth] : normal respiratory rhythm and effort [Exaggerated Use Of Accessory Muscles For Inspiration] : no accessory muscle use [Auscultation Breath Sounds / Voice Sounds] : lungs were clear to auscultation bilaterally [Chest Palpation] : palpation of the chest revealed no abnormalities [Lungs Percussion] : the lungs were normal to percussion [Heart Rate And Rhythm] : heart rate and rhythm were normal [Heart Sounds] : normal S1 and S2 [Murmurs] : no murmurs present [Bowel Sounds] : normal bowel sounds [Abdomen Soft] : soft [Abdomen Tenderness] : non-tender [Abdomen Mass (___ Cm)] : no abdominal mass palpated [Abnormal Walk] : normal gait [Nail Clubbing] : no clubbing of the fingernails [Cyanosis, Localized] : no localized cyanosis [] : no rash [Oriented To Time, Place, And Person] : oriented to person, place, and time [No Anxiety] : not feeling anxious [FreeTextEntry1] : Palpable pulses distally

## 2021-03-11 NOTE — HISTORY OF PRESENT ILLNESS
[FreeTextEntry1] : 3/10/2021\par \par Has elective surgery March 23rd (mini-lift of face) with Dr. Jose F Segura [phone]\par Denies any recent CP or SOB episodes. Denies any major abdominal changes.\par Seeing Dr. Turner her cardiologist tomorrow. \par Able to walk from [facility] to my office , up a steep hill without any issues.\par She is exercising 3x per week.\par \par Abdomen feels well.  \par \par Needs to hold Plavix for 5 days prior to surgery.  \par She will take Plavix last day March 18th.  Start aspirin 81mg daily while off plavix.  \par \par \par Medications:\par Plavix 75\par Losartan 25\par Verapamil 180mg\par Cetriline 50 \par Cymbalta 30\par Synthroid 88\par Xanax 0.5\par Linzess\par Lunesta

## 2021-03-16 ENCOUNTER — NON-APPOINTMENT (OUTPATIENT)
Age: 56
End: 2021-03-16

## 2021-03-16 ENCOUNTER — APPOINTMENT (OUTPATIENT)
Dept: CARDIOLOGY | Facility: CLINIC | Age: 56
End: 2021-03-16
Payer: COMMERCIAL

## 2021-03-16 VITALS — SYSTOLIC BLOOD PRESSURE: 112 MMHG | DIASTOLIC BLOOD PRESSURE: 72 MMHG | HEART RATE: 67 BPM | OXYGEN SATURATION: 100 %

## 2021-03-16 VITALS — HEIGHT: 64 IN | WEIGHT: 116 LBS | BODY MASS INDEX: 19.81 KG/M2

## 2021-03-16 PROCEDURE — 99214 OFFICE O/P EST MOD 30 MIN: CPT

## 2021-03-16 PROCEDURE — 93000 ELECTROCARDIOGRAM COMPLETE: CPT

## 2021-03-16 PROCEDURE — 99072 ADDL SUPL MATRL&STAF TM PHE: CPT

## 2021-03-16 NOTE — DISCUSSION/SUMMARY
[FreeTextEntry1] : The patient's cardiac status is stable without any signs or symptoms of active heart disease.  She remains physically active and has no chest pain, shortness of breath, palpitation. On the Plavix she clinically has had no further episodes of arterial dissection. Blood work has been stable\par \par The patient represents a satisfactory candidate for the planned plastic surgery. No special precautions other than routine hemodynamic monitoring should be required. She'll have presurgical testing performed.

## 2021-03-16 NOTE — REVIEW OF SYSTEMS
[Shortness Of Breath] : no shortness of breath [Chest Pain] : no chest pain [Lower Ext Edema] : no extremity edema [Palpitations] : no palpitations [Dysuria] : no dysuria [Incontinence] : no incontinence

## 2021-03-16 NOTE — HISTORY OF PRESENT ILLNESS
[FreeTextEntry1] : Mrs. 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 She has had low normal LV function since then.  She also has a history of bilateral carotid dissections that completely healed, as well as a recent diagnosis of FMD of the right renal artery, and Hashimoto's thyroiditis.  She had previously been on AC with Coumadin, but is currently only on aspirin therapy.  \par \par 9/19 the patient had a recurrent episode of chest pain, headache, and diaphoresis that was similar to the event in 2005. She went to the emergency room where her initial troponin was normal.\par The troponin level increased to 2.4 and she was sent for plain vitamin Connell for cardiac catheterization. This showed normal arteries with an ejection fraction of 55%. Echocardiogram showed ejection fraction of 45-50% with hypocontractility of the anterior wall and septum. A peak troponin T went up to 180. Was felt that she had a small myocardial infarction on the basis of spasm or blood clot. Her medications were not changed. In the hospital cholesterol was 178, triglycerides 34, HDL 88, and LDL 83 . She was placed on Plavix along with low-dose aspirin and losartan. Repeat echocardiogram performed 1/20 demonstrated a normal ejection fraction of 62% with mild MR.\par \par MRA scan of the neck 7/17 was normal. She had a CT angiogram of the heart 3/16 showed a calcium score 0 without any coronary disease.She had a recent carotid Doppler that according to the patient demonstrates mild plaque. Repeat MRA of the neck performed 7/20 was unchanged. There was distal narrowing of the internal carotid arteries consistent with history of prior dissection.\par \par Blood were 4/20 demonstrated normal CBC and chemistries. Cholesterol 198, triglycerides 83, HDL 92, and LDL 90. 12/20 the patient had normal CBC, chemistries, PT, and PTT. Positive Cardiolipin antibody.\par \par A resting 12-lead electrocardiogram demonstrates sinus rhythm. There is borderline nonspecific T-wave flattening with poor R wave V1-V4 which represents no change.\par \par Patient has been feeling well. She has no chest pain, shortness of breath, palpitation.  She is scheduled to have a minor facelift the end of this month and needs to have her breast implants exchanged which she plans to have done some time in the spring or summer. I did speak with Dr. Segura and he is okay with during the surgical procedures.\par \par ECG demonstrates sinus rhythm. There is poor R. progression in the anteroseptal leads with RSR pattern and left atrial enlargement. This represents no change.

## 2021-03-16 NOTE — PHYSICAL EXAM
[FreeTextEntry1] : No abdominal bruits [S3] : no S3 [S4] : no S4 [Right Carotid Bruit] : no bruit heard over the right carotid [Left Carotid Bruit] : no bruit heard over the left carotid

## 2021-03-18 LAB
ALBUMIN SERPL ELPH-MCNC: 5.2 G/DL
ALP BLD-CCNC: 73 U/L
ALT SERPL-CCNC: 14 U/L
ANION GAP SERPL CALC-SCNC: 14 MMOL/L
APPEARANCE: CLEAR
APTT BLD: 33.5 SEC
AST SERPL-CCNC: 29 U/L
BACTERIA: NEGATIVE
BASOPHILS # BLD AUTO: 0.07 K/UL
BASOPHILS NFR BLD AUTO: 1 %
BILIRUB SERPL-MCNC: 0.2 MG/DL
BILIRUBIN URINE: NEGATIVE
BLOOD URINE: NEGATIVE
BUN SERPL-MCNC: 15 MG/DL
CALCIUM SERPL-MCNC: 10.2 MG/DL
CARDIOLIPIN IGM SER-MCNC: <5 GPL
CHLORIDE SERPL-SCNC: 104 MMOL/L
CO2 SERPL-SCNC: 22 MMOL/L
COLOR: NORMAL
CREAT SERPL-MCNC: 0.73 MG/DL
DEPRECATED CARDIOLIPIN IGA SER: <5 APL
EOSINOPHIL # BLD AUTO: 0.3 K/UL
EOSINOPHIL NFR BLD AUTO: 4.2 %
GLUCOSE QUALITATIVE U: NEGATIVE
GLUCOSE SERPL-MCNC: 94 MG/DL
HCT VFR BLD CALC: 44.7 %
HGB BLD-MCNC: 14.8 G/DL
HYALINE CASTS: 1 /LPF
IMM GRANULOCYTES NFR BLD AUTO: 0.1 %
INR PPP: 0.91 RATIO
KETONES URINE: NEGATIVE
LEUKOCYTE ESTERASE URINE: NEGATIVE
LYMPHOCYTES # BLD AUTO: 1.83 K/UL
LYMPHOCYTES NFR BLD AUTO: 25.8 %
MAN DIFF?: NORMAL
MCHC RBC-ENTMCNC: 31.2 PG
MCHC RBC-ENTMCNC: 33.1 GM/DL
MCV RBC AUTO: 94.3 FL
MICROSCOPIC-UA: NORMAL
MONOCYTES # BLD AUTO: 0.78 K/UL
MONOCYTES NFR BLD AUTO: 11 %
NEUTROPHILS # BLD AUTO: 4.1 K/UL
NEUTROPHILS NFR BLD AUTO: 57.9 %
NITRITE URINE: NEGATIVE
PH URINE: 5
PLATELET # BLD AUTO: 263 K/UL
POTASSIUM SERPL-SCNC: 4.8 MMOL/L
PROT SERPL-MCNC: 7.9 G/DL
PROTEIN URINE: NEGATIVE
PT BLD: 10.9 SEC
RBC # BLD: 4.74 M/UL
RBC # FLD: 12.1 %
RED BLOOD CELLS URINE: 9 /HPF
SODIUM SERPL-SCNC: 140 MMOL/L
SPECIFIC GRAVITY URINE: 1.01
SQUAMOUS EPITHELIAL CELLS: 1 /HPF
UROBILINOGEN URINE: NORMAL
WBC # FLD AUTO: 7.09 K/UL
WHITE BLOOD CELLS URINE: 1 /HPF

## 2021-03-19 LAB — CARDIOLIPIN IGM SER-MCNC: 49.9 MPL

## 2021-03-21 LAB — CARDIOLIPIN AB SER IA-ACNC: POSITIVE

## 2021-03-23 ENCOUNTER — FORM ENCOUNTER (OUTPATIENT)
Age: 56
End: 2021-03-23

## 2021-04-01 ENCOUNTER — NON-APPOINTMENT (OUTPATIENT)
Age: 56
End: 2021-04-01

## 2021-04-07 NOTE — PHYSICAL EXAM
[Normal Appearance] : normal appearance [Well Groomed] : well groomed [General Appearance - In No Acute Distress] : no acute distress [Normal Conjunctiva] : the conjunctiva exhibited no abnormalities [Eyelids - No Xanthelasma] : the eyelids demonstrated no xanthelasmas [Normal Oral Mucosa] : normal oral mucosa [No Oral Pallor] : no oral pallor [No Oral Cyanosis] : no oral cyanosis [Normal Oropharynx] : normal oropharynx [Normal Jugular Venous A Waves Present] : normal jugular venous A waves present [Normal Jugular Venous V Waves Present] : normal jugular venous V waves present [No Jugular Venous Cárdenas A Waves] : no jugular venous cárdenas A waves [Respiration, Rhythm And Depth] : normal respiratory rhythm and effort [Exaggerated Use Of Accessory Muscles For Inspiration] : no accessory muscle use [Auscultation Breath Sounds / Voice Sounds] : lungs were clear to auscultation bilaterally [Bowel Sounds] : normal bowel sounds [Abdomen Soft] : soft [Abdomen Tenderness] : non-tender [Abdomen Mass (___ Cm)] : no abdominal mass palpated [Abnormal Walk] : normal gait [Gait - Sufficient For Exercise Testing] : the gait was sufficient for exercise testing [Nail Clubbing] : no clubbing of the fingernails [Cyanosis, Localized] : no localized cyanosis [Petechial Hemorrhages (___cm)] : no petechial hemorrhages [Skin Color & Pigmentation] : normal skin color and pigmentation [] : no rash [No Venous Stasis] : no venous stasis [Skin Lesions] : no skin lesions [Oriented To Time, Place, And Person] : oriented to person, place, and time [Affect] : the affect was normal [Mood] : the mood was normal [No Anxiety] : not feeling anxious [5th Left ICS - MCL] : palpated at the 5th LICS in the midclavicular line [Normal] : normal [No Precordial Heave] : no precordial heave was noted [Normal Rate] : normal [Rhythm Regular] : regular [Normal S1] : normal S1 [Normal S2] : normal S2 [No Gallop] : no gallop heard [I] : a grade 1 [2+] : left 2+ [No Abnormalities] : the abdominal aorta was not enlarged and no bruit was heard [No Pitting Edema] : no pitting edema present [FreeTextEntry1] : No abdominal bruits [S3] : no S3 [S4] : no S4 [Right Carotid Bruit] : no bruit heard over the right carotid [Left Carotid Bruit] : no bruit heard over the left carotid Grossly Intact

## 2021-04-16 ENCOUNTER — RX RENEWAL (OUTPATIENT)
Age: 56
End: 2021-04-16

## 2021-04-27 ENCOUNTER — FORM ENCOUNTER (OUTPATIENT)
Age: 56
End: 2021-04-27

## 2021-05-11 ENCOUNTER — TRANSCRIPTION ENCOUNTER (OUTPATIENT)
Age: 56
End: 2021-05-11

## 2021-05-17 ENCOUNTER — FORM ENCOUNTER (OUTPATIENT)
Age: 56
End: 2021-05-17

## 2021-06-14 LAB — TROPONIN-T, HIGH SENSITIVITY: <6 NG/L

## 2021-06-24 ENCOUNTER — OUTPATIENT (OUTPATIENT)
Dept: OUTPATIENT SERVICES | Facility: HOSPITAL | Age: 56
LOS: 1 days | End: 2021-06-24
Payer: COMMERCIAL

## 2021-06-24 ENCOUNTER — APPOINTMENT (OUTPATIENT)
Dept: UROLOGY | Facility: CLINIC | Age: 56
End: 2021-06-24
Payer: COMMERCIAL

## 2021-06-24 ENCOUNTER — RESULT REVIEW (OUTPATIENT)
Age: 56
End: 2021-06-24

## 2021-06-24 VITALS
WEIGHT: 116 LBS | BODY MASS INDEX: 19.81 KG/M2 | HEIGHT: 64 IN | HEART RATE: 64 BPM | RESPIRATION RATE: 18 BRPM | DIASTOLIC BLOOD PRESSURE: 73 MMHG | SYSTOLIC BLOOD PRESSURE: 124 MMHG

## 2021-06-24 DIAGNOSIS — R35.0 FREQUENCY OF MICTURITION: ICD-10-CM

## 2021-06-24 DIAGNOSIS — M79.18 MYALGIA, OTHER SITE: ICD-10-CM

## 2021-06-24 DIAGNOSIS — Z90.49 ACQUIRED ABSENCE OF OTHER SPECIFIED PARTS OF DIGESTIVE TRACT: Chronic | ICD-10-CM

## 2021-06-24 DIAGNOSIS — R31.29 OTHER MICROSCOPIC HEMATURIA: ICD-10-CM

## 2021-06-24 PROCEDURE — 99072 ADDL SUPL MATRL&STAF TM PHE: CPT

## 2021-06-24 PROCEDURE — 99215 OFFICE O/P EST HI 40 MIN: CPT | Mod: 25

## 2021-06-24 PROCEDURE — 64430 NJX AA&/STRD PUDENDAL NERVE: CPT | Mod: 50,59

## 2021-06-24 PROCEDURE — 64430 NJX AA&/STRD PUDENDAL NERVE: CPT | Mod: 50

## 2021-06-24 PROCEDURE — 20553 NJX 1/MLT TRIGGER POINTS 3/>: CPT

## 2021-07-06 ENCOUNTER — APPOINTMENT (OUTPATIENT)
Dept: ORTHOPEDIC SURGERY | Facility: CLINIC | Age: 56
End: 2021-07-06

## 2021-07-09 ENCOUNTER — OUTPATIENT (OUTPATIENT)
Dept: OUTPATIENT SERVICES | Facility: HOSPITAL | Age: 56
LOS: 1 days | End: 2021-07-09
Payer: COMMERCIAL

## 2021-07-09 ENCOUNTER — APPOINTMENT (OUTPATIENT)
Dept: ULTRASOUND IMAGING | Facility: CLINIC | Age: 56
End: 2021-07-09
Payer: COMMERCIAL

## 2021-07-09 DIAGNOSIS — Z00.8 ENCOUNTER FOR OTHER GENERAL EXAMINATION: ICD-10-CM

## 2021-07-09 DIAGNOSIS — R10.13 EPIGASTRIC PAIN: ICD-10-CM

## 2021-07-09 DIAGNOSIS — Z90.49 ACQUIRED ABSENCE OF OTHER SPECIFIED PARTS OF DIGESTIVE TRACT: Chronic | ICD-10-CM

## 2021-07-09 PROCEDURE — 93975 VASCULAR STUDY: CPT | Mod: 26

## 2021-07-09 PROCEDURE — 93975 VASCULAR STUDY: CPT

## 2021-07-12 ENCOUNTER — TRANSCRIPTION ENCOUNTER (OUTPATIENT)
Age: 56
End: 2021-07-12

## 2021-07-20 ENCOUNTER — APPOINTMENT (OUTPATIENT)
Dept: PSYCHIATRY | Facility: CLINIC | Age: 56
End: 2021-07-20
Payer: COMMERCIAL

## 2021-07-20 ENCOUNTER — APPOINTMENT (OUTPATIENT)
Dept: ORTHOPEDIC SURGERY | Facility: CLINIC | Age: 56
End: 2021-07-20
Payer: COMMERCIAL

## 2021-07-20 DIAGNOSIS — G47.00 INSOMNIA, UNSPECIFIED: ICD-10-CM

## 2021-07-20 PROCEDURE — 99213 OFFICE O/P EST LOW 20 MIN: CPT

## 2021-07-20 PROCEDURE — 99205 OFFICE O/P NEW HI 60 MIN: CPT

## 2021-07-20 PROCEDURE — 99072 ADDL SUPL MATRL&STAF TM PHE: CPT

## 2021-07-27 ENCOUNTER — NON-APPOINTMENT (OUTPATIENT)
Age: 56
End: 2021-07-27

## 2021-07-29 ENCOUNTER — NON-APPOINTMENT (OUTPATIENT)
Age: 56
End: 2021-07-29

## 2021-08-10 ENCOUNTER — APPOINTMENT (OUTPATIENT)
Dept: PSYCHIATRY | Facility: CLINIC | Age: 56
End: 2021-08-10
Payer: COMMERCIAL

## 2021-08-10 DIAGNOSIS — F41.1 GENERALIZED ANXIETY DISORDER: ICD-10-CM

## 2021-08-10 DIAGNOSIS — F32.4 MAJOR DEPRESSIVE DISORDER, SINGLE EPISODE, IN PARTIAL REMISSION: ICD-10-CM

## 2021-08-10 PROCEDURE — 99214 OFFICE O/P EST MOD 30 MIN: CPT

## 2021-08-10 RX ORDER — CLONAZEPAM 0.5 MG/1
0.5 TABLET ORAL
Refills: 0 | Status: ACTIVE | COMMUNITY
Start: 2021-07-20

## 2021-08-11 ENCOUNTER — APPOINTMENT (OUTPATIENT)
Dept: UROLOGY | Facility: CLINIC | Age: 56
End: 2021-08-11
Payer: COMMERCIAL

## 2021-08-11 ENCOUNTER — OUTPATIENT (OUTPATIENT)
Dept: OUTPATIENT SERVICES | Facility: HOSPITAL | Age: 56
LOS: 1 days | End: 2021-08-11
Payer: COMMERCIAL

## 2021-08-11 VITALS — SYSTOLIC BLOOD PRESSURE: 114 MMHG | DIASTOLIC BLOOD PRESSURE: 68 MMHG | HEART RATE: 63 BPM | TEMPERATURE: 98.6 F

## 2021-08-11 DIAGNOSIS — N94.10 UNSPECIFIED DYSPAREUNIA: ICD-10-CM

## 2021-08-11 DIAGNOSIS — Z90.49 ACQUIRED ABSENCE OF OTHER SPECIFIED PARTS OF DIGESTIVE TRACT: Chronic | ICD-10-CM

## 2021-08-11 DIAGNOSIS — R31.29 OTHER MICROSCOPIC HEMATURIA: ICD-10-CM

## 2021-08-11 DIAGNOSIS — M79.18 MYALGIA, OTHER SITE: ICD-10-CM

## 2021-08-11 DIAGNOSIS — N35.92 UNSPECIFIED URETHRAL STRICTURE, FEMALE: ICD-10-CM

## 2021-08-11 DIAGNOSIS — R35.0 FREQUENCY OF MICTURITION: ICD-10-CM

## 2021-08-11 PROCEDURE — 99213 OFFICE O/P EST LOW 20 MIN: CPT | Mod: 25

## 2021-08-11 PROCEDURE — 52281 CYSTOSCOPY AND TREATMENT: CPT

## 2021-08-17 ENCOUNTER — OUTPATIENT (OUTPATIENT)
Dept: OUTPATIENT SERVICES | Facility: HOSPITAL | Age: 56
LOS: 1 days | End: 2021-08-17
Payer: COMMERCIAL

## 2021-08-17 ENCOUNTER — APPOINTMENT (OUTPATIENT)
Dept: CARDIOLOGY | Facility: CLINIC | Age: 56
End: 2021-08-17
Payer: COMMERCIAL

## 2021-08-17 ENCOUNTER — APPOINTMENT (OUTPATIENT)
Dept: CARDIOLOGY | Facility: CLINIC | Age: 56
End: 2021-08-17

## 2021-08-17 ENCOUNTER — APPOINTMENT (OUTPATIENT)
Dept: CT IMAGING | Facility: CLINIC | Age: 56
End: 2021-08-17
Payer: COMMERCIAL

## 2021-08-17 ENCOUNTER — NON-APPOINTMENT (OUTPATIENT)
Age: 56
End: 2021-08-17

## 2021-08-17 VITALS
DIASTOLIC BLOOD PRESSURE: 80 MMHG | SYSTOLIC BLOOD PRESSURE: 123 MMHG | WEIGHT: 112 LBS | OXYGEN SATURATION: 95 % | BODY MASS INDEX: 19.12 KG/M2 | HEIGHT: 64 IN | HEART RATE: 91 BPM

## 2021-08-17 DIAGNOSIS — Z00.8 ENCOUNTER FOR OTHER GENERAL EXAMINATION: ICD-10-CM

## 2021-08-17 DIAGNOSIS — Z01.818 ENCOUNTER FOR OTHER PREPROCEDURAL EXAMINATION: ICD-10-CM

## 2021-08-17 DIAGNOSIS — R31.29 OTHER MICROSCOPIC HEMATURIA: ICD-10-CM

## 2021-08-17 DIAGNOSIS — Z90.49 ACQUIRED ABSENCE OF OTHER SPECIFIED PARTS OF DIGESTIVE TRACT: Chronic | ICD-10-CM

## 2021-08-17 DIAGNOSIS — M79.18 MYALGIA, OTHER SITE: ICD-10-CM

## 2021-08-17 PROCEDURE — 74178 CT ABD&PLV WO CNTR FLWD CNTR: CPT

## 2021-08-17 PROCEDURE — 74178 CT ABD&PLV WO CNTR FLWD CNTR: CPT | Mod: 26

## 2021-08-17 PROCEDURE — 99214 OFFICE O/P EST MOD 30 MIN: CPT

## 2021-08-17 PROCEDURE — 82565 ASSAY OF CREATININE: CPT

## 2021-08-17 PROCEDURE — 93000 ELECTROCARDIOGRAM COMPLETE: CPT

## 2021-08-17 NOTE — PHYSICAL EXAM
[Normal Appearance] : normal appearance [Well Groomed] : well groomed [General Appearance - In No Acute Distress] : no acute distress [Normal Conjunctiva] : the conjunctiva exhibited no abnormalities [Eyelids - No Xanthelasma] : the eyelids demonstrated no xanthelasmas [No Oral Pallor] : no oral pallor [Normal Oral Mucosa] : normal oral mucosa [No Oral Cyanosis] : no oral cyanosis [Normal Oropharynx] : normal oropharynx [Normal Jugular Venous A Waves Present] : normal jugular venous A waves present [No Jugular Venous Cárdenas A Waves] : no jugular venous cárdenas A waves [Normal Jugular Venous V Waves Present] : normal jugular venous V waves present [Respiration, Rhythm And Depth] : normal respiratory rhythm and effort [Exaggerated Use Of Accessory Muscles For Inspiration] : no accessory muscle use [Auscultation Breath Sounds / Voice Sounds] : lungs were clear to auscultation bilaterally [Abdomen Soft] : soft [Bowel Sounds] : normal bowel sounds [Abdomen Tenderness] : non-tender [Abdomen Mass (___ Cm)] : no abdominal mass palpated [Abnormal Walk] : normal gait [Gait - Sufficient For Exercise Testing] : the gait was sufficient for exercise testing [Nail Clubbing] : no clubbing of the fingernails [Cyanosis, Localized] : no localized cyanosis [Petechial Hemorrhages (___cm)] : no petechial hemorrhages [Skin Color & Pigmentation] : normal skin color and pigmentation [] : no rash [No Venous Stasis] : no venous stasis [Skin Lesions] : no skin lesions [Oriented To Time, Place, And Person] : oriented to person, place, and time [Affect] : the affect was normal [Mood] : the mood was normal [No Anxiety] : not feeling anxious [5th Left ICS - MCL] : palpated at the 5th LICS in the midclavicular line [Normal] : normal [Normal Rate] : normal [No Precordial Heave] : no precordial heave was noted [Rhythm Regular] : regular [Normal S1] : normal S1 [Normal S2] : normal S2 [No Gallop] : no gallop heard [I] : a grade 1 [2+] : left 2+ [No Abnormalities] : the abdominal aorta was not enlarged and no bruit was heard [No Pitting Edema] : no pitting edema present [FreeTextEntry1] : No abdominal bruits [S3] : no S3 [S4] : no S4 [Right Carotid Bruit] : no bruit heard over the right carotid [Left Carotid Bruit] : no bruit heard over the left carotid

## 2021-08-17 NOTE — CARDIOLOGY SUMMARY
[No Ischemia] : no Ischemia [LVEF ___%] : LVEF [unfilled]% [None] : normal LV function [Mild] : mild mitral regurgitation [Normal] : normal [___] : [unfilled]

## 2021-08-17 NOTE — HISTORY OF PRESENT ILLNESS
[FreeTextEntry1] : Mrs. 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 She has had low normal LV function since then.  She also has a history of bilateral carotid dissections that completely healed, as well as a recent diagnosis of FMD of the right renal artery, and Hashimoto's thyroiditis.  She had previously been on AC with Coumadin, but is currently only on aspirin therapy.  \par \par 9/19 the patient had a recurrent episode of chest pain, headache, and diaphoresis that was similar to the event in 2005. She went to the emergency room where her initial troponin was normal.\par The troponin level increased to 2.4 and she was sent for plain vitamin Connell for cardiac catheterization. This showed normal arteries with an ejection fraction of 55%. Echocardiogram showed ejection fraction of 45-50% with hypocontractility of the anterior wall and septum. A peak troponin T went up to 180. Was felt that she had a small myocardial infarction on the basis of spasm or blood clot. Her medications were not changed. In the hospital cholesterol was 178, triglycerides 34, HDL 88, and LDL 83 . She was placed on Plavix along with low-dose aspirin and losartan. Repeat echocardiogram performed 1/20 demonstrated a normal ejection fraction of 62% with mild MR.\par \par MRA scan of the neck 7/17 was normal. She had a CT angiogram of the heart 3/16 showed a calcium score 0 without any coronary disease.She had a recent carotid Doppler that according to the patient demonstrates mild plaque. Repeat MRA of the neck performed 7/20 was unchanged. There was distal narrowing of the internal carotid arteries consistent with history of prior dissection.\par \par Blood were 4/20 demonstrated normal CBC and chemistries. Cholesterol 198, triglycerides 83, HDL 92, and LDL 90. 12/20 the patient had normal CBC, chemistries, PT, and PTT. Positive Cardiolipin antibody.\par \par Patient is scheduled to have a urological procedure involving Botox injections in the vaginal area to treat Dyspareunia.\par \par ECG demonstrates sinus rhythm.  There is a right IVCD with nonspecific T wave this represents no change.

## 2021-08-17 NOTE — DISCUSSION/SUMMARY
[FreeTextEntry1] : This is a 55-year-old female who has had apical ballooning syndrome with normal cardiac catheterization.  Does have fibromuscular dysplasia, hyperlipidemia, anticardiolipin antibody and has had arterial dissections of the carotid and possible coronary arteries.  At the present time she is feeling well without any symptoms of chest pain, shortness of breath, or palpitation.  She has good functional status.  Exam and ECG unchanged.\par \par Her cardiac status is stable and she represents a satisfactory candidate for the planned urological procedure.  No special precautions other than routine hemodynamic monitoring should be required.  Plavix will be held for 3 days and should be restarted as soon as you feel it is safe.

## 2021-08-19 ENCOUNTER — OUTPATIENT (OUTPATIENT)
Dept: OUTPATIENT SERVICES | Facility: HOSPITAL | Age: 56
LOS: 1 days | End: 2021-08-19
Payer: COMMERCIAL

## 2021-08-19 VITALS
TEMPERATURE: 97 F | SYSTOLIC BLOOD PRESSURE: 120 MMHG | OXYGEN SATURATION: 99 % | WEIGHT: 113.54 LBS | RESPIRATION RATE: 20 BRPM | HEART RATE: 66 BPM | HEIGHT: 64 IN | DIASTOLIC BLOOD PRESSURE: 76 MMHG

## 2021-08-19 DIAGNOSIS — M62.89 OTHER SPECIFIED DISORDERS OF MUSCLE: ICD-10-CM

## 2021-08-19 DIAGNOSIS — Z98.890 OTHER SPECIFIED POSTPROCEDURAL STATES: Chronic | ICD-10-CM

## 2021-08-19 DIAGNOSIS — Z01.818 ENCOUNTER FOR OTHER PREPROCEDURAL EXAMINATION: ICD-10-CM

## 2021-08-19 DIAGNOSIS — Z90.49 ACQUIRED ABSENCE OF OTHER SPECIFIED PARTS OF DIGESTIVE TRACT: Chronic | ICD-10-CM

## 2021-08-19 DIAGNOSIS — Z98.82 BREAST IMPLANT STATUS: Chronic | ICD-10-CM

## 2021-08-19 DIAGNOSIS — I21.9 ACUTE MYOCARDIAL INFARCTION, UNSPECIFIED: ICD-10-CM

## 2021-08-19 DIAGNOSIS — R31.29 OTHER MICROSCOPIC HEMATURIA: ICD-10-CM

## 2021-08-19 DIAGNOSIS — Q67.6 PECTUS EXCAVATUM: Chronic | ICD-10-CM

## 2021-08-19 LAB
ANION GAP SERPL CALC-SCNC: 13 MMOL/L — SIGNIFICANT CHANGE UP (ref 5–17)
BUN SERPL-MCNC: 12 MG/DL — SIGNIFICANT CHANGE UP (ref 7–23)
CALCIUM SERPL-MCNC: 9.7 MG/DL — SIGNIFICANT CHANGE UP (ref 8.4–10.5)
CHLORIDE SERPL-SCNC: 102 MMOL/L — SIGNIFICANT CHANGE UP (ref 96–108)
CO2 SERPL-SCNC: 25 MMOL/L — SIGNIFICANT CHANGE UP (ref 22–31)
CREAT SERPL-MCNC: 0.72 MG/DL — SIGNIFICANT CHANGE UP (ref 0.5–1.3)
GLUCOSE SERPL-MCNC: 68 MG/DL — LOW (ref 70–99)
HCT VFR BLD CALC: 40.3 % — SIGNIFICANT CHANGE UP (ref 34.5–45)
HGB BLD-MCNC: 13.1 G/DL — SIGNIFICANT CHANGE UP (ref 11.5–15.5)
MCHC RBC-ENTMCNC: 31.2 PG — SIGNIFICANT CHANGE UP (ref 27–34)
MCHC RBC-ENTMCNC: 32.5 GM/DL — SIGNIFICANT CHANGE UP (ref 32–36)
MCV RBC AUTO: 96 FL — SIGNIFICANT CHANGE UP (ref 80–100)
NRBC # BLD: 0 /100 WBCS — SIGNIFICANT CHANGE UP (ref 0–0)
PLATELET # BLD AUTO: 278 K/UL — SIGNIFICANT CHANGE UP (ref 150–400)
POTASSIUM SERPL-MCNC: 4.5 MMOL/L — SIGNIFICANT CHANGE UP (ref 3.5–5.3)
POTASSIUM SERPL-SCNC: 4.5 MMOL/L — SIGNIFICANT CHANGE UP (ref 3.5–5.3)
RBC # BLD: 4.2 M/UL — SIGNIFICANT CHANGE UP (ref 3.8–5.2)
RBC # FLD: 12.6 % — SIGNIFICANT CHANGE UP (ref 10.3–14.5)
SODIUM SERPL-SCNC: 140 MMOL/L — SIGNIFICANT CHANGE UP (ref 135–145)
WBC # BLD: 7.02 K/UL — SIGNIFICANT CHANGE UP (ref 3.8–10.5)
WBC # FLD AUTO: 7.02 K/UL — SIGNIFICANT CHANGE UP (ref 3.8–10.5)

## 2021-08-19 PROCEDURE — 80048 BASIC METABOLIC PNL TOTAL CA: CPT

## 2021-08-19 PROCEDURE — 85027 COMPLETE CBC AUTOMATED: CPT

## 2021-08-19 PROCEDURE — G0463: CPT

## 2021-08-19 RX ORDER — ASPIRIN/CALCIUM CARB/MAGNESIUM 324 MG
1 TABLET ORAL
Qty: 0 | Refills: 0 | DISCHARGE

## 2021-08-19 RX ORDER — SERTRALINE 25 MG/1
1 TABLET, FILM COATED ORAL
Qty: 0 | Refills: 0 | DISCHARGE

## 2021-08-19 RX ORDER — SODIUM CHLORIDE 9 MG/ML
3 INJECTION INTRAMUSCULAR; INTRAVENOUS; SUBCUTANEOUS EVERY 8 HOURS
Refills: 0 | Status: DISCONTINUED | OUTPATIENT
Start: 2021-08-24 | End: 2021-09-07

## 2021-08-19 RX ORDER — LIDOCAINE HCL 20 MG/ML
0.2 VIAL (ML) INJECTION ONCE
Refills: 0 | Status: DISCONTINUED | OUTPATIENT
Start: 2021-08-24 | End: 2021-09-07

## 2021-08-19 RX ORDER — LINACLOTIDE 145 UG/1
1 CAPSULE, GELATIN COATED ORAL
Qty: 0 | Refills: 0 | DISCHARGE

## 2021-08-19 RX ORDER — VERAPAMIL HCL 240 MG
1 CAPSULE, EXTENDED RELEASE PELLETS 24 HR ORAL
Qty: 0 | Refills: 0 | DISCHARGE

## 2021-08-19 RX ORDER — LEVOTHYROXINE SODIUM 125 MCG
1 TABLET ORAL
Qty: 0 | Refills: 0 | DISCHARGE

## 2021-08-19 RX ORDER — DULOXETINE HYDROCHLORIDE 30 MG/1
1 CAPSULE, DELAYED RELEASE ORAL
Qty: 0 | Refills: 0 | DISCHARGE

## 2021-08-19 RX ORDER — ESZOPICLONE 2 MG/1
1 TABLET, COATED ORAL
Qty: 0 | Refills: 0 | DISCHARGE

## 2021-08-19 RX ORDER — ALPRAZOLAM 0.25 MG
1 TABLET ORAL
Qty: 0 | Refills: 0 | DISCHARGE

## 2021-08-19 RX ORDER — CLONAZEPAM 1 MG
1 TABLET ORAL
Qty: 0 | Refills: 0 | DISCHARGE

## 2021-08-19 NOTE — H&P PST ADULT - NSICDXPASTMEDICALHX_GEN_ALL_CORE_FT
PAST MEDICAL HISTORY:  Anticardiolipin Antibody Positive     CAD (Coronary Artery Disease)     Carotid dissection, bilateral 2011- healed self- TIA- neck cannot be hyper extented    Coronary artery spasm     Fibromuscular dysplasia cannot get HR above 130    FMD (frontometaphyseal dysplasia) right renal artery, iliac artery    Hashimoto's thyroiditis ON    Irritable bowel syndrome (IBS)     Myocardial Infarct 2005, 2018    Pelvic floor dysfunction     TIA (transient ischemic attack) 2011     PAST MEDICAL HISTORY:  Anticardiolipin Antibody Positive     CAD (Coronary Artery Disease)     Carotid dissection, bilateral 2011- healed self- TIA- neck cannot be hyper extented    Coronary artery spasm     Dissection of mesenteric artery SMA- healed    Fibromuscular dysplasia cannot get HR above 130    FMD (frontometaphyseal dysplasia) right renal artery, iliac artery    Hashimoto's thyroiditis ON    Irritable bowel syndrome (IBS)     Myocardial Infarct 2005, 2018    Pelvic floor dysfunction     TIA (transient ischemic attack) 2011

## 2021-08-19 NOTE — H&P PST ADULT - NSICDXPASTSURGICALHX_GEN_ALL_CORE_FT
PAST SURGICAL HISTORY:  Congenital pectus excavatum s/p surgery    H/O cosmetic surgery face lift    H/O umbilical hernia repair     History of appendectomy     S/P bilateral breast implants age 19 's    S/P correction of deviated nasal septum     S/P saline breast implant bilateral age 29 's     PAST SURGICAL HISTORY:  Congenital pectus excavatum s/p surgery    H/O cosmetic surgery face lift 3/2021    H/O umbilical hernia repair     History of appendectomy     S/P bilateral breast implants age 19 's    S/P correction of deviated nasal septum     S/P saline breast implant bilateral age 29 's

## 2021-08-19 NOTE — H&P PST ADULT - ANESTHESIA, PREVIOUS REACTION, PROFILE
cannot hyperextend the neck/nausea/vomiting cannot hyperextend the neck - due to h/o carotid artey dissection/nausea/vomiting

## 2021-08-19 NOTE — H&P PST ADULT - HISTORY OF PRESENT ILLNESS
Denies Recent travel, Exposure or Covid symptoms  Covid test - 8/21/2021  covid vaccine - 2 doses  55 Year old female with history of MI (2005) & 2019- Due to coronary artery spasm / takusubo syndrome , Bilateral carotid artery dissections 2011- TIA - healed self , Fibromuscular dysplasia of renal artery & iliac artery, Anticardiolipin antibody positive, SMA dissection (2018)- healed ,  Hashimotos thyroiditis, IBS, Pelvic floor dysfunction - with c/o dyspareunia for several months with minimal relief from trigger point injections. Now coming in for Bilateral pudendal nerve blocks Botox injection 200 u to pelvic floor musculature on 8/24/2021.       Denies Recent travel, Exposure or Covid symptoms  Covid test - 8/21/2021  covid vaccine - 2 doses  55 Year old female with history of MI (2005) & 2019- Due to coronary artery spasm / takusubo syndrome , Bilateral carotid artery dissections 2011- TIA - healed self , Fibromuscular dysplasia of renal artery & iliac artery, Anticardiolipin antibody positive, SMA dissection (2018)- healed ,  Hashimotos thyroiditis, IBS, Pelvic floor dysfunction - with c/o dyspareunia for several months with minimal relief from trigger point injections. Now coming in for Bilateral pudendal nerve blocks Botox injection 200 u to pelvic floor musculature on 8/24/2021.     **** case discussed with LILLIE Julien to proceed in ambi ******      Denies Recent travel, Exposure or Covid symptoms  Covid test - 8/21/2021  covid vaccine - 2 doses

## 2021-08-20 DIAGNOSIS — Z01.818 ENCOUNTER FOR OTHER PREPROCEDURAL EXAMINATION: ICD-10-CM

## 2021-08-20 PROBLEM — G45.9 TRANSIENT CEREBRAL ISCHEMIC ATTACK, UNSPECIFIED: Chronic | Status: ACTIVE | Noted: 2021-08-19

## 2021-08-20 PROBLEM — K58.9 IRRITABLE BOWEL SYNDROME, UNSPECIFIED: Chronic | Status: ACTIVE | Noted: 2021-08-19

## 2021-08-20 PROBLEM — I77.79 DISSECTION OF OTHER SPECIFIED ARTERY: Chronic | Status: ACTIVE | Noted: 2021-08-19

## 2021-08-20 PROBLEM — I20.1 ANGINA PECTORIS WITH DOCUMENTED SPASM: Chronic | Status: ACTIVE | Noted: 2021-08-19

## 2021-08-20 PROBLEM — K58.9 IRRITABLE BOWEL SYNDROME WITHOUT DIARRHEA: Chronic | Status: ACTIVE | Noted: 2021-08-19

## 2021-08-20 PROBLEM — Q78.9 OSTEOCHONDRODYSPLASIA, UNSPECIFIED: Chronic | Status: ACTIVE | Noted: 2021-08-19

## 2021-08-20 PROBLEM — M62.89 OTHER SPECIFIED DISORDERS OF MUSCLE: Chronic | Status: ACTIVE | Noted: 2021-08-19

## 2021-08-20 PROBLEM — E06.3 AUTOIMMUNE THYROIDITIS: Chronic | Status: ACTIVE | Noted: 2019-09-29

## 2021-08-20 PROBLEM — I77.3 ARTERIAL FIBROMUSCULAR DYSPLASIA: Chronic | Status: ACTIVE | Noted: 2021-08-19

## 2021-08-20 PROBLEM — I77.71 DISSECTION OF CAROTID ARTERY: Chronic | Status: ACTIVE | Noted: 2019-09-29

## 2021-08-21 ENCOUNTER — APPOINTMENT (OUTPATIENT)
Dept: DISASTER EMERGENCY | Facility: CLINIC | Age: 56
End: 2021-08-21

## 2021-08-21 ENCOUNTER — OUTPATIENT (OUTPATIENT)
Dept: OUTPATIENT SERVICES | Facility: HOSPITAL | Age: 56
LOS: 1 days | End: 2021-08-21
Payer: COMMERCIAL

## 2021-08-21 DIAGNOSIS — Z11.52 ENCOUNTER FOR SCREENING FOR COVID-19: ICD-10-CM

## 2021-08-21 DIAGNOSIS — Z90.49 ACQUIRED ABSENCE OF OTHER SPECIFIED PARTS OF DIGESTIVE TRACT: Chronic | ICD-10-CM

## 2021-08-21 DIAGNOSIS — Q67.6 PECTUS EXCAVATUM: Chronic | ICD-10-CM

## 2021-08-21 DIAGNOSIS — Z98.82 BREAST IMPLANT STATUS: Chronic | ICD-10-CM

## 2021-08-21 DIAGNOSIS — Z98.890 OTHER SPECIFIED POSTPROCEDURAL STATES: Chronic | ICD-10-CM

## 2021-08-21 LAB — SARS-COV-2 RNA SPEC QL NAA+PROBE: SIGNIFICANT CHANGE UP

## 2021-08-21 PROCEDURE — U0005: CPT

## 2021-08-21 PROCEDURE — C9803: CPT

## 2021-08-21 PROCEDURE — U0003: CPT

## 2021-08-23 ENCOUNTER — TRANSCRIPTION ENCOUNTER (OUTPATIENT)
Age: 56
End: 2021-08-23

## 2021-08-24 ENCOUNTER — OUTPATIENT (OUTPATIENT)
Dept: OUTPATIENT SERVICES | Facility: HOSPITAL | Age: 56
LOS: 1 days | End: 2021-08-24
Payer: COMMERCIAL

## 2021-08-24 ENCOUNTER — APPOINTMENT (OUTPATIENT)
Dept: UROLOGY | Facility: HOSPITAL | Age: 56
End: 2021-08-24

## 2021-08-24 VITALS
DIASTOLIC BLOOD PRESSURE: 72 MMHG | RESPIRATION RATE: 16 BRPM | HEIGHT: 64 IN | WEIGHT: 113.54 LBS | HEART RATE: 62 BPM | OXYGEN SATURATION: 100 % | TEMPERATURE: 98 F | SYSTOLIC BLOOD PRESSURE: 111 MMHG

## 2021-08-24 VITALS
DIASTOLIC BLOOD PRESSURE: 47 MMHG | HEART RATE: 51 BPM | SYSTOLIC BLOOD PRESSURE: 95 MMHG | OXYGEN SATURATION: 100 % | RESPIRATION RATE: 16 BRPM

## 2021-08-24 DIAGNOSIS — Z90.49 ACQUIRED ABSENCE OF OTHER SPECIFIED PARTS OF DIGESTIVE TRACT: Chronic | ICD-10-CM

## 2021-08-24 DIAGNOSIS — R31.29 OTHER MICROSCOPIC HEMATURIA: ICD-10-CM

## 2021-08-24 DIAGNOSIS — Q67.6 PECTUS EXCAVATUM: Chronic | ICD-10-CM

## 2021-08-24 DIAGNOSIS — Z98.82 BREAST IMPLANT STATUS: Chronic | ICD-10-CM

## 2021-08-24 DIAGNOSIS — Z98.890 OTHER SPECIFIED POSTPROCEDURAL STATES: Chronic | ICD-10-CM

## 2021-08-24 PROCEDURE — 64646 CHEMODENERV TRUNK MUSC 1-5: CPT

## 2021-08-24 PROCEDURE — 64430 NJX AA&/STRD PUDENDAL NERVE: CPT | Mod: 50,59

## 2021-08-24 PROCEDURE — 20553 NJX 1/MLT TRIGGER POINTS 3/>: CPT

## 2021-08-24 RX ORDER — CLOPIDOGREL BISULFATE 75 MG/1
1 TABLET, FILM COATED ORAL
Qty: 0 | Refills: 0 | DISCHARGE

## 2021-08-24 RX ORDER — ALPRAZOLAM 0.25 MG
1 TABLET ORAL
Qty: 0 | Refills: 0 | DISCHARGE

## 2021-08-24 RX ORDER — OXYCODONE HYDROCHLORIDE 5 MG/1
5 TABLET ORAL ONCE
Refills: 0 | Status: DISCONTINUED | OUTPATIENT
Start: 2021-08-24 | End: 2021-08-24

## 2021-08-24 RX ORDER — VERAPAMIL HCL 240 MG
1 CAPSULE, EXTENDED RELEASE PELLETS 24 HR ORAL
Qty: 0 | Refills: 0 | DISCHARGE

## 2021-08-24 RX ORDER — SERTRALINE 25 MG/1
1 TABLET, FILM COATED ORAL
Qty: 0 | Refills: 0 | DISCHARGE

## 2021-08-24 RX ORDER — ONDANSETRON 8 MG/1
4 TABLET, FILM COATED ORAL ONCE
Refills: 0 | Status: DISCONTINUED | OUTPATIENT
Start: 2021-08-24 | End: 2021-09-07

## 2021-08-24 RX ORDER — SODIUM CHLORIDE 9 MG/ML
1000 INJECTION, SOLUTION INTRAVENOUS
Refills: 0 | Status: DISCONTINUED | OUTPATIENT
Start: 2021-08-24 | End: 2021-09-07

## 2021-08-24 RX ORDER — HYDROMORPHONE HYDROCHLORIDE 2 MG/ML
0.25 INJECTION INTRAMUSCULAR; INTRAVENOUS; SUBCUTANEOUS
Refills: 0 | Status: DISCONTINUED | OUTPATIENT
Start: 2021-08-24 | End: 2021-08-24

## 2021-08-24 NOTE — ASU DISCHARGE PLAN (ADULT/PEDIATRIC) - NURSING INSTRUCTIONS
You were given Tylenol during your visit, the next dose of Tylenol will be on or after ______3:00 PM_____ ,today/tonight and every 6 hours afterwards for pain management, do not take any Tylenol containing products until this time. Do not exceed more than 4000mg of Tylenol in one 24 hour setting. If no contraindications, you may alternate with Ibuprofen or Naproxen 3 hours after dose of Tylenol. Ibuprofen can be taken every 6 hours. Naproxen may be taken every 12 hours.

## 2021-08-24 NOTE — ASU DISCHARGE PLAN (ADULT/PEDIATRIC) - PROCEDURE
Bilateral pudendal nerve block, trigger point injections into pelvic floor muscles with 200 Units of Botox

## 2021-08-24 NOTE — ASU DISCHARGE PLAN (ADULT/PEDIATRIC) - CARE PROVIDER_API CALL
Eddie Blake)  Urology  [address] [place] [address]  Phone: [phone]  Fax: [phone]  Follow Up Time:

## 2021-08-24 NOTE — ASU PATIENT PROFILE, ADULT - NSICDXPASTMEDICALHX_GEN_ALL_CORE_FT
PAST MEDICAL HISTORY:  Anticardiolipin Antibody Positive     CAD (Coronary Artery Disease)     Carotid dissection, bilateral 2011- healed self- TIA- neck cannot be hyper extented    Coronary artery spasm     Dissection of mesenteric artery SMA- healed    Fibromuscular dysplasia cannot get HR above 130    FMD (frontometaphyseal dysplasia) right renal artery, iliac artery    Hashimoto's thyroiditis ON    Irritable bowel syndrome (IBS)     Myocardial Infarct 2005, 2018    Pelvic floor dysfunction     TIA (transient ischemic attack) 2011

## 2021-08-24 NOTE — ASU PATIENT PROFILE, ADULT - NSICDXPASTSURGICALHX_GEN_ALL_CORE_FT
PAST SURGICAL HISTORY:  Congenital pectus excavatum s/p surgery    H/O cosmetic surgery face lift 3/2021    H/O umbilical hernia repair     History of appendectomy     S/P bilateral breast implants age 19 's    S/P correction of deviated nasal septum     S/P saline breast implant bilateral age 29 's

## 2021-09-28 LAB
COVID-19 SPIKE DOMAIN ANTIBODY INTERPRETATION: POSITIVE
SARS-COV-2 AB SERPL IA-ACNC: >250 U/ML

## 2021-10-01 ENCOUNTER — NON-APPOINTMENT (OUTPATIENT)
Age: 56
End: 2021-10-01

## 2021-10-04 ENCOUNTER — FORM ENCOUNTER (OUTPATIENT)
Age: 56
End: 2021-10-04

## 2021-10-04 ENCOUNTER — APPOINTMENT (OUTPATIENT)
Dept: PSYCHIATRY | Facility: CLINIC | Age: 56
End: 2021-10-04

## 2021-10-05 ENCOUNTER — FORM ENCOUNTER (OUTPATIENT)
Age: 56
End: 2021-10-05

## 2021-10-07 ENCOUNTER — RESULT REVIEW (OUTPATIENT)
Age: 56
End: 2021-10-07

## 2021-10-22 ENCOUNTER — APPOINTMENT (OUTPATIENT)
Dept: UROLOGY | Facility: CLINIC | Age: 56
End: 2021-10-22

## 2021-10-22 ENCOUNTER — NON-APPOINTMENT (OUTPATIENT)
Age: 56
End: 2021-10-22

## 2021-10-22 DIAGNOSIS — Z78.9 OTHER SPECIFIED HEALTH STATUS: ICD-10-CM

## 2021-10-22 DIAGNOSIS — Z83.3 FAMILY HISTORY OF DIABETES MELLITUS: ICD-10-CM

## 2021-10-22 DIAGNOSIS — Z80.41 FAMILY HISTORY OF MALIGNANT NEOPLASM OF OVARY: ICD-10-CM

## 2021-10-22 DIAGNOSIS — Z80.1 FAMILY HISTORY OF MALIGNANT NEOPLASM OF TRACHEA, BRONCHUS AND LUNG: ICD-10-CM

## 2021-10-22 DIAGNOSIS — Z86.018 PERSONAL HISTORY OF OTHER BENIGN NEOPLASM: ICD-10-CM

## 2021-10-22 DIAGNOSIS — Z87.19 PERSONAL HISTORY OF OTHER DISEASES OF THE DIGESTIVE SYSTEM: ICD-10-CM

## 2021-10-22 DIAGNOSIS — I25.2 OLD MYOCARDIAL INFARCTION: ICD-10-CM

## 2021-10-22 DIAGNOSIS — I77.71 DISSECTION OF CAROTID ARTERY: ICD-10-CM

## 2021-10-22 DIAGNOSIS — Z80.0 FAMILY HISTORY OF MALIGNANT NEOPLASM OF DIGESTIVE ORGANS: ICD-10-CM

## 2021-10-22 DIAGNOSIS — Z87.42 PERSONAL HISTORY OF OTHER DISEASES OF THE FEMALE GENITAL TRACT: ICD-10-CM

## 2021-10-22 DIAGNOSIS — Z86.73 PERSONAL HISTORY OF TRANSIENT ISCHEMIC ATTACK (TIA), AND CEREBRAL INFARCTION W/OUT RESIDUAL DEFICITS: ICD-10-CM

## 2021-10-22 LAB — CYTOLOGY CVX/VAG DOC THIN PREP: NORMAL

## 2021-10-22 RX ORDER — ESZOPICLONE 3 MG/1
3 TABLET, COATED ORAL
Refills: 0 | Status: ACTIVE | COMMUNITY

## 2021-11-23 ENCOUNTER — RX RENEWAL (OUTPATIENT)
Age: 56
End: 2021-11-23

## 2021-12-10 ENCOUNTER — RX RENEWAL (OUTPATIENT)
Age: 56
End: 2021-12-10

## 2021-12-22 ENCOUNTER — APPOINTMENT (OUTPATIENT)
Dept: CARDIOLOGY | Facility: CLINIC | Age: 56
End: 2021-12-22
Payer: COMMERCIAL

## 2021-12-22 ENCOUNTER — LABORATORY RESULT (OUTPATIENT)
Age: 56
End: 2021-12-22

## 2021-12-22 VITALS
OXYGEN SATURATION: 98 % | HEIGHT: 64 IN | HEART RATE: 64 BPM | WEIGHT: 112 LBS | BODY MASS INDEX: 19.12 KG/M2 | TEMPERATURE: 97.4 F | DIASTOLIC BLOOD PRESSURE: 58 MMHG | SYSTOLIC BLOOD PRESSURE: 103 MMHG

## 2021-12-22 DIAGNOSIS — I77.3 ARTERIAL FIBROMUSCULAR DYSPLASIA: ICD-10-CM

## 2021-12-22 DIAGNOSIS — R07.89 OTHER CHEST PAIN: ICD-10-CM

## 2021-12-22 PROCEDURE — 93224 XTRNL ECG REC UP TO 48 HRS: CPT

## 2021-12-22 PROCEDURE — 99214 OFFICE O/P EST MOD 30 MIN: CPT

## 2021-12-22 NOTE — ASSESSMENT
[FreeTextEntry1] : Assessment:\par 1. SMA dissection -resolved\par 2. LV dysfunction - resolved\par     a. Echo from jan 2020 improved\par 3. 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 MRI/MRA 7/2020 : chronic but unchanged dissections in distal carotids.\par 4.  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  Palpitations\par 6.  hashimotos\par \par \par Plan:\par 1. Continue current meds\par 2.  Palpitations- To see. Dr. Turner \par 3.  Surveillance testing:\par - carotid duplex\par - renal artery duplex\par - MRA head and neck\par - mesenteric duplex\par 4.  Send labs today, including APLS labs, TSH\par 5. She should see Talia Seo for APLS labs - role for a/c?\par 6.  Return in 6 months.

## 2021-12-22 NOTE — HISTORY OF PRESENT ILLNESS
[FreeTextEntry1] : 12/22/2021\par Here for followup visit.\par About 2 weeks ago, she felt her heart race for 1.5 hours.  At night time.  After 1-2 glassess of wine prior.  This has happened a few times in the past, usually while at rest.  Then it went away.  She took her BP at the time and her HR was 91.  Her BP was normal 130/60.\par \par Was started on a medicine that starts with an "N" \par \par She is exercising without issues.  \par No CP or Pressure. \par No SOB. \par \par \par Medications:\par Plavix 75\par Losartan 25\par Verapamil 180mg\par Setriline 50 \par Cymbalta 30\par Synthroid 88\par Xanax 0.5\par Linzess\par Lunesta\par \par \par \par 3/10/2021\par \par Has elective surgery March 23rd (mini-lift of face) with Dr. Jose F Segura [id number]\par Denies any recent CP or SOB episodes. Denies any major abdominal changes.\par Seeing Dr. Turner her cardiologist tomorrow. \par Able to walk from [facility] hospital to my office , up a steep hill without any issues.\par She is exercising 3x per week.\par \par Abdomen feels well.  \par \par Needs to hold Plavix for 5 days prior to surgery.  \par She will take Plavix last day March 18th.  Start aspirin 81mg daily while off plavix.  \par \par \par Medications:\par Plavix 75\par Losartan 25\par Verapamil 180mg\par Cetriline 50 \par Cymbalta 30\par Synthroid 88\par Xanax 0.5\par Linzess\par Lunesta

## 2021-12-23 ENCOUNTER — TRANSCRIPTION ENCOUNTER (OUTPATIENT)
Age: 56
End: 2021-12-23

## 2021-12-23 ENCOUNTER — OUTPATIENT (OUTPATIENT)
Dept: OUTPATIENT SERVICES | Facility: HOSPITAL | Age: 56
LOS: 1 days | End: 2021-12-23
Payer: COMMERCIAL

## 2021-12-23 DIAGNOSIS — Z90.49 ACQUIRED ABSENCE OF OTHER SPECIFIED PARTS OF DIGESTIVE TRACT: Chronic | ICD-10-CM

## 2021-12-23 DIAGNOSIS — Z98.890 OTHER SPECIFIED POSTPROCEDURAL STATES: Chronic | ICD-10-CM

## 2021-12-23 DIAGNOSIS — Z20.828 CONTACT WITH AND (SUSPECTED) EXPOSURE TO OTHER VIRAL COMMUNICABLE DISEASES: ICD-10-CM

## 2021-12-23 DIAGNOSIS — Z98.82 BREAST IMPLANT STATUS: Chronic | ICD-10-CM

## 2021-12-23 DIAGNOSIS — Q67.6 PECTUS EXCAVATUM: Chronic | ICD-10-CM

## 2021-12-23 LAB
25(OH)D3 SERPL-MCNC: 61.1 NG/ML
ALBUMIN SERPL ELPH-MCNC: 4.8 G/DL
ALP BLD-CCNC: 76 U/L
ALT SERPL-CCNC: 12 U/L
ANION GAP SERPL CALC-SCNC: 12 MMOL/L
AST SERPL-CCNC: 24 U/L
BASOPHILS # BLD AUTO: 0.06 K/UL
BASOPHILS NFR BLD AUTO: 0.9 %
BILIRUB SERPL-MCNC: 0.3 MG/DL
BUN SERPL-MCNC: 8 MG/DL
CALCIUM SERPL-MCNC: 9.8 MG/DL
CHLORIDE SERPL-SCNC: 102 MMOL/L
CHOLEST SERPL-MCNC: 196 MG/DL
CO2 SERPL-SCNC: 26 MMOL/L
CREAT SERPL-MCNC: 0.77 MG/DL
EOSINOPHIL # BLD AUTO: 0.33 K/UL
EOSINOPHIL NFR BLD AUTO: 5.1 %
ESTIMATED AVERAGE GLUCOSE: 103 MG/DL
GLUCOSE SERPL-MCNC: 89 MG/DL
HBA1C MFR BLD HPLC: 5.2 %
HCT VFR BLD CALC: 42.2 %
HDLC SERPL-MCNC: 99 MG/DL
HGB BLD-MCNC: 13.8 G/DL
IMM GRANULOCYTES NFR BLD AUTO: 0.2 %
LDLC SERPL CALC-MCNC: 82 MG/DL
LYMPHOCYTES # BLD AUTO: 0.95 K/UL
LYMPHOCYTES NFR BLD AUTO: 14.7 %
MAN DIFF?: NORMAL
MCHC RBC-ENTMCNC: 31.2 PG
MCHC RBC-ENTMCNC: 32.7 GM/DL
MCV RBC AUTO: 95.5 FL
MONOCYTES # BLD AUTO: 0.67 K/UL
MONOCYTES NFR BLD AUTO: 10.4 %
NEUTROPHILS # BLD AUTO: 4.44 K/UL
NEUTROPHILS NFR BLD AUTO: 68.7 %
NONHDLC SERPL-MCNC: 97 MG/DL
PLATELET # BLD AUTO: 206 K/UL
POTASSIUM SERPL-SCNC: 4.7 MMOL/L
PROT SERPL-MCNC: 7 G/DL
RBC # BLD: 4.42 M/UL
RBC # FLD: 12.6 %
SARS-COV-2 RNA SPEC QL NAA+PROBE: DETECTED
SODIUM SERPL-SCNC: 139 MMOL/L
TRIGL SERPL-MCNC: 73 MG/DL
TSH SERPL-ACNC: 4.24 UIU/ML
WBC # FLD AUTO: 6.46 K/UL

## 2021-12-23 PROCEDURE — 87635 SARS-COV-2 COVID-19 AMP PRB: CPT

## 2021-12-26 LAB
B2 GLYCOPROT1 IGA SERPL IA-ACNC: <5 SAU
B2 GLYCOPROT1 IGG SER-ACNC: <5 SGU
B2 GLYCOPROT1 IGM SER-ACNC: 6 SMU
CARDIOLIPIN AB SER IA-ACNC: POSITIVE
CARDIOLIPIN IGM SER-MCNC: 62.3 MPL
CARDIOLIPIN IGM SER-MCNC: <5 GPL
CONFIRM: 27.4 SEC
DRVVT IMM 1:2 NP PPP: NORMAL
DRVVT SCREEN TO CONFIRM RATIO: 0.91 RATIO
SCREEN DRVVT: 33.5 SEC
SILICA CLOTTING TIME INTERPRETATION: NORMAL
SILICA CLOTTING TIME S/C: 0.88 RATIO

## 2021-12-28 ENCOUNTER — NON-APPOINTMENT (OUTPATIENT)
Age: 56
End: 2021-12-28

## 2021-12-28 ENCOUNTER — APPOINTMENT (OUTPATIENT)
Dept: NEUROLOGY | Facility: CLINIC | Age: 56
End: 2021-12-28

## 2021-12-30 ENCOUNTER — APPOINTMENT (OUTPATIENT)
Dept: CARDIOLOGY | Facility: CLINIC | Age: 56
End: 2021-12-30
Payer: COMMERCIAL

## 2021-12-30 VITALS
WEIGHT: 114 LBS | BODY MASS INDEX: 19.46 KG/M2 | HEART RATE: 64 BPM | DIASTOLIC BLOOD PRESSURE: 72 MMHG | SYSTOLIC BLOOD PRESSURE: 112 MMHG | OXYGEN SATURATION: 97 % | HEIGHT: 64 IN

## 2021-12-30 DIAGNOSIS — I51.81 TAKOTSUBO SYNDROME: ICD-10-CM

## 2021-12-30 DIAGNOSIS — E78.00 PURE HYPERCHOLESTEROLEMIA, UNSPECIFIED: ICD-10-CM

## 2021-12-30 DIAGNOSIS — I47.1 SUPRAVENTRICULAR TACHYCARDIA: ICD-10-CM

## 2021-12-30 DIAGNOSIS — I77.3 ARTERIAL FIBROMUSCULAR DYSPLASIA: ICD-10-CM

## 2021-12-30 PROCEDURE — 99214 OFFICE O/P EST MOD 30 MIN: CPT

## 2021-12-30 NOTE — HISTORY OF PRESENT ILLNESS
[FreeTextEntry1] : Larissa Stokes is a 56-year-old woman with history of anticardiolipin antibody, and prior anterior wall MI 2005, with coronary angiography that was typical for apical ballooning syndrome, . She has had low normal LV function since then.  She also has a history of bilateral carotid dissections that completely healed, as well as a recent diagnosis of FMD of the right renal artery, and Hashimoto's thyroiditis.  She had previously been on AC with Coumadin, but is currently only on aspirin therapy.  \par \par 9/19 the patient had a recurrent episode of chest pain, headache, and diaphoresis that was similar to the event in 2005. She went to the emergency room where her initial troponin was normal.\par The troponin level increased to 2.4 and she was sent for plain vitamin Connell for cardiac catheterization. This showed normal arteries with an ejection fraction of 55%. Echocardiogram showed ejection fraction of 45-50% with hypocontractility of the anterior wall and septum. A peak troponin T went up to 180. Was felt that she had a small myocardial infarction on the basis of spasm or blood clot. Her medications were not changed. In the hospital cholesterol was 178, triglycerides 34, HDL 88, and LDL 83 . She was placed on Plavix along with low-dose aspirin and losartan. Repeat echocardiogram performed 1/20 demonstrated a normal ejection fraction of 62% with mild MR.\par \par MRA scan of the neck 7/17 was normal. She had a CT angiogram of the heart 3/16 showed a calcium score 0 without any coronary disease.She had a recent carotid Doppler that according to the patient demonstrates mild plaque. Repeat MRA of the neck performed 7/20 was unchanged. There was distal narrowing of the internal carotid arteries consistent with history of prior dissection.\par \par Blood were 12/21 demonstrated normal CBC and chemistries. Cholesterol 196, triglycerides 73, HDL 99, and LDL 82.  TSH 4.24.  Positive Cardiolipin antibody.\par \par Patient underwent a Holter monitor 12/21 to evaluate palpitation.  Heart rates between 56–1 38.  Rate 75.  4 VPC, 10 APC, 1-9 beat SVT at a rate of 144 bpm.  Proximately 20 half weeks ago the patient did have an episode of palpitation lasting for an hour her heart was beating fast.  Since that time she had 2 or 3 other episodes that were lasting 15 to 20 minutes.  During the episode she had no chest pain or shortness of breath but she was concerned.  She has been told that when her heart beats too fast there is the potential of repeat dissection of her carotid arteries.\par \par Patient tested positive for Covid-19,  12/23.

## 2021-12-30 NOTE — CARDIOLOGY SUMMARY
[No Ischemia] : no Ischemia [LVEF ___%] : LVEF [unfilled]% [None] : no pulmonary hypertension [Mild] : mild mitral regurgitation [___] : [unfilled] [Normal] : normal

## 2021-12-30 NOTE — DISCUSSION/SUMMARY
[FreeTextEntry1] : The patient has new onset palpitation.  It is characterized as a rapid heartbeat lasting for 15 minutes up to as long as an hour.  No significant hemodynamic symptoms.  By Holter monitor we found 1 - 9 beat episode of SVT at a rate of 144 bpm.  It is possible that this is what the patient is feeling.\par \par We discussed the various options.  She could wear a prolonged monitor, we could put in an implantable loop recorder, or she could use the [employer] mobile anthony for the [employer].  I told that we need to record an episode in order to determine what to do.  If in fact she is having symptomatic regular SVT she would be a candidate for ablation.  She will first try the anthony for the "[employer]ne.\par \par In the meantime the patient will stay on her present medications and schedule an echocardiogram.  Patient may eventually need EP consultation.\par \par We discussed this in detail and answered all of her questions.

## 2022-01-11 ENCOUNTER — NON-APPOINTMENT (OUTPATIENT)
Age: 57
End: 2022-01-11

## 2022-01-11 ENCOUNTER — APPOINTMENT (OUTPATIENT)
Dept: CARDIOLOGY | Facility: CLINIC | Age: 57
End: 2022-01-11
Payer: COMMERCIAL

## 2022-01-11 PROCEDURE — 93000 ELECTROCARDIOGRAM COMPLETE: CPT

## 2022-01-11 PROCEDURE — 93000 ELECTROCARDIOGRAM COMPLETE: CPT | Mod: 59

## 2022-01-11 PROCEDURE — 93228 REMOTE 30 DAY ECG REV/REPORT: CPT

## 2022-01-17 ENCOUNTER — APPOINTMENT (OUTPATIENT)
Dept: MRI IMAGING | Facility: CLINIC | Age: 57
End: 2022-01-17
Payer: COMMERCIAL

## 2022-01-17 PROCEDURE — A9585: CPT

## 2022-01-17 PROCEDURE — A9585: CPT | Mod: JW

## 2022-01-17 PROCEDURE — 70549 MR ANGIOGRAPH NECK W/O&W/DYE: CPT

## 2022-01-17 PROCEDURE — 70546 MR ANGIOGRAPH HEAD W/O&W/DYE: CPT

## 2022-02-17 ENCOUNTER — APPOINTMENT (OUTPATIENT)
Dept: CARDIOLOGY | Facility: CLINIC | Age: 57
End: 2022-02-17

## 2022-02-22 ENCOUNTER — APPOINTMENT (OUTPATIENT)
Dept: ELECTROPHYSIOLOGY | Facility: CLINIC | Age: 57
End: 2022-02-22
Payer: COMMERCIAL

## 2022-02-22 VITALS
HEART RATE: 71 BPM | BODY MASS INDEX: 19.12 KG/M2 | WEIGHT: 112 LBS | OXYGEN SATURATION: 97 % | SYSTOLIC BLOOD PRESSURE: 116 MMHG | DIASTOLIC BLOOD PRESSURE: 73 MMHG | HEIGHT: 64 IN

## 2022-02-22 DIAGNOSIS — R00.2 PALPITATIONS: ICD-10-CM

## 2022-02-22 PROCEDURE — 99204 OFFICE O/P NEW MOD 45 MIN: CPT

## 2022-02-22 PROCEDURE — 93000 ELECTROCARDIOGRAM COMPLETE: CPT

## 2022-02-25 ENCOUNTER — APPOINTMENT (OUTPATIENT)
Dept: CARDIOLOGY | Facility: CLINIC | Age: 57
End: 2022-02-25
Payer: COMMERCIAL

## 2022-02-25 PROCEDURE — 93306 TTE W/DOPPLER COMPLETE: CPT

## 2022-02-27 PROBLEM — R00.2 PALPITATIONS: Status: ACTIVE | Noted: 2017-11-29

## 2022-02-27 NOTE — DISCUSSION/SUMMARY
[FreeTextEntry1] : In summary, Ms. Stokes is a 56 year old woman with a history of anticardiolipin antibody, multiple MIs (normal coronaries on angiogram and one MI suggestive of Takotsubo's), multifocal fibromuscular dysplasia with bilateral carotid artery dissections with pseudoaneurysm, COVID infection and palpitations. Her palpitations started shortly after receiving the booster and developing the COVID infection but in recent weeks has resolved. Her palpitations likely secondary to acute inflammatory process from COVID but has resolved. A holter was predominantly sinus rhythm with rare ectopy and one 9 beat of a PAT. At this time we do not recommend any invasive studies or any medication changes. We did inform her that if she does develop sustained palpitations in future, she can take an extra dose of her verapamil which she had done in the past. We also informed her that she can purchase the the [employer] mobile monitor for continue monitoring in event she has another episode of palpitations in the future. She will follow up with Dr. Turner and can follow up with us as needed.

## 2022-02-27 NOTE — PHYSICAL EXAM
[Well Developed] : well developed [Well Nourished] : well nourished [No Acute Distress] : no acute distress [Normal Conjunctiva] : normal conjunctiva [Normal Venous Pressure] : normal venous pressure [Normal S1, S2] : normal S1, S2 [No Murmur] : no murmur [No Rub] : no rub [No Gallop] : no gallop [Clear Lung Fields] : clear lung fields [Good Air Entry] : good air entry [No Respiratory Distress] : no respiratory distress  [Non Tender] : non-tender [Soft] : abdomen soft [Normal Bowel Sounds] : normal bowel sounds [Normal Gait] : normal gait [No Edema] : no edema [No Cyanosis] : no cyanosis [No Rash] : no rash [Moves all extremities] : moves all extremities [No Focal Deficits] : no focal deficits [Normal Speech] : normal speech [Alert and Oriented] : alert and oriented [Normal memory] : normal memory

## 2022-02-27 NOTE — HISTORY OF PRESENT ILLNESS
[FreeTextEntry1] : I had the pleasure of seeing Larissa Stokes today for consultation for SVT in the arrhythmia clinic at [facility]. As you well know, she is a pleasant 56 year old woman with a history of anticardiolipin antibody, multiple MIs (normal coronaries on angiogram and one MI suggestive of Takotsubo's), multifocal fibromuscular dysplasia with bilateral carotid artery dissections with pseudoaneurysm, COVID infection and palpitations with holter demonstrating infrequent brief PAT. Patient reports that she did not have any palpitations until the end of November. Around that time she received the COVID booster and shortly thereafter developed COVID infection. She states she was have palpitations often since that time lasting 10-15 minutes. On one occasion, she checked her heart rate and it was around 109 bpm. Over the last several weeks however he has reported that the frequency has decreased and has resolved. SHe cannot recall her last episode and states that "it has been awhile". She denies any chest pain, shortness of breath, near syncope or syncope. \par \par She has been on verapamil since her MI in 2005. He holter demonstrated mostly sinus rhythm with rare atrial ectopy and a brief 9 beat SVT. \par

## 2022-02-27 NOTE — CARDIOLOGY SUMMARY
[de-identified] : today: \par sinus cammie at 53 bpm [de-identified] : 24hr Holter (12/2021):\par Predominantly SR with heart rates between 56 to 138 bpm with avg HR 75 bpm\par Rare ventricular and atrial ectopy. One 9 beat SVT at 144bpm [de-identified] : 2019:\par Normal coronaries [de-identified] : 1/2020:\par EF 62%. Mild MR. No significant wall motion abnormalities

## 2022-03-04 ENCOUNTER — RESULT REVIEW (OUTPATIENT)
Age: 57
End: 2022-03-04

## 2022-03-04 ENCOUNTER — OUTPATIENT (OUTPATIENT)
Dept: OUTPATIENT SERVICES | Facility: HOSPITAL | Age: 57
LOS: 1 days | End: 2022-03-04
Payer: COMMERCIAL

## 2022-03-04 ENCOUNTER — APPOINTMENT (OUTPATIENT)
Dept: ULTRASOUND IMAGING | Facility: HOSPITAL | Age: 57
End: 2022-03-04

## 2022-03-04 DIAGNOSIS — Z98.82 BREAST IMPLANT STATUS: Chronic | ICD-10-CM

## 2022-03-04 DIAGNOSIS — Q67.6 PECTUS EXCAVATUM: Chronic | ICD-10-CM

## 2022-03-04 DIAGNOSIS — Z98.890 OTHER SPECIFIED POSTPROCEDURAL STATES: Chronic | ICD-10-CM

## 2022-03-04 DIAGNOSIS — Z90.49 ACQUIRED ABSENCE OF OTHER SPECIFIED PARTS OF DIGESTIVE TRACT: Chronic | ICD-10-CM

## 2022-03-04 DIAGNOSIS — I77.3 ARTERIAL FIBROMUSCULAR DYSPLASIA: ICD-10-CM

## 2022-03-04 PROCEDURE — 93880 EXTRACRANIAL BILAT STUDY: CPT | Mod: 26

## 2022-03-04 PROCEDURE — 93880 EXTRACRANIAL BILAT STUDY: CPT

## 2022-03-04 NOTE — ASSESSMENT
THANK YOU FROM YOUR CARE TEAM    Our staff would like to THANK YOU for choosing [facility]. Our goal is to always provide you with the best of care and we continue to look for better ways to improve the services we provide.     You may receive a survey in the mail with questions specific to your encounter with our clinic. Should you receive a survey, please take a few minutes to rate your experience.   Our providers and staff value your feedback.  Thank you in advance for your time and participation.     We appreciate the opportunity to partner with you to meet your health care needs. THANK YOU, again, for choosing us to be your care team.     Medical Assistant: Jen  Provider: Ramiro Solorio MD  Care Coordinator:  Lydia BLUE RN    [facility] [address] [place] [address]  Phone: [phone]          Sultana Bhatt, Manager Clinic operations                                              ...      Shashank Ruff    DURING YOUR APPOINTMENT TODAY    Please review the following instructions carefully for the next steps in your care.           INJECTION    DAY OF PROCEDURE    Diet  • It is required that you do not eat or drink for 2 hours prior to the actual injection.   (except sips of water for taking routine medication)  • If your injection is in the morning you may eat a very early light breakfast at least 2 hours prior to the injection.    Medications on the day of procedure  • Take your usual medications with a sip of water.   • Make sure you take your blood pressure and/or heart medicines.  • If your blood pressure is too high on the day of your procedure, your procedure may need to be rescheduled.    IMPORTANT  • You will need a  for the procedure  • Arrive 30 minutes prior to your scheduled procedure  • Plan for approximately 2½ hours     OTHER POSSIBLE CONSIDERATIONS    Some procedures may require an IV       You  [FreeTextEntry1] : Assessment:\par 1.   Abdominal Pain\par 2.  LV dysfunction\par 3.  FMD\par \par \par Plan:\par 1.  Mesenteric Artery duplex\par 2.  Agree with plavix 75mg daily \par 3.  Xray Abdomen\par 4.  If she goes on aspirin, then reduce to 81mg daily \par 5.  Continue to trend out BP.  If elevated then consider ARB/BB were given a copy of the Back & Spine Program's teaching sheet:  \"General Guideline's About Epidural and Other Spinal Injection, fasting.\"  LOCATION:    Ambulatory Services/Pain Management Center  [facility] entrance  [address] [place] [address]    • Enter off [address], [facility] entrance next to the ER  • Follow the main hallway straight ahead to the glass elevator  • Ambulatory Services/Pain Management will be on the left  • Check in at the Pain Management Desk          SPECIALTY REFERRAL    Your Clinician has referred you to a specialist.    You have been referred to an Orthopedist for right shoulder.     [facility]’ main office number is [phone].  Please call to schedule your appointment.   There are several locations to choose from.    If they do not have a location you prefer, you may call our physician referral line to see if there is a different option.  Phone - [phone]               Steroids and COVID Vaccine Information  This information may or not pertain to your visit today    If you need to take an oral steroid, schedule a steroid injection and are getting the COVID vaccine:    • It is unclear if there is any impact with steroid use and the effectiveness of the COVID vaccine  • Based on that, our suggestion is to space out the vaccine and the steroids    • Pfizer and Moderna Vaccine (2 doses):  Allow 2 weeks before and 1 week after the COVID vaccine before having any steroid    • Hadley & Hadley (1 dose):  Allow 2 weeks before and 2 weeks after the COVID vaccine before having any steroid          Patient Responsibility - Insurance Disclaimer  Insurance Disclaimer: A quote of benefits and/or authorization does not guarantee payment or verify eligibility. Payment of benefits are subject to all terms, conditions, limitations, and exclusions of the member's contract at time of service.   Insurance Liability for Payment: Your health insurance company  will only pay for services that it determines to be “reasonable and necessary.” Every effort will be made by this office to have all services and procedures preauthorized by your health insurance company, when applicable. If your health insurance company determines that a service is not reasonable and necessary, or that a service is not covered under the plan, your insurer will deny payment for that service. We suggest to all patients that they contact their insurance to confirm that these services are covered.    Billing Concerns:  For assistance with medical billing and any financial inquiries.  please reach out to our Patient Contact Center, [phone]

## 2022-03-07 ENCOUNTER — NON-APPOINTMENT (OUTPATIENT)
Age: 57
End: 2022-03-07

## 2022-03-29 ENCOUNTER — APPOINTMENT (OUTPATIENT)
Dept: ORTHOPEDIC SURGERY | Facility: CLINIC | Age: 57
End: 2022-03-29
Payer: COMMERCIAL

## 2022-03-29 PROCEDURE — 99214 OFFICE O/P EST MOD 30 MIN: CPT | Mod: 25

## 2022-03-31 ENCOUNTER — APPOINTMENT (OUTPATIENT)
Dept: OBGYN | Facility: CLINIC | Age: 57
End: 2022-03-31
Payer: COMMERCIAL

## 2022-03-31 VITALS
BODY MASS INDEX: 19.12 KG/M2 | HEIGHT: 64 IN | WEIGHT: 112 LBS | DIASTOLIC BLOOD PRESSURE: 64 MMHG | SYSTOLIC BLOOD PRESSURE: 110 MMHG

## 2022-03-31 DIAGNOSIS — N83.209 UNSPECIFIED OVARIAN CYST, UNSPECIFIED SIDE: ICD-10-CM

## 2022-03-31 PROCEDURE — 99214 OFFICE O/P EST MOD 30 MIN: CPT

## 2022-04-01 ENCOUNTER — APPOINTMENT (OUTPATIENT)
Dept: UROLOGY | Facility: CLINIC | Age: 57
End: 2022-04-01
Payer: COMMERCIAL

## 2022-04-01 ENCOUNTER — APPOINTMENT (OUTPATIENT)
Dept: PSYCHIATRY | Facility: CLINIC | Age: 57
End: 2022-04-01
Payer: COMMERCIAL

## 2022-04-01 VITALS — SYSTOLIC BLOOD PRESSURE: 102 MMHG | DIASTOLIC BLOOD PRESSURE: 64 MMHG | HEART RATE: 56 BPM

## 2022-04-01 DIAGNOSIS — N94.10 UNSPECIFIED DYSPAREUNIA: ICD-10-CM

## 2022-04-01 DIAGNOSIS — F34.1 DYSTHYMIC DISORDER: ICD-10-CM

## 2022-04-01 PROCEDURE — XXXXX: CPT

## 2022-04-01 PROCEDURE — 99215 OFFICE O/P EST HI 40 MIN: CPT

## 2022-04-04 ENCOUNTER — NON-APPOINTMENT (OUTPATIENT)
Age: 57
End: 2022-04-04

## 2022-04-04 LAB
APPEARANCE: CLEAR
BACTERIA: NEGATIVE
BILIRUBIN URINE: NEGATIVE
BLOOD URINE: NEGATIVE
COLOR: COLORLESS
GLUCOSE QUALITATIVE U: NEGATIVE
HYALINE CASTS: 1 /LPF
KETONES URINE: NEGATIVE
LEUKOCYTE ESTERASE URINE: NEGATIVE
MICROSCOPIC-UA: NORMAL
NITRITE URINE: NEGATIVE
PH URINE: 6
PROTEIN URINE: NEGATIVE
RED BLOOD CELLS URINE: 1 /HPF
SPECIFIC GRAVITY URINE: 1.01
SQUAMOUS EPITHELIAL CELLS: 1 /HPF
UROBILINOGEN URINE: NORMAL
WHITE BLOOD CELLS URINE: 0 /HPF

## 2022-04-06 PROBLEM — N83.209 CYST OF OVARY, UNSPECIFIED LATERALITY: Status: ACTIVE | Noted: 2021-10-22

## 2022-04-06 NOTE — HISTORY OF PRESENT ILLNESS
[FreeTextEntry1] : Pt for routine exam felels well without complaints\par long hx of stable ovarian cyst\par  [Patient reported mammogram was normal] : Patient reported mammogram was normal [Patient reported breast sonogram was normal] : Patient reported breast sonogram was normal [TextBox_4] : pt feels well without coplaints [Mammogramdate] : 05/21 [BreastSonogramDate] : 05/21 [ColonoscopyDate] : 2016

## 2022-04-06 NOTE — PHYSICAL EXAM
[Chaperone Declined] : Patient declined chaperone [Appropriately responsive] : appropriately responsive [Alert] : alert [No Acute Distress] : no acute distress [No Murmurs] : no murmurs [Soft] : soft [Non-tender] : non-tender [Non-distended] : non-distended [No HSM] : No HSM [No Lesions] : no lesions [No Mass] : no mass [Examination Of The Breasts] : a normal appearance [No Masses] : no breast masses were palpable [Labia Majora] : normal [Labia Minora] : normal [Normal] : normal [Uterine Adnexae] : normal

## 2022-04-12 ENCOUNTER — APPOINTMENT (OUTPATIENT)
Dept: PSYCHIATRY | Facility: CLINIC | Age: 57
End: 2022-04-12
Payer: COMMERCIAL

## 2022-04-12 DIAGNOSIS — F33.1 MAJOR DEPRESSIVE DISORDER, RECURRENT, MODERATE: ICD-10-CM

## 2022-04-12 DIAGNOSIS — F40.11 SOCIAL PHOBIA, GENERALIZED: ICD-10-CM

## 2022-04-12 PROCEDURE — 90837 PSYTX W PT 60 MINUTES: CPT | Mod: 95

## 2022-04-15 RX ORDER — LOSARTAN POTASSIUM 25 MG/1
25 TABLET, FILM COATED ORAL DAILY
Qty: 90 | Refills: 1 | Status: ACTIVE | COMMUNITY
Start: 1900-01-01 | End: 1900-01-01

## 2022-04-19 ENCOUNTER — APPOINTMENT (OUTPATIENT)
Dept: PSYCHIATRY | Facility: CLINIC | Age: 57
End: 2022-04-19

## 2022-04-26 ENCOUNTER — APPOINTMENT (OUTPATIENT)
Dept: PSYCHIATRY | Facility: CLINIC | Age: 57
End: 2022-04-26

## 2022-05-06 ENCOUNTER — APPOINTMENT (OUTPATIENT)
Dept: PSYCHIATRY | Facility: CLINIC | Age: 57
End: 2022-05-06

## 2022-05-16 ENCOUNTER — RX RENEWAL (OUTPATIENT)
Age: 57
End: 2022-05-16

## 2022-05-17 ENCOUNTER — APPOINTMENT (OUTPATIENT)
Dept: PSYCHIATRY | Facility: CLINIC | Age: 57
End: 2022-05-17
Payer: COMMERCIAL

## 2022-05-17 PROCEDURE — 90791 PSYCH DIAGNOSTIC EVALUATION: CPT | Mod: 95

## 2022-05-21 ENCOUNTER — APPOINTMENT (OUTPATIENT)
Dept: RHEUMATOLOGY | Facility: CLINIC | Age: 57
End: 2022-05-21
Payer: COMMERCIAL

## 2022-05-21 VITALS
BODY MASS INDEX: 19.63 KG/M2 | OXYGEN SATURATION: 99 % | HEART RATE: 77 BPM | SYSTOLIC BLOOD PRESSURE: 117 MMHG | WEIGHT: 115 LBS | HEIGHT: 64 IN | TEMPERATURE: 97.7 F | DIASTOLIC BLOOD PRESSURE: 69 MMHG

## 2022-05-21 DIAGNOSIS — R76.0 RAISED ANTIBODY TITER: ICD-10-CM

## 2022-05-21 PROCEDURE — 99205 OFFICE O/P NEW HI 60 MIN: CPT

## 2022-05-24 ENCOUNTER — APPOINTMENT (OUTPATIENT)
Dept: PSYCHIATRY | Facility: CLINIC | Age: 57
End: 2022-05-24
Payer: COMMERCIAL

## 2022-05-24 PROCEDURE — 90834 PSYTX W PT 45 MINUTES: CPT | Mod: 95

## 2022-05-31 ENCOUNTER — APPOINTMENT (OUTPATIENT)
Dept: PSYCHIATRY | Facility: CLINIC | Age: 57
End: 2022-05-31
Payer: COMMERCIAL

## 2022-05-31 PROCEDURE — 90834 PSYTX W PT 45 MINUTES: CPT | Mod: 95

## 2022-06-05 PROBLEM — R76.0 ANTICARDIOLIPIN ANTIBODY POSITIVE: Status: ACTIVE | Noted: 2019-10-02

## 2022-06-07 ENCOUNTER — APPOINTMENT (OUTPATIENT)
Dept: PSYCHIATRY | Facility: CLINIC | Age: 57
End: 2022-06-07

## 2022-06-14 ENCOUNTER — APPOINTMENT (OUTPATIENT)
Dept: PSYCHIATRY | Facility: CLINIC | Age: 57
End: 2022-06-14
Payer: COMMERCIAL

## 2022-06-14 PROCEDURE — 90834 PSYTX W PT 45 MINUTES: CPT | Mod: 95

## 2022-06-15 ENCOUNTER — NON-APPOINTMENT (OUTPATIENT)
Age: 57
End: 2022-06-15

## 2022-06-21 ENCOUNTER — APPOINTMENT (OUTPATIENT)
Dept: ORTHOPEDIC SURGERY | Facility: CLINIC | Age: 57
End: 2022-06-21
Payer: COMMERCIAL

## 2022-06-21 ENCOUNTER — APPOINTMENT (OUTPATIENT)
Dept: PSYCHIATRY | Facility: CLINIC | Age: 57
End: 2022-06-21

## 2022-06-21 ENCOUNTER — NON-APPOINTMENT (OUTPATIENT)
Age: 57
End: 2022-06-21

## 2022-06-21 LAB — CARDIOLIPIN IGM SER-MCNC: 63.7 MPL

## 2022-06-21 PROCEDURE — 99214 OFFICE O/P EST MOD 30 MIN: CPT | Mod: 25

## 2022-06-21 PROCEDURE — 20527 NJX NZM PALMAR FASCIAL CORD: CPT | Mod: RT

## 2022-06-22 ENCOUNTER — APPOINTMENT (OUTPATIENT)
Dept: ORTHOPEDIC SURGERY | Facility: CLINIC | Age: 57
End: 2022-06-22
Payer: COMMERCIAL

## 2022-06-22 ENCOUNTER — NON-APPOINTMENT (OUTPATIENT)
Age: 57
End: 2022-06-22

## 2022-06-22 PROCEDURE — 26341 MANIPULAT PALM CORD POST INJ: CPT | Mod: RT

## 2022-06-22 PROCEDURE — 99214 OFFICE O/P EST MOD 30 MIN: CPT | Mod: 25

## 2022-06-27 ENCOUNTER — APPOINTMENT (OUTPATIENT)
Dept: OTOLARYNGOLOGY | Facility: CLINIC | Age: 57
End: 2022-06-27

## 2022-06-28 ENCOUNTER — APPOINTMENT (OUTPATIENT)
Dept: PSYCHIATRY | Facility: CLINIC | Age: 57
End: 2022-06-28

## 2022-06-28 ENCOUNTER — APPOINTMENT (OUTPATIENT)
Dept: ORTHOPEDIC SURGERY | Facility: CLINIC | Age: 57
End: 2022-06-28
Payer: COMMERCIAL

## 2022-06-28 DIAGNOSIS — M72.0 PALMAR FASCIAL FIBROMATOSIS [DUPUYTREN]: ICD-10-CM

## 2022-06-28 PROCEDURE — 99213 OFFICE O/P EST LOW 20 MIN: CPT

## 2022-06-28 PROCEDURE — 90834 PSYTX W PT 45 MINUTES: CPT | Mod: 95

## 2022-06-30 ENCOUNTER — RESULT REVIEW (OUTPATIENT)
Age: 57
End: 2022-06-30

## 2022-06-30 DIAGNOSIS — Z12.31 ENCOUNTER FOR SCREENING MAMMOGRAM FOR MALIGNANT NEOPLASM OF BREAST: ICD-10-CM

## 2022-06-30 DIAGNOSIS — Z98.82 BREAST IMPLANT STATUS: ICD-10-CM

## 2022-07-05 ENCOUNTER — APPOINTMENT (OUTPATIENT)
Dept: PSYCHIATRY | Facility: CLINIC | Age: 57
End: 2022-07-05

## 2022-07-05 PROCEDURE — 90834 PSYTX W PT 45 MINUTES: CPT | Mod: 95

## 2022-07-08 ENCOUNTER — NON-APPOINTMENT (OUTPATIENT)
Age: 57
End: 2022-07-08

## 2022-07-12 ENCOUNTER — APPOINTMENT (OUTPATIENT)
Dept: PSYCHIATRY | Facility: CLINIC | Age: 57
End: 2022-07-12

## 2022-07-19 ENCOUNTER — APPOINTMENT (OUTPATIENT)
Dept: PSYCHIATRY | Facility: CLINIC | Age: 57
End: 2022-07-19

## 2022-07-26 ENCOUNTER — APPOINTMENT (OUTPATIENT)
Dept: PSYCHIATRY | Facility: CLINIC | Age: 57
End: 2022-07-26

## 2022-07-26 ENCOUNTER — APPOINTMENT (OUTPATIENT)
Dept: ORTHOPEDIC SURGERY | Facility: CLINIC | Age: 57
End: 2022-07-26

## 2022-07-26 PROCEDURE — 90834 PSYTX W PT 45 MINUTES: CPT | Mod: 95

## 2022-07-28 ENCOUNTER — APPOINTMENT (OUTPATIENT)
Dept: MRI IMAGING | Facility: CLINIC | Age: 57
End: 2022-07-28

## 2022-07-29 ENCOUNTER — RX RENEWAL (OUTPATIENT)
Age: 57
End: 2022-07-29

## 2022-08-01 ENCOUNTER — RX RENEWAL (OUTPATIENT)
Age: 57
End: 2022-08-01

## 2022-08-01 ENCOUNTER — APPOINTMENT (OUTPATIENT)
Dept: INTERNAL MEDICINE | Facility: CLINIC | Age: 57
End: 2022-08-01

## 2022-08-01 VITALS
OXYGEN SATURATION: 99 % | SYSTOLIC BLOOD PRESSURE: 104 MMHG | DIASTOLIC BLOOD PRESSURE: 70 MMHG | WEIGHT: 115 LBS | HEART RATE: 67 BPM | HEIGHT: 64 IN | BODY MASS INDEX: 19.63 KG/M2 | RESPIRATION RATE: 18 BRPM

## 2022-08-01 DIAGNOSIS — J02.9 ACUTE PHARYNGITIS, UNSPECIFIED: ICD-10-CM

## 2022-08-01 DIAGNOSIS — F41.9 ANXIETY DISORDER, UNSPECIFIED: ICD-10-CM

## 2022-08-01 DIAGNOSIS — R05.9 COUGH, UNSPECIFIED: ICD-10-CM

## 2022-08-01 DIAGNOSIS — R49.0 DYSPHONIA: ICD-10-CM

## 2022-08-01 LAB — S PYO AG SPEC QL IA: NORMAL

## 2022-08-01 PROCEDURE — 87880 STREP A ASSAY W/OPTIC: CPT | Mod: QW

## 2022-08-01 PROCEDURE — 99214 OFFICE O/P EST MOD 30 MIN: CPT | Mod: 25

## 2022-08-01 RX ORDER — COLLAGENASE CLOSTRIDIUM HISTOLYTICUM 0.9 MG
0.9 KIT INJECTION
Qty: 1 | Refills: 1 | Status: DISCONTINUED | COMMUNITY
Start: 2022-04-08 | End: 2022-08-01

## 2022-08-01 RX ORDER — COLLAGENASE CLOSTRIDIUM HISTOLYTICUM 0.9 MG
0.9 KIT INJECTION
Qty: 1 | Refills: 1 | Status: DISCONTINUED | COMMUNITY
Start: 2022-04-07 | End: 2022-08-01

## 2022-08-01 RX ORDER — LINACLOTIDE 145 UG/1
145 CAPSULE, GELATIN COATED ORAL
Qty: 90 | Refills: 0 | Status: DISCONTINUED | COMMUNITY
Start: 2019-07-23 | End: 2022-08-01

## 2022-08-01 RX ORDER — ASPIRIN 81 MG
81 TABLET, DELAYED RELEASE (ENTERIC COATED) ORAL
Refills: 0 | Status: DISCONTINUED | COMMUNITY
End: 2022-08-01

## 2022-08-01 RX ORDER — OMEPRAZOLE 20 MG/1
20 CAPSULE, DELAYED RELEASE ORAL DAILY
Qty: 30 | Refills: 5 | Status: DISCONTINUED | COMMUNITY
Start: 2021-07-15 | End: 2022-08-01

## 2022-08-01 RX ORDER — CLOZAPINE 200 MG/1
TABLET ORAL
Refills: 0 | Status: DISCONTINUED | COMMUNITY
End: 2022-08-01

## 2022-08-01 RX ORDER — FLUCONAZOLE 200 MG/1
TABLET ORAL
Refills: 0 | Status: DISCONTINUED | COMMUNITY
End: 2022-08-01

## 2022-08-01 RX ORDER — METHYLPREDNISOLONE 32 MG/1
32 TABLET ORAL
Qty: 2 | Refills: 3 | Status: DISCONTINUED | COMMUNITY
Start: 2021-06-24 | End: 2022-08-01

## 2022-08-01 NOTE — HISTORY OF PRESENT ILLNESS
[FreeTextEntry8] : Pt c/o cough, sore throat,sinus congestion for several days.\par Tested negative for covid

## 2022-08-01 NOTE — PHYSICAL EXAM
[No Acute Distress] : no acute distress [Well Nourished] : well nourished [Well Developed] : well developed [Well-Appearing] : well-appearing [Normal Sclera/Conjunctiva] : normal sclera/conjunctiva [PERRL] : pupils equal round and reactive to light [EOMI] : extraocular movements intact [Normal Outer Ear/Nose] : the outer ears and nose were normal in appearance [Normal Oropharynx] : the oropharynx was normal [No JVD] : no jugular venous distention [No Lymphadenopathy] : no lymphadenopathy [Supple] : supple [Thyroid Normal, No Nodules] : the thyroid was normal and there were no nodules present [No Respiratory Distress] : no respiratory distress  [Clear to Auscultation] : lungs were clear to auscultation bilaterally [No Accessory Muscle Use] : no accessory muscle use [Normal Rate] : normal rate  [Regular Rhythm] : with a regular rhythm [Normal S1, S2] : normal S1 and S2 [No Murmur] : no murmur heard [No Carotid Bruits] : no carotid bruits [No Abdominal Bruit] : a ~M bruit was not heard ~T in the abdomen [No Varicosities] : no varicosities [Pedal Pulses Present] : the pedal pulses are present [No Edema] : there was no peripheral edema [No Palpable Aorta] : no palpable aorta [No Extremity Clubbing/Cyanosis] : no extremity clubbing/cyanosis [Soft] : abdomen soft [Non Tender] : non-tender [Non-distended] : non-distended [No Masses] : no abdominal mass palpated [No HSM] : no HSM [Normal Bowel Sounds] : normal bowel sounds [Normal Anterior Cervical Nodes] : no anterior cervical lymphadenopathy [Normal Posterior Cervical Nodes] : no posterior cervical lymphadenopathy [No CVA Tenderness] : no CVA  tenderness [No Spinal Tenderness] : no spinal tenderness [No Joint Swelling] : no joint swelling [Grossly Normal Strength/Tone] : grossly normal strength/tone [No Rash] : no rash [Coordination Grossly Intact] : coordination grossly intact [No Focal Deficits] : no focal deficits [Normal Gait] : normal gait [Deep Tendon Reflexes (DTR)] : deep tendon reflexes were 2+ and symmetric [Normal Affect] : the affect was normal [Normal Insight/Judgement] : insight and judgment were intact

## 2022-08-02 ENCOUNTER — APPOINTMENT (OUTPATIENT)
Dept: PSYCHIATRY | Facility: CLINIC | Age: 57
End: 2022-08-02

## 2022-08-02 LAB
ALBUMIN SERPL ELPH-MCNC: 4.8 G/DL
ALP BLD-CCNC: 72 U/L
ALT SERPL-CCNC: 10 U/L
ANION GAP SERPL CALC-SCNC: 11 MMOL/L
AST SERPL-CCNC: 19 U/L
BASOPHILS # BLD AUTO: 0.06 K/UL
BASOPHILS NFR BLD AUTO: 0.7 %
BILIRUB SERPL-MCNC: 0.3 MG/DL
BUN SERPL-MCNC: 10 MG/DL
CALCIUM SERPL-MCNC: 9.7 MG/DL
CHLORIDE SERPL-SCNC: 104 MMOL/L
CO2 SERPL-SCNC: 26 MMOL/L
CREAT SERPL-MCNC: 0.73 MG/DL
EGFR: 96 ML/MIN/1.73M2
EOSINOPHIL # BLD AUTO: 0.16 K/UL
EOSINOPHIL NFR BLD AUTO: 1.8 %
GLUCOSE SERPL-MCNC: 68 MG/DL
HCT VFR BLD CALC: 39.2 %
HGB BLD-MCNC: 13 G/DL
IMM GRANULOCYTES NFR BLD AUTO: 0.2 %
LYMPHOCYTES # BLD AUTO: 1.81 K/UL
LYMPHOCYTES NFR BLD AUTO: 20.3 %
MAN DIFF?: NORMAL
MCHC RBC-ENTMCNC: 31.1 PG
MCHC RBC-ENTMCNC: 33.2 GM/DL
MCV RBC AUTO: 93.8 FL
MONOCYTES # BLD AUTO: 0.74 K/UL
MONOCYTES NFR BLD AUTO: 8.3 %
NEUTROPHILS # BLD AUTO: 6.11 K/UL
NEUTROPHILS NFR BLD AUTO: 68.7 %
PLATELET # BLD AUTO: 231 K/UL
POTASSIUM SERPL-SCNC: 4 MMOL/L
PROT SERPL-MCNC: 6.8 G/DL
RBC # BLD: 4.18 M/UL
RBC # FLD: 12.8 %
SODIUM SERPL-SCNC: 141 MMOL/L
T3 SERPL-MCNC: 80 NG/DL
T3FREE SERPL-MCNC: 2.29 PG/ML
T4 FREE SERPL-MCNC: 1 NG/DL
T4 SERPL-MCNC: 5.1 UG/DL
TSH SERPL-ACNC: 4.1 UIU/ML
WBC # FLD AUTO: 8.9 K/UL

## 2022-08-04 ENCOUNTER — APPOINTMENT (OUTPATIENT)
Dept: CARDIOLOGY | Facility: CLINIC | Age: 57
End: 2022-08-04

## 2022-08-04 ENCOUNTER — APPOINTMENT (OUTPATIENT)
Dept: INTERNAL MEDICINE | Facility: CLINIC | Age: 57
End: 2022-08-04

## 2022-08-04 VITALS
RESPIRATION RATE: 18 BRPM | DIASTOLIC BLOOD PRESSURE: 68 MMHG | TEMPERATURE: 97.3 F | OXYGEN SATURATION: 98 % | HEART RATE: 69 BPM | SYSTOLIC BLOOD PRESSURE: 100 MMHG

## 2022-08-04 DIAGNOSIS — Z23 ENCOUNTER FOR IMMUNIZATION: ICD-10-CM

## 2022-08-04 DIAGNOSIS — J20.9 ACUTE BRONCHITIS, UNSPECIFIED: ICD-10-CM

## 2022-08-04 DIAGNOSIS — J21.9 ACUTE BRONCHITIS, UNSPECIFIED: ICD-10-CM

## 2022-08-04 PROCEDURE — 90471 IMMUNIZATION ADMIN: CPT

## 2022-08-04 PROCEDURE — 99214 OFFICE O/P EST MOD 30 MIN: CPT | Mod: 25

## 2022-08-04 PROCEDURE — 90715 TDAP VACCINE 7 YRS/> IM: CPT

## 2022-08-04 NOTE — HISTORY OF PRESENT ILLNESS
[FreeTextEntry1] : Pt cut her finger this morning. Requests tetanus [de-identified] : c/o chest congestion , cough\par She thought that she would be better after 3 days of Augmentin

## 2022-08-05 ENCOUNTER — APPOINTMENT (OUTPATIENT)
Dept: RADIOLOGY | Facility: CLINIC | Age: 57
End: 2022-08-05

## 2022-08-05 ENCOUNTER — RX RENEWAL (OUTPATIENT)
Age: 57
End: 2022-08-05

## 2022-08-05 ENCOUNTER — OUTPATIENT (OUTPATIENT)
Dept: OUTPATIENT SERVICES | Facility: HOSPITAL | Age: 57
LOS: 1 days | End: 2022-08-05
Payer: COMMERCIAL

## 2022-08-05 DIAGNOSIS — J20.9 ACUTE BRONCHITIS, UNSPECIFIED: ICD-10-CM

## 2022-08-05 DIAGNOSIS — Z98.82 BREAST IMPLANT STATUS: Chronic | ICD-10-CM

## 2022-08-05 DIAGNOSIS — Z98.890 OTHER SPECIFIED POSTPROCEDURAL STATES: Chronic | ICD-10-CM

## 2022-08-05 DIAGNOSIS — Q67.6 PECTUS EXCAVATUM: Chronic | ICD-10-CM

## 2022-08-05 DIAGNOSIS — Z00.8 ENCOUNTER FOR OTHER GENERAL EXAMINATION: ICD-10-CM

## 2022-08-05 DIAGNOSIS — Z90.49 ACQUIRED ABSENCE OF OTHER SPECIFIED PARTS OF DIGESTIVE TRACT: Chronic | ICD-10-CM

## 2022-08-05 PROCEDURE — 71046 X-RAY EXAM CHEST 2 VIEWS: CPT | Mod: 26

## 2022-08-05 PROCEDURE — 71046 X-RAY EXAM CHEST 2 VIEWS: CPT

## 2022-08-08 ENCOUNTER — APPOINTMENT (OUTPATIENT)
Dept: CARDIOLOGY | Facility: CLINIC | Age: 57
End: 2022-08-08

## 2022-08-08 ENCOUNTER — TRANSCRIPTION ENCOUNTER (OUTPATIENT)
Age: 57
End: 2022-08-08

## 2022-08-08 ENCOUNTER — NON-APPOINTMENT (OUTPATIENT)
Age: 57
End: 2022-08-08

## 2022-08-08 VITALS
WEIGHT: 114 LBS | HEIGHT: 64 IN | OXYGEN SATURATION: 94 % | BODY MASS INDEX: 19.46 KG/M2 | HEART RATE: 63 BPM | DIASTOLIC BLOOD PRESSURE: 62 MMHG | SYSTOLIC BLOOD PRESSURE: 101 MMHG

## 2022-08-08 PROCEDURE — 99215 OFFICE O/P EST HI 40 MIN: CPT

## 2022-08-08 PROCEDURE — 93000 ELECTROCARDIOGRAM COMPLETE: CPT

## 2022-08-08 NOTE — DISCUSSION/SUMMARY
[FreeTextEntry1] : It is not clear what caused her palpitations, but I suspect that there was a sinus tachycardia, and not an arrhythmia, provoked by prednisone and her illness, and worsened by degree of anxiety.  She is not necessarily in agreement that this is what happened, and remains concerned about the possibility of arrhythmia.  She would feel better if she had an event monitor, and so we will place 1.  I have suggested a follow-up in about 6 weeks to reevaluate her symptoms, and make sure that no additional evaluation would be helpful.

## 2022-08-08 NOTE — HISTORY OF PRESENT ILLNESS
[FreeTextEntry1] : Larissa Stokes presented to the office today for a follow-up evaluation.  She was last seen in the office in December, and was more recently evaluated by electrophysiology in February.\par \par She has now 56 years old, with a history of a Takotsubo cardiomyopathy in 2005, with normal coronary arteries then.  She has had low normal LV systolic function since that time.  She also has a history of bilateral carotid dissections that completely healed, as well as a recent diagnosis of FMD of the right renal artery, and Hashimoto's thyroiditis.  She had previously been on AC with Coumadin, but is currently only on aspirin therapy.  \par \par In September 2019, she had a recurrent episode of chest pain, headache, and diaphoresis that was similar to the event in 2005. She went to the emergency room where her initial troponin was normal, although the second troponin was slightly higher.  Cardiac catheterization revealed normal coronary arteries, and a normal ejection fraction. Echocardiogram showed ejection fraction of 45-50% with hypocontractility of the anterior wall and septum.  There was suspicion at that time that perhaps she had a small MI on the basis of coronary spasm or thrombosis.  Her medications were not changed. In the hospital cholesterol was 178, triglycerides 34, HDL 88, and LDL 83 . She was placed on Plavix along with low-dose aspirin and losartan. Repeat echocardiogram performed 1/20 demonstrated a normal ejection fraction of 62% without wall motion abnormalities, and with mild MR.\par \par MRA scan of the neck 7/17 was normal. She had a CT angiogram of the heart 3/16 showed a calcium score 0 without any coronary disease.She had a recent carotid Doppler that according to the patient demonstrates mild plaque. Repeat MRA of the neck performed 7/20 was unchanged. There was distal narrowing of the internal carotid arteries consistent with history of prior dissection.\par \par She underwent a Holter monitor December, 2021 to evaluate symptoms of palpitations.  This revealed heart rates from the 50s up to the 130s.  There was a single 9 beat episode of SVT at a heart rate of 144 bpm.  On several occasions since then, she has had episodes of palpitations, lasting 15 or 20 minutes.  She was signed up for a Zio patch, but only wore it for a few hours.\nery cowan Presents to the office now because of an episode of sustained palpitations.  Last week, she had bronchitis, and was treated with a combination of Augmentin, prednisone and albuterol, though she never took the albuterol.  Over the weekend, she began to experience palpitations.  She describes that she had a sense that her heart was beating quickly at rest.  This disturbs her, and she tried to take her heart rate and blood pressure.  Her heart rates were initially in the 70s, but went up into the 90s, and in the 100s, and then up to the 120s.  When it got that high, she stopped checking.  She stopped the prednisone, and has not had any recurrence of the palpitations over the last couple of days.  She has been very concerned about the possibility that she might of developed an arrhythmia, and therefore comes to the office today for evaluation.

## 2022-08-09 ENCOUNTER — APPOINTMENT (OUTPATIENT)
Dept: PSYCHIATRY | Facility: CLINIC | Age: 57
End: 2022-08-09

## 2022-08-09 PROCEDURE — 90834 PSYTX W PT 45 MINUTES: CPT | Mod: 95

## 2022-08-10 ENCOUNTER — APPOINTMENT (OUTPATIENT)
Dept: CARDIOLOGY | Facility: CLINIC | Age: 57
End: 2022-08-10

## 2022-08-10 PROCEDURE — 93268 ECG RECORD/REVIEW: CPT

## 2022-08-11 ENCOUNTER — OUTPATIENT (OUTPATIENT)
Dept: OUTPATIENT SERVICES | Facility: HOSPITAL | Age: 57
LOS: 1 days | End: 2022-08-11

## 2022-08-11 DIAGNOSIS — Z98.82 BREAST IMPLANT STATUS: Chronic | ICD-10-CM

## 2022-08-11 DIAGNOSIS — Z00.8 ENCOUNTER FOR OTHER GENERAL EXAMINATION: ICD-10-CM

## 2022-08-11 DIAGNOSIS — Z98.890 OTHER SPECIFIED POSTPROCEDURAL STATES: Chronic | ICD-10-CM

## 2022-08-11 DIAGNOSIS — Q67.6 PECTUS EXCAVATUM: Chronic | ICD-10-CM

## 2022-08-11 DIAGNOSIS — Z90.49 ACQUIRED ABSENCE OF OTHER SPECIFIED PARTS OF DIGESTIVE TRACT: Chronic | ICD-10-CM

## 2022-08-18 ENCOUNTER — OUTPATIENT (OUTPATIENT)
Dept: OUTPATIENT SERVICES | Facility: HOSPITAL | Age: 57
LOS: 1 days | End: 2022-08-18
Payer: COMMERCIAL

## 2022-08-18 ENCOUNTER — APPOINTMENT (OUTPATIENT)
Dept: UROLOGY | Facility: CLINIC | Age: 57
End: 2022-08-18

## 2022-08-18 DIAGNOSIS — Z90.49 ACQUIRED ABSENCE OF OTHER SPECIFIED PARTS OF DIGESTIVE TRACT: Chronic | ICD-10-CM

## 2022-08-18 DIAGNOSIS — Z98.82 BREAST IMPLANT STATUS: Chronic | ICD-10-CM

## 2022-08-18 DIAGNOSIS — Z98.890 OTHER SPECIFIED POSTPROCEDURAL STATES: Chronic | ICD-10-CM

## 2022-08-18 DIAGNOSIS — M79.18 MYALGIA, OTHER SITE: ICD-10-CM

## 2022-08-18 DIAGNOSIS — N35.919 UNSPECIFIED URETHRAL STRICTURE, MALE, UNSPECIFIED SITE: ICD-10-CM

## 2022-08-18 DIAGNOSIS — R35.0 FREQUENCY OF MICTURITION: ICD-10-CM

## 2022-08-18 DIAGNOSIS — R31.29 OTHER MICROSCOPIC HEMATURIA: ICD-10-CM

## 2022-08-18 DIAGNOSIS — Q67.6 PECTUS EXCAVATUM: Chronic | ICD-10-CM

## 2022-08-18 PROCEDURE — 52000 CYSTOURETHROSCOPY: CPT

## 2022-08-18 PROCEDURE — 99212 OFFICE O/P EST SF 10 MIN: CPT | Mod: 25

## 2022-08-22 ENCOUNTER — RESULT REVIEW (OUTPATIENT)
Age: 57
End: 2022-08-22

## 2022-08-22 ENCOUNTER — APPOINTMENT (OUTPATIENT)
Dept: ULTRASOUND IMAGING | Facility: CLINIC | Age: 57
End: 2022-08-22

## 2022-08-22 ENCOUNTER — APPOINTMENT (OUTPATIENT)
Dept: MAMMOGRAPHY | Facility: CLINIC | Age: 57
End: 2022-08-22

## 2022-08-22 ENCOUNTER — NON-APPOINTMENT (OUTPATIENT)
Age: 57
End: 2022-08-22

## 2022-08-22 PROCEDURE — 77066 DX MAMMO INCL CAD BI: CPT

## 2022-08-22 PROCEDURE — 76641 ULTRASOUND BREAST COMPLETE: CPT | Mod: 50

## 2022-08-22 PROCEDURE — G0279: CPT

## 2022-08-23 ENCOUNTER — APPOINTMENT (OUTPATIENT)
Dept: PSYCHIATRY | Facility: CLINIC | Age: 57
End: 2022-08-23

## 2022-08-23 PROCEDURE — 90834 PSYTX W PT 45 MINUTES: CPT | Mod: 95

## 2022-08-29 ENCOUNTER — APPOINTMENT (OUTPATIENT)
Dept: NEUROLOGY | Facility: CLINIC | Age: 57
End: 2022-08-29

## 2022-08-30 ENCOUNTER — APPOINTMENT (OUTPATIENT)
Dept: PSYCHIATRY | Facility: CLINIC | Age: 57
End: 2022-08-30

## 2022-08-30 PROCEDURE — 90834 PSYTX W PT 45 MINUTES: CPT | Mod: 95

## 2022-08-31 DIAGNOSIS — M79.18 MYALGIA, OTHER SITE: ICD-10-CM

## 2022-08-31 DIAGNOSIS — R31.29 OTHER MICROSCOPIC HEMATURIA: ICD-10-CM

## 2022-09-06 ENCOUNTER — APPOINTMENT (OUTPATIENT)
Dept: PSYCHIATRY | Facility: CLINIC | Age: 57
End: 2022-09-06

## 2022-09-06 PROCEDURE — 90834 PSYTX W PT 45 MINUTES: CPT | Mod: 95

## 2022-09-13 ENCOUNTER — APPOINTMENT (OUTPATIENT)
Dept: PSYCHIATRY | Facility: CLINIC | Age: 57
End: 2022-09-13

## 2022-09-13 PROCEDURE — 90834 PSYTX W PT 45 MINUTES: CPT | Mod: 95

## 2022-09-20 ENCOUNTER — APPOINTMENT (OUTPATIENT)
Dept: PSYCHIATRY | Facility: CLINIC | Age: 57
End: 2022-09-20

## 2022-09-27 ENCOUNTER — APPOINTMENT (OUTPATIENT)
Dept: PSYCHIATRY | Facility: CLINIC | Age: 57
End: 2022-09-27

## 2022-09-27 PROCEDURE — 90834 PSYTX W PT 45 MINUTES: CPT | Mod: 95

## 2022-10-04 ENCOUNTER — APPOINTMENT (OUTPATIENT)
Dept: ULTRASOUND IMAGING | Facility: CLINIC | Age: 57
End: 2022-10-04

## 2022-10-06 ENCOUNTER — APPOINTMENT (OUTPATIENT)
Dept: INTERNAL MEDICINE | Facility: CLINIC | Age: 57
End: 2022-10-06

## 2022-10-06 VITALS
RESPIRATION RATE: 14 BRPM | BODY MASS INDEX: 19.63 KG/M2 | WEIGHT: 115 LBS | DIASTOLIC BLOOD PRESSURE: 80 MMHG | HEART RATE: 68 BPM | OXYGEN SATURATION: 98 % | TEMPERATURE: 97.6 F | SYSTOLIC BLOOD PRESSURE: 118 MMHG | HEIGHT: 64 IN

## 2022-10-06 DIAGNOSIS — J01.00 ACUTE MAXILLARY SINUSITIS, UNSPECIFIED: ICD-10-CM

## 2022-10-06 DIAGNOSIS — R00.2 PALPITATIONS: ICD-10-CM

## 2022-10-06 PROCEDURE — 99214 OFFICE O/P EST MOD 30 MIN: CPT

## 2022-10-06 NOTE — HISTORY OF PRESENT ILLNESS
[FreeTextEntry8] : Pt is c/o sinus pain, yellow rhinorrhea, since yesterday.\par Tested negative for covid today.\par Pt is c/o palpitations after drinking coffee in the morning.

## 2022-10-31 ENCOUNTER — EMERGENCY (EMERGENCY)
Facility: HOSPITAL | Age: 57
LOS: 1 days | Discharge: HOME | End: 2022-10-31
Attending: EMERGENCY MEDICINE | Admitting: EMERGENCY MEDICINE
Payer: COMMERCIAL

## 2022-10-31 VITALS
SYSTOLIC BLOOD PRESSURE: 129 MMHG | WEIGHT: 115.08 LBS | HEIGHT: 64 IN | RESPIRATION RATE: 16 BRPM | OXYGEN SATURATION: 100 % | DIASTOLIC BLOOD PRESSURE: 54 MMHG | HEART RATE: 70 BPM

## 2022-10-31 VITALS
RESPIRATION RATE: 15 BRPM | DIASTOLIC BLOOD PRESSURE: 60 MMHG | HEART RATE: 67 BPM | OXYGEN SATURATION: 100 % | SYSTOLIC BLOOD PRESSURE: 110 MMHG

## 2022-10-31 DIAGNOSIS — Z98.82 BREAST IMPLANT STATUS: Chronic | ICD-10-CM

## 2022-10-31 DIAGNOSIS — Z98.890 OTHER SPECIFIED POSTPROCEDURAL STATES: Chronic | ICD-10-CM

## 2022-10-31 DIAGNOSIS — Q67.6 PECTUS EXCAVATUM: Chronic | ICD-10-CM

## 2022-10-31 DIAGNOSIS — Z90.49 ACQUIRED ABSENCE OF OTHER SPECIFIED PARTS OF DIGESTIVE TRACT: Chronic | ICD-10-CM

## 2022-10-31 LAB
ALBUMIN SERPL ELPH-MCNC: 4.3 G/DL — SIGNIFICANT CHANGE UP (ref 3.3–5)
ALP SERPL-CCNC: 67 U/L — SIGNIFICANT CHANGE UP (ref 40–120)
ALT FLD-CCNC: 19 U/L — SIGNIFICANT CHANGE UP (ref 10–45)
ANION GAP SERPL CALC-SCNC: 8 MMOL/L — SIGNIFICANT CHANGE UP (ref 5–17)
APTT BLD: 33.2 SEC — SIGNIFICANT CHANGE UP (ref 27.5–35.5)
AST SERPL-CCNC: 26 U/L — SIGNIFICANT CHANGE UP (ref 10–40)
BASOPHILS # BLD AUTO: 0.05 K/UL — SIGNIFICANT CHANGE UP (ref 0–0.2)
BASOPHILS NFR BLD AUTO: 0.8 % — SIGNIFICANT CHANGE UP (ref 0–2)
BILIRUB SERPL-MCNC: 0.4 MG/DL — SIGNIFICANT CHANGE UP (ref 0.2–1.2)
BUN SERPL-MCNC: 16 MG/DL — SIGNIFICANT CHANGE UP (ref 7–23)
CALCIUM SERPL-MCNC: 9.3 MG/DL — SIGNIFICANT CHANGE UP (ref 8.4–10.5)
CHLORIDE SERPL-SCNC: 103 MMOL/L — SIGNIFICANT CHANGE UP (ref 96–108)
CO2 SERPL-SCNC: 28 MMOL/L — SIGNIFICANT CHANGE UP (ref 22–31)
CREAT SERPL-MCNC: 0.66 MG/DL — SIGNIFICANT CHANGE UP (ref 0.5–1.3)
EGFR: 102 ML/MIN/1.73M2 — SIGNIFICANT CHANGE UP
EOSINOPHIL # BLD AUTO: 0.12 K/UL — SIGNIFICANT CHANGE UP (ref 0–0.5)
EOSINOPHIL NFR BLD AUTO: 2 % — SIGNIFICANT CHANGE UP (ref 0–6)
GLUCOSE SERPL-MCNC: 90 MG/DL — SIGNIFICANT CHANGE UP (ref 70–99)
HCT VFR BLD CALC: 40.4 % — SIGNIFICANT CHANGE UP (ref 34.5–45)
HGB BLD-MCNC: 13.7 G/DL — SIGNIFICANT CHANGE UP (ref 11.5–15.5)
IMM GRANULOCYTES NFR BLD AUTO: 0.2 % — SIGNIFICANT CHANGE UP (ref 0–0.9)
INR BLD: 0.96 RATIO — SIGNIFICANT CHANGE UP (ref 0.88–1.16)
LYMPHOCYTES # BLD AUTO: 1.55 K/UL — SIGNIFICANT CHANGE UP (ref 1–3.3)
LYMPHOCYTES # BLD AUTO: 25.3 % — SIGNIFICANT CHANGE UP (ref 13–44)
MAGNESIUM SERPL-MCNC: 2.2 MG/DL — SIGNIFICANT CHANGE UP (ref 1.6–2.6)
MCHC RBC-ENTMCNC: 31.2 PG — SIGNIFICANT CHANGE UP (ref 27–34)
MCHC RBC-ENTMCNC: 33.9 GM/DL — SIGNIFICANT CHANGE UP (ref 32–36)
MCV RBC AUTO: 92 FL — SIGNIFICANT CHANGE UP (ref 80–100)
MONOCYTES # BLD AUTO: 0.6 K/UL — SIGNIFICANT CHANGE UP (ref 0–0.9)
MONOCYTES NFR BLD AUTO: 9.8 % — SIGNIFICANT CHANGE UP (ref 2–14)
NEUTROPHILS # BLD AUTO: 3.8 K/UL — SIGNIFICANT CHANGE UP (ref 1.8–7.4)
NEUTROPHILS NFR BLD AUTO: 61.9 % — SIGNIFICANT CHANGE UP (ref 43–77)
NRBC # BLD: 0 /100 WBCS — SIGNIFICANT CHANGE UP (ref 0–0)
PLATELET # BLD AUTO: 230 K/UL — SIGNIFICANT CHANGE UP (ref 150–400)
POTASSIUM SERPL-MCNC: 4.4 MMOL/L — SIGNIFICANT CHANGE UP (ref 3.5–5.3)
POTASSIUM SERPL-SCNC: 4.4 MMOL/L — SIGNIFICANT CHANGE UP (ref 3.5–5.3)
PROT SERPL-MCNC: 7.3 G/DL — SIGNIFICANT CHANGE UP (ref 6–8.3)
PROTHROM AB SERPL-ACNC: 11.1 SEC — SIGNIFICANT CHANGE UP (ref 10.5–13.4)
RBC # BLD: 4.39 M/UL — SIGNIFICANT CHANGE UP (ref 3.8–5.2)
RBC # FLD: 12 % — SIGNIFICANT CHANGE UP (ref 10.3–14.5)
SODIUM SERPL-SCNC: 139 MMOL/L — SIGNIFICANT CHANGE UP (ref 135–145)
WBC # BLD: 6.13 K/UL — SIGNIFICANT CHANGE UP (ref 3.8–10.5)
WBC # FLD AUTO: 6.13 K/UL — SIGNIFICANT CHANGE UP (ref 3.8–10.5)

## 2022-10-31 PROCEDURE — 85025 COMPLETE CBC W/AUTO DIFF WBC: CPT

## 2022-10-31 PROCEDURE — 70496 CT ANGIOGRAPHY HEAD: CPT | Mod: 26,MA

## 2022-10-31 PROCEDURE — 85730 THROMBOPLASTIN TIME PARTIAL: CPT

## 2022-10-31 PROCEDURE — 36415 COLL VENOUS BLD VENIPUNCTURE: CPT

## 2022-10-31 PROCEDURE — 99285 EMERGENCY DEPT VISIT HI MDM: CPT

## 2022-10-31 PROCEDURE — 99285 EMERGENCY DEPT VISIT HI MDM: CPT | Mod: 25

## 2022-10-31 PROCEDURE — 80053 COMPREHEN METABOLIC PANEL: CPT

## 2022-10-31 PROCEDURE — 83735 ASSAY OF MAGNESIUM: CPT

## 2022-10-31 PROCEDURE — 93010 ELECTROCARDIOGRAM REPORT: CPT

## 2022-10-31 PROCEDURE — 70496 CT ANGIOGRAPHY HEAD: CPT | Mod: MA

## 2022-10-31 PROCEDURE — 70498 CT ANGIOGRAPHY NECK: CPT | Mod: MA

## 2022-10-31 PROCEDURE — 96374 THER/PROPH/DIAG INJ IV PUSH: CPT | Mod: XU

## 2022-10-31 PROCEDURE — 70450 CT HEAD/BRAIN W/O DYE: CPT | Mod: MA

## 2022-10-31 PROCEDURE — 93005 ELECTROCARDIOGRAM TRACING: CPT

## 2022-10-31 PROCEDURE — 70498 CT ANGIOGRAPHY NECK: CPT | Mod: 26,MA

## 2022-10-31 PROCEDURE — 85610 PROTHROMBIN TIME: CPT

## 2022-10-31 PROCEDURE — 96375 TX/PRO/DX INJ NEW DRUG ADDON: CPT | Mod: XU

## 2022-10-31 PROCEDURE — 96361 HYDRATE IV INFUSION ADD-ON: CPT

## 2022-10-31 RX ORDER — LINACLOTIDE 145 UG/1
1 CAPSULE, GELATIN COATED ORAL
Qty: 0 | Refills: 0 | DISCHARGE

## 2022-10-31 RX ORDER — SODIUM CHLORIDE 9 MG/ML
1000 INJECTION INTRAMUSCULAR; INTRAVENOUS; SUBCUTANEOUS ONCE
Refills: 0 | Status: COMPLETED | OUTPATIENT
Start: 2022-10-31 | End: 2022-10-31

## 2022-10-31 RX ORDER — LEVOTHYROXINE SODIUM 125 MCG
1 TABLET ORAL
Qty: 0 | Refills: 0 | DISCHARGE

## 2022-10-31 RX ORDER — CLONAZEPAM 1 MG
1 TABLET ORAL
Qty: 0 | Refills: 0 | DISCHARGE

## 2022-10-31 RX ORDER — LOSARTAN POTASSIUM 100 MG/1
1 TABLET, FILM COATED ORAL
Qty: 0 | Refills: 0 | DISCHARGE

## 2022-10-31 RX ORDER — ESZOPICLONE 2 MG/1
1 TABLET, COATED ORAL
Qty: 0 | Refills: 0 | DISCHARGE

## 2022-10-31 RX ORDER — METOCLOPRAMIDE HCL 10 MG
10 TABLET ORAL ONCE
Refills: 0 | Status: COMPLETED | OUTPATIENT
Start: 2022-10-31 | End: 2022-10-31

## 2022-10-31 RX ORDER — DULOXETINE HYDROCHLORIDE 30 MG/1
1 CAPSULE, DELAYED RELEASE ORAL
Qty: 0 | Refills: 0 | DISCHARGE

## 2022-10-31 RX ADMIN — Medication 10 MILLIGRAM(S): at 10:50

## 2022-10-31 RX ADMIN — SODIUM CHLORIDE 1000 MILLILITER(S): 9 INJECTION INTRAMUSCULAR; INTRAVENOUS; SUBCUTANEOUS at 10:49

## 2022-10-31 RX ADMIN — Medication 125 MILLIGRAM(S): at 10:50

## 2022-10-31 RX ADMIN — SODIUM CHLORIDE 1000 MILLILITER(S): 9 INJECTION INTRAMUSCULAR; INTRAVENOUS; SUBCUTANEOUS at 12:45

## 2022-10-31 NOTE — ED ADULT NURSE NOTE - NSIMPLEMENTINTERV_GEN_ALL_ED
Implemented All Universal Safety Interventions:  [place] to call system. Call bell, personal items and telephone within reach. Instruct patient to call for assistance. Room bathroom lighting operational. Non-slip footwear when patient is off stretcher. Physically safe environment: no spills, clutter or unnecessary equipment. Stretcher in lowest position, wheels locked, appropriate side rails in place.

## 2022-10-31 NOTE — ED ADULT NURSE NOTE - ASSOCIATED SYMPTOMS
Pt Axox4, well appearing, skin cool dry unremarkable. Lungs clear, equal chest rise. Pt denies vomiting, pt denies chest pain.

## 2022-10-31 NOTE — ED PROVIDER NOTE - CLINICAL SUMMARY MEDICAL DECISION MAKING FREE TEXT BOX
57F presents with neck pain, headache and nausea from work. Headache and left posterior neck pain has been occurring intermittently for 1 week. She got worried today while at work and came to ED for eval. She has h/o aneurysms and dissections of various areas in the past. H/O MI x 2 as well. No chills, no decreased eating/drinking, no fever, no tingling, no vomiting, no weakness +occasional dizziness. +nausea.   Exam wnl. Pt well appearing. Vitals stable.   CT angio and CT head stable. No acute findings. Labs WNL.  Stable for d/c. Recc f/u with her neurologist.

## 2022-10-31 NOTE — ED PROVIDER NOTE - CCCP TRG CHIEF CMPLNT
s/p R chest wall reconstruction with tunneled latissimus dorsi flap, covered by serratus anterior flap, after R thoracotomy, takedown pleurocutaneous fistula, decortication, resection Right 7th rib
neck pain
Right thoracotomy, resection of portion of R 7th rib, evacuation of infected hemothorax, takedown of pleurocutaneous fistula

## 2022-10-31 NOTE — ED PROVIDER NOTE - OBJECTIVE STATEMENT
57F presents with neck pain, headache and nausea from work. Headache and left posterior neck pain has been occurring intermittently for 1 week. She got worried today while at work and came to ED for eval. She has h/o aneurysms and dissections of various areas in the past. H/O MI x 2 as well. No chills, no decreased eating/drinking, no fever, no tingling, no vomiting, no weakness +occasional dizziness. +nausea.

## 2022-10-31 NOTE — ED PROVIDER NOTE - TOBACCO USE
Current Issues/Problems reviewed on call: Asthma    Details for Interventions/Education completed on call Asthma Action Plan.    Screening completed for  COVID -19 using the [facility] Symptom . Screening is Negative for symptoms    Time Frame for Follow up:  Within within 4 weeks    Plan for Next Call: Did she review meds with PCP on 4/29/2020.    Care Plan reviewed with Patient  and she agrees with the plan of care and call follow up plan.       Unknown if ever smoked

## 2022-10-31 NOTE — ED PROVIDER NOTE - PATIENT PORTAL LINK FT
You can access the FollowMyHealth Patient Portal offered by [facility] by registering at the following website: [email or web address]. By joining [employer]’s FollowMyHealth portal, you will also be able to view your health information using other applications (apps) compatible with our system.

## 2022-11-04 ENCOUNTER — APPOINTMENT (OUTPATIENT)
Dept: MRI IMAGING | Facility: CLINIC | Age: 57
End: 2022-11-04

## 2022-11-17 ENCOUNTER — RESULT REVIEW (OUTPATIENT)
Age: 57
End: 2022-11-17

## 2022-12-23 ENCOUNTER — NON-APPOINTMENT (OUTPATIENT)
Age: 57
End: 2022-12-23

## 2022-12-23 ENCOUNTER — APPOINTMENT (OUTPATIENT)
Dept: CARDIOLOGY | Facility: CLINIC | Age: 57
End: 2022-12-23

## 2022-12-23 VITALS
WEIGHT: 115 LBS | DIASTOLIC BLOOD PRESSURE: 70 MMHG | SYSTOLIC BLOOD PRESSURE: 112 MMHG | HEIGHT: 64 IN | HEART RATE: 81 BPM | OXYGEN SATURATION: 96 % | BODY MASS INDEX: 19.63 KG/M2

## 2022-12-23 DIAGNOSIS — I42.9 CARDIOMYOPATHY, UNSPECIFIED: ICD-10-CM

## 2022-12-23 PROCEDURE — 93000 ELECTROCARDIOGRAM COMPLETE: CPT

## 2022-12-23 PROCEDURE — 99214 OFFICE O/P EST MOD 30 MIN: CPT

## 2022-12-23 NOTE — HISTORY OF PRESENT ILLNESS
[FreeTextEntry1] : Larissa Stokes presented to the office today for a follow-up evaluation.  She was last seen by Dr. Reyna in 8/22.\par \par She has now 57 years old, with a history of a Takotsubo cardiomyopathy in 2005, with normal coronary arteries then.  She has had low normal LV systolic function since that time.  She also has a history of bilateral carotid dissections that completely healed, as well as a recent diagnosis of FMD of the right renal artery, and Hashimoto's thyroiditis.  She had previously been on AC with Coumadin, but is currently only on aspirin therapy.  \par \par In September 2019, she had a recurrent episode of chest pain, headache, and diaphoresis that was similar to the event in 2005. She went to the emergency room where her initial troponin was normal, although the second troponin was slightly higher.  Cardiac catheterization revealed normal coronary arteries, and a normal ejection fraction. Echocardiogram showed ejection fraction of 45-50% with hypocontractility of the anterior wall and septum.  There was suspicion at that time that perhaps she had a small MI on the basis of coronary spasm or thrombosis.  Her medications were not changed. In the hospital cholesterol was 178, triglycerides 34, HDL 88, and LDL 83 . She was placed on Plavix along with low-dose aspirin and losartan. Repeat echocardiogram performed 1/20 demonstrated a normal ejection fraction of 62% without wall motion abnormalities, and with mild MR.\par \par MRA scan of the neck 7/17 was normal. She had a CT angiogram of the heart 3/16 showed a calcium score 0 without any coronary disease.She had a recent carotid Doppler that according to the patient demonstrates mild plaque. Repeat MRA of the neck performed 7/20 was unchanged. There was distal narrowing of the internal carotid arteries consistent with history of prior dissection.\par \par She underwent a Holter monitor December, 2021 to evaluate symptoms of palpitations.  This revealed heart rates from the 50s up to the 130s.  There was a single 9 beat episode of SVT at a heart rate of 144 bpm.  On several occasions since then, she has had episodes of palpitations, lasting 15 or 20 minutes.  She was signed up for a Zio patch, but only wore it for a few hours.\par \par When last seen, she was reporting palpitations after a URI, and while on steroids. \par Fortunately, she is doing quite well today.  She has been active, and denies chest pain or difficulty breathing.  Her palpitations have been quite rare.\par \par

## 2022-12-23 NOTE — DISCUSSION/SUMMARY
[FreeTextEntry1] : Larissa is doing quite well today.  She has no worrisome symptoms at this time, and her palpitations have improved.  She did demonstrate short episodes of PAT in the past, and has since been evaluated by electrophysiology.  She has normal left ventricular systolic function, based on an echocardiogram in 2022.\par \par Her blood pressure is at goal and physical exam is unremarkable.  She will remain on verapamil, and losartan.  She will continue to stay active, and will eat right.  I am sending a full set of blood work early next year.  If no issues, I will see her again in 6 months. [EKG obtained to assist in diagnosis and management of assessed problem(s)] : EKG obtained to assist in diagnosis and management of assessed problem(s)

## 2022-12-26 ENCOUNTER — OUTPATIENT (OUTPATIENT)
Dept: OUTPATIENT SERVICES | Facility: HOSPITAL | Age: 57
LOS: 1 days | End: 2022-12-26
Payer: COMMERCIAL

## 2022-12-26 ENCOUNTER — APPOINTMENT (OUTPATIENT)
Dept: MRI IMAGING | Facility: CLINIC | Age: 57
End: 2022-12-26

## 2022-12-26 DIAGNOSIS — Z00.8 ENCOUNTER FOR OTHER GENERAL EXAMINATION: ICD-10-CM

## 2022-12-26 DIAGNOSIS — Z98.890 OTHER SPECIFIED POSTPROCEDURAL STATES: Chronic | ICD-10-CM

## 2022-12-26 DIAGNOSIS — Z98.82 BREAST IMPLANT STATUS: Chronic | ICD-10-CM

## 2022-12-26 DIAGNOSIS — Q67.6 PECTUS EXCAVATUM: Chronic | ICD-10-CM

## 2022-12-26 DIAGNOSIS — Z98.82 BREAST IMPLANT STATUS: ICD-10-CM

## 2022-12-26 DIAGNOSIS — Z90.49 ACQUIRED ABSENCE OF OTHER SPECIFIED PARTS OF DIGESTIVE TRACT: Chronic | ICD-10-CM

## 2022-12-26 PROCEDURE — C8908: CPT

## 2022-12-26 PROCEDURE — A9585: CPT

## 2022-12-26 PROCEDURE — C8937: CPT

## 2022-12-26 PROCEDURE — 77049 MRI BREAST C-+ W/CAD BI: CPT | Mod: 26

## 2022-12-29 ENCOUNTER — APPOINTMENT (OUTPATIENT)
Dept: NEUROLOGY | Facility: CLINIC | Age: 57
End: 2022-12-29

## 2023-01-01 NOTE — DISCHARGE NOTE NURSING/CASE MANAGEMENT/SOCIAL WORK - NSDCPEFALRISK_GEN_ALL_CORE
Goal Outcome Evaluation:    Infant Patient information on fall and injury prevention

## 2023-01-05 ENCOUNTER — APPOINTMENT (OUTPATIENT)
Dept: INTERNAL MEDICINE | Facility: CLINIC | Age: 58
End: 2023-01-05
Payer: COMMERCIAL

## 2023-01-05 VITALS
HEART RATE: 75 BPM | TEMPERATURE: 97.7 F | DIASTOLIC BLOOD PRESSURE: 70 MMHG | WEIGHT: 115 LBS | OXYGEN SATURATION: 99 % | BODY MASS INDEX: 19.63 KG/M2 | SYSTOLIC BLOOD PRESSURE: 116 MMHG | RESPIRATION RATE: 14 BRPM | HEIGHT: 64 IN

## 2023-01-05 DIAGNOSIS — J06.9 ACUTE UPPER RESPIRATORY INFECTION, UNSPECIFIED: ICD-10-CM

## 2023-01-05 PROCEDURE — 99214 OFFICE O/P EST MOD 30 MIN: CPT

## 2023-01-13 ENCOUNTER — APPOINTMENT (OUTPATIENT)
Dept: INTERNAL MEDICINE | Facility: CLINIC | Age: 58
End: 2023-01-13
Payer: COMMERCIAL

## 2023-01-13 VITALS
BODY MASS INDEX: 19.63 KG/M2 | HEART RATE: 67 BPM | RESPIRATION RATE: 16 BRPM | SYSTOLIC BLOOD PRESSURE: 98 MMHG | HEIGHT: 64 IN | DIASTOLIC BLOOD PRESSURE: 60 MMHG | OXYGEN SATURATION: 97 % | WEIGHT: 115 LBS | TEMPERATURE: 97.7 F

## 2023-01-13 DIAGNOSIS — R05.3 CHRONIC COUGH: ICD-10-CM

## 2023-01-13 PROCEDURE — 99213 OFFICE O/P EST LOW 20 MIN: CPT

## 2023-01-14 NOTE — PHYSICAL EXAM
[No Acute Distress] : no acute distress [Well Nourished] : well nourished [Well Developed] : well developed [Well-Appearing] : well-appearing [Normal Sclera/Conjunctiva] : normal sclera/conjunctiva [PERRL] : pupils equal round and reactive to light [EOMI] : extraocular movements intact [Normal Outer Ear/Nose] : the outer ears and nose were normal in appearance [No JVD] : no jugular venous distention [Normal Oropharynx] : the oropharynx was normal [No Lymphadenopathy] : no lymphadenopathy [Supple] : supple [Thyroid Normal, No Nodules] : the thyroid was normal and there were no nodules present [No Respiratory Distress] : no respiratory distress  [No Accessory Muscle Use] : no accessory muscle use [Clear to Auscultation] : lungs were clear to auscultation bilaterally [Normal Rate] : normal rate  [Regular Rhythm] : with a regular rhythm [Normal S1, S2] : normal S1 and S2 [No Murmur] : no murmur heard [No Carotid Bruits] : no carotid bruits [No Abdominal Bruit] : a ~M bruit was not heard ~T in the abdomen [No Varicosities] : no varicosities [Pedal Pulses Present] : the pedal pulses are present [No Edema] : there was no peripheral edema [No Palpable Aorta] : no palpable aorta [No Extremity Clubbing/Cyanosis] : no extremity clubbing/cyanosis [Soft] : abdomen soft [Non Tender] : non-tender [Non-distended] : non-distended [No Masses] : no abdominal mass palpated [No HSM] : no HSM [Normal Bowel Sounds] : normal bowel sounds [Normal Posterior Cervical Nodes] : no posterior cervical lymphadenopathy [Normal Anterior Cervical Nodes] : no anterior cervical lymphadenopathy [No CVA Tenderness] : no CVA  tenderness [No Joint Swelling] : no joint swelling [No Spinal Tenderness] : no spinal tenderness [Grossly Normal Strength/Tone] : grossly normal strength/tone [No Rash] : no rash [Coordination Grossly Intact] : coordination grossly intact [No Focal Deficits] : no focal deficits [Normal Gait] : normal gait [Deep Tendon Reflexes (DTR)] : deep tendon reflexes were 2+ and symmetric [Normal Affect] : the affect was normal [Normal Insight/Judgement] : insight and judgment were intact

## 2023-01-26 ENCOUNTER — NON-APPOINTMENT (OUTPATIENT)
Age: 58
End: 2023-01-26

## 2023-01-27 ENCOUNTER — APPOINTMENT (OUTPATIENT)
Dept: PLASTIC SURGERY | Facility: CLINIC | Age: 58
End: 2023-01-27
Payer: COMMERCIAL

## 2023-01-27 VITALS
RESPIRATION RATE: 16 BRPM | TEMPERATURE: 97.4 F | BODY MASS INDEX: 19.63 KG/M2 | SYSTOLIC BLOOD PRESSURE: 104 MMHG | DIASTOLIC BLOOD PRESSURE: 72 MMHG | WEIGHT: 115 LBS | HEART RATE: 72 BPM | OXYGEN SATURATION: 98 % | HEIGHT: 64 IN

## 2023-01-27 DIAGNOSIS — Z98.890 OTHER SPECIFIED POSTPROCEDURAL STATES: ICD-10-CM

## 2023-01-27 PROCEDURE — 99205 OFFICE O/P NEW HI 60 MIN: CPT

## 2023-01-27 RX ORDER — ALBUTEROL SULFATE 90 UG/1
108 (90 BASE) INHALANT RESPIRATORY (INHALATION)
Qty: 1 | Refills: 1 | Status: DISCONTINUED | COMMUNITY
Start: 2022-08-04 | End: 2023-01-27

## 2023-01-27 RX ORDER — AZITHROMYCIN 250 MG/1
250 TABLET, FILM COATED ORAL
Qty: 1 | Refills: 0 | Status: DISCONTINUED | COMMUNITY
Start: 2023-01-05 | End: 2023-01-27

## 2023-02-02 NOTE — HISTORY OF PRESENT ILLNESS
[FreeTextEntry1] : Larissa is a 57 year old female who presents for an initial evaluation. HIstory of pectus excavatum.  Patient with history of subglandular breast augmentation with silicone implants and pectus excavatum solid silicome implant placement in 1986. She had a bilateral capsular contracture and about a year out and eventuallly had bilateral submuscular implant placement in the late 1980's. She then had recurrent capsular contracture and then was dx with ruptured right breast implant in 1999 with some silicone seen in her right axillary lymph nodes. She then had textured saline implants (McGhan 468  - ?325cc volume) placed  in 1999 by Dr Jak Johnson at [facility] to reduce risk of capsular contracture.  SHe presents now with bilateral capsular contracture. feels the right side is smaller than the left and reports right capsular contracture.  She is also concerned with ALCL associated with the textured implants. In 2003-5 patient describes shooting pains in her breast that she would scratch and develop open sores requiring silvadene. Saw Aria Ortiz but couldn't diagnose the issue.  Patient currently has some scratches on her breast from itching. Patient saw Dr. Bagley 5 years ago who suggested replacement and wrapping with Alloderm. \par \par Patient on PLAVIX - Also history of Antiphospholipid Antibody Syndrome\par \par Family history of breast cancer- paternal aunt \par MHx: MI x 2, + cardiolipids,\par SHx: appendectomy, breast reconstruction, sternal reconstruction, septoplasty

## 2023-02-02 NOTE — PHYSICAL EXAM
[de-identified] : patient with bilateral submuscular saline implants with Baker 3 capsules and also sternal implant. IMF scars.

## 2023-02-14 ENCOUNTER — RX RENEWAL (OUTPATIENT)
Age: 58
End: 2023-02-14

## 2023-02-21 ENCOUNTER — OUTPATIENT (OUTPATIENT)
Dept: OUTPATIENT SERVICES | Facility: HOSPITAL | Age: 58
LOS: 1 days | Discharge: HOME | End: 2023-02-21

## 2023-02-21 DIAGNOSIS — Z98.890 OTHER SPECIFIED POSTPROCEDURAL STATES: Chronic | ICD-10-CM

## 2023-02-21 DIAGNOSIS — Z98.82 BREAST IMPLANT STATUS: Chronic | ICD-10-CM

## 2023-02-21 DIAGNOSIS — Q67.6 PECTUS EXCAVATUM: Chronic | ICD-10-CM

## 2023-02-21 DIAGNOSIS — Z90.49 ACQUIRED ABSENCE OF OTHER SPECIFIED PARTS OF DIGESTIVE TRACT: Chronic | ICD-10-CM

## 2023-03-02 ENCOUNTER — APPOINTMENT (OUTPATIENT)
Dept: INTERNAL MEDICINE | Facility: CLINIC | Age: 58
End: 2023-03-02

## 2023-03-08 DIAGNOSIS — F10.20 ALCOHOL DEPENDENCE, UNCOMPLICATED: ICD-10-CM

## 2023-03-15 DIAGNOSIS — F41.9 ANXIETY DISORDER, UNSPECIFIED: ICD-10-CM

## 2023-03-15 DIAGNOSIS — F33.9 MAJOR DEPRESSIVE DISORDER, RECURRENT, UNSPECIFIED: ICD-10-CM

## 2023-03-20 NOTE — PROGRESS NOTE ADULT - SUBJECTIVE AND OBJECTIVE BOX
Freddy Al M.D.  Beeper: [phone] ([facility])/ [id number] ([facility])     CCU PROGRESS NOTE    Patient is a 54y old  Female who presents with a chief complaint of NSTEMI (30 Sep 2019 06:40)    Overnight events: none  Subjective:    Medications:  MEDICATIONS  (STANDING):  ALPRAZolam 0.5 milliGRAM(s) Oral daily  aspirin 325 milliGRAM(s) Oral daily  chlorhexidine 4% Liquid 1 Application(s) Topical <User Schedule>  clonazePAM  Tablet 0.5 milliGRAM(s) Oral at bedtime  DULoxetine 30 milliGRAM(s) Oral daily  enoxaparin Injectable 40 milliGRAM(s) SubCutaneous daily  eszopiclone 3 milliGRAM(s) Oral at bedtime  levothyroxine 88 MICROGram(s) Oral daily  multivitamin 1 Tablet(s) Oral daily  sertraline 50 milliGRAM(s) Oral daily    MEDICATIONS  (PRN):  diphenhydrAMINE 25 milliGRAM(s) Oral every 6 hours PRN Rash and/or Itching    Objective:    Vitals: Vital Signs Last 24 Hrs  T(C): 36.5 (09-30-19 @ 03:30), Max: 36.9 (09-29-19 @ 09:30)  T(F): 97.7 (09-30-19 @ 03:30), Max: 98.4 (09-29-19 @ 09:30)  HR: 56 (09-30-19 @ 06:30) (48 - 72)  BP: 133/65 (09-30-19 @ 06:30) (106/56 - 137/69)  BP(mean): 96 (09-30-19 @ 06:30) (75 - 112)  RR: 23 (09-30-19 @ 06:30) (14 - 41)  SpO2: 97% (09-29-19 @ 22:00) (97% - 100%)                I&O's Summary    29 Sep 2019 07:01  -  30 Sep 2019 07:00  --------------------------------------------------------  IN: 720 mL / OUT: 750 mL / NET: -30 mL    PHYSICAL EXAM:  GENERAL: NAD, well-groomed, well-developed  CHEST/LUNG: Clear to auscultation bilaterally; No crackles, rhonchi, wheezing, or rubs  HEART: Regular rate and rhythm; No murmurs, rubs, or gallops  ABDOMEN: Soft, Nontender, Nondistended; Bowel sounds present  EXTREMITIES:  2+ Peripheral Pulses, No clubbing, cyanosis, or edema  SKIN: No rashes or lesions  NERVOUS SYSTEM:  Alert & Oriented, Good concentration                                            LABS:  09-30    142  |  107  |  8   ----------------------------<  94  3.7   |  24  |  0.74  09-29    139  |  102  |  13  ----------------------------<  100<H>  3.7   |  22  |  0.68  09-28    141  |  106  |  15  ----------------------------<  71  3.2<L>   |  12<L>  |  0.81    Ca    9.2      30 Sep 2019 04:34  Ca    9.9      29 Sep 2019 12:45  Ca    9.2      28 Sep 2019 23:25    TPro  6.4  /  Alb  3.9  /  TBili  0.4  /  DBili  x   /  AST  18  /  ALT  11  /  AlkPhos  65  09-30  TPro  7.1  /  Alb  4.4  /  TBili  0.3  /  DBili  x   /  AST  26  /  ALT  14  /  AlkPhos  70  09-29  TPro  6.9  /  Alb  3.8  /  TBili  0.2  /  DBili  x   /  AST  20  /  ALT  23  /  AlkPhos  70  09-28      PT/INR - ( 29 Sep 2019 12:42 )   PT: 10.9 sec;   INR: 0.96 ratio    PTT - ( 29 Sep 2019 12:42 )  PTT:106.2 sec                                 12.2   8.1   )-----------( 255      ( 30 Sep 2019 04:34 )             44.8                         13.2   8.0   )-----------( 245      ( 29 Sep 2019 12:42 )             40.6                         13.0   9.38  )-----------( 237      ( 28 Sep 2019 23:25 )             38.6     CAPILLARY BLOOD GLUCOSE    CARDIAC MARKERS ( 30 Sep 2019 04:34 )  x     / x     / 79 U/L / x     / 3.5 ng/mL  CARDIAC MARKERS ( 29 Sep 2019 21:38 )  x     / x     / 100 U/L / x     / 5.1 ng/mL  CARDIAC MARKERS ( 29 Sep 2019 12:45 )  x     / x     / 148 U/L / x     / 9.6 ng/mL  CARDIAC MARKERS ( 29 Sep 2019 06:15 )  2.390 ng/mL / x     / x     / x     / x      CARDIAC MARKERS ( 28 Sep 2019 23:25 )  <.015 ng/mL / x     / x     / x     / x        Troponin T, High Sensitivity (09.30.19 @ 04:34)    Troponin T, High Sensitivity Result: 111  Troponin T, High Sensitivity (09.29.19 @ 21:38)    Troponin T, High Sensitivity Result: 115  Troponin T, High Sensitivity (09.29.19 @ 12:45)    Troponin T, High Sensitivity Result: 188  Troponin I, Serum (09.29.19 @ 06:15)    Troponin I, Serum: 2.390:     RECENT CULTURES: none   RADIOLOGY & ADDITIONAL TESTS: none recent  Consultants involved in case: none Freddy Al M.D.  Beeper: [phone] ([facility])/ [phone] ([facility])     CCU PROGRESS NOTE    Patient is a 54y old  Female who presents with a chief complaint of NSTEMI (30 Sep 2019 06:40)    Overnight events: none  Subjective: no acute complaints    Medications:  MEDICATIONS  (STANDING):  ALPRAZolam 0.5 milliGRAM(s) Oral daily  aspirin 325 milliGRAM(s) Oral daily  chlorhexidine 4% Liquid 1 Application(s) Topical <User Schedule>  clonazePAM  Tablet 0.5 milliGRAM(s) Oral at bedtime  DULoxetine 30 milliGRAM(s) Oral daily  enoxaparin Injectable 40 milliGRAM(s) SubCutaneous daily  eszopiclone 3 milliGRAM(s) Oral at bedtime  levothyroxine 88 MICROGram(s) Oral daily  multivitamin 1 Tablet(s) Oral daily  sertraline 50 milliGRAM(s) Oral daily    MEDICATIONS  (PRN):  diphenhydrAMINE 25 milliGRAM(s) Oral every 6 hours PRN Rash and/or Itching    Objective:    Vitals: Vital Signs Last 24 Hrs  T(C): 36.5 (09-30-19 @ 03:30), Max: 36.9 (09-29-19 @ 09:30)  T(F): 97.7 (09-30-19 @ 03:30), Max: 98.4 (09-29-19 @ 09:30)  HR: 56 (09-30-19 @ 06:30) (48 - 72)  BP: 133/65 (09-30-19 @ 06:30) (106/56 - 137/69)  BP(mean): 96 (09-30-19 @ 06:30) (75 - 112)  RR: 23 (09-30-19 @ 06:30) (14 - 41)  SpO2: 97% (09-29-19 @ 22:00) (97% - 100%)                I&O's Summary    29 Sep 2019 07:01  -  30 Sep 2019 07:00  --------------------------------------------------------  IN: 720 mL / OUT: 750 mL / NET: -30 mL    PHYSICAL EXAM:  GENERAL: NAD, well-groomed, well-developed, conversant  CHEST/LUNG: Clear to auscultation bilaterally; No crackles, rhonchi, wheezing, or rubs  HEART: Regular rate and rhythm; No murmurs, rubs, or gallops  ABDOMEN: Soft, Nontender, Nondistended; Bowel sounds present  EXTREMITIES:  2+ Peripheral Pulses, No clubbing, cyanosis, or edema  NERVOUS SYSTEM:  Alert & Oriented, Good concentration                                            LABS:  09-30    142  |  107  |  8   ----------------------------<  94  3.7   |  24  |  0.74  09-29    139  |  102  |  13  ----------------------------<  100<H>  3.7   |  22  |  0.68  09-28    141  |  106  |  15  ----------------------------<  71  3.2<L>   |  12<L>  |  0.81    Ca    9.2      30 Sep 2019 04:34  Ca    9.9      29 Sep 2019 12:45  Ca    9.2      28 Sep 2019 23:25    TPro  6.4  /  Alb  3.9  /  TBili  0.4  /  DBili  x   /  AST  18  /  ALT  11  /  AlkPhos  65  09-30  TPro  7.1  /  Alb  4.4  /  TBili  0.3  /  DBili  x   /  AST  26  /  ALT  14  /  AlkPhos  70  09-29  TPro  6.9  /  Alb  3.8  /  TBili  0.2  /  DBili  x   /  AST  20  /  ALT  23  /  AlkPhos  70  09-28      PT/INR - ( 29 Sep 2019 12:42 )   PT: 10.9 sec;   INR: 0.96 ratio    PTT - ( 29 Sep 2019 12:42 )  PTT:106.2 sec                                 12.2   8.1   )-----------( 255      ( 30 Sep 2019 04:34 )             44.8                         13.2   8.0   )-----------( 245      ( 29 Sep 2019 12:42 )             40.6                         13.0   9.38  )-----------( 237      ( 28 Sep 2019 23:25 )             38.6     CAPILLARY BLOOD GLUCOSE    CARDIAC MARKERS ( 30 Sep 2019 04:34 )  x     / x     / 79 U/L / x     / 3.5 ng/mL  CARDIAC MARKERS ( 29 Sep 2019 21:38 )  x     / x     / 100 U/L / x     / 5.1 ng/mL  CARDIAC MARKERS ( 29 Sep 2019 12:45 )  x     / x     / 148 U/L / x     / 9.6 ng/mL  CARDIAC MARKERS ( 29 Sep 2019 06:15 )  2.390 ng/mL / x     / x     / x     / x      CARDIAC MARKERS ( 28 Sep 2019 23:25 )  <.015 ng/mL / x     / x     / x     / x        Troponin T, High Sensitivity (09.30.19 @ 04:34)    Troponin T, High Sensitivity Result: 111  Troponin T, High Sensitivity (09.29.19 @ 21:38)    Troponin T, High Sensitivity Result: 115  Troponin T, High Sensitivity (09.29.19 @ 12:45)    Troponin T, High Sensitivity Result: 188  Troponin I, Serum (09.29.19 @ 06:15)    Troponin I, Serum: 2.390:     RECENT CULTURES: none   RADIOLOGY & ADDITIONAL TESTS: none recent  Consultants involved in case: none Which Kenalog Vial Was Used?: Kenalog 10 mg/ml (5 ml vial) Kenalog Preparation: Kenalog How Many Mls Were Removed From The 40 Mg/Ml (10ml) Vial When Preparing The Injectable Solution?: 0 Medical Necessity Clause: This procedure was medically necessary because the lesions that were treated were: How Many Mls Were Removed From The 10 Mg/Ml (5ml) Vial When Preparing The Injectable Solution?: 0.5 Concentration Of Solution Injected (Mg/Ml): 5.0 Validate Note Data When Using Inventory: Yes Total Volume Injected (Ccs- Only Use Numbers And Decimals): 0.4 Consent: The risks of atrophy were reviewed with the patient. Detail Level: Detailed Include Z78.9 (Other Specified Conditions Influencing Health Status) As An Associated Diagnosis?: No Administered By (Optional): Dr. Murguia Size Of Lesion (Optional): 3

## 2023-03-21 NOTE — PHYSICAL EXAM
[General Appearance - In No Acute Distress] : in no acute distress [General Appearance - Alert] : alert [Auscultation Breath Sounds / Voice Sounds] : lungs were clear to auscultation bilaterally [Heart Rate And Rhythm] : heart rate was normal and rhythm regular [Heart Sounds] : normal S1 and S2 [Murmurs] : no murmurs [Heart Sounds Gallop] : no gallops [Heart Sounds Pericardial Friction Rub] : no pericardial rub [Bowel Sounds] : normal bowel sounds [Abdomen Soft] : soft [Edema] : there was no peripheral edema [Abdomen Tenderness] : non-tender [Abdomen Mass (___ Cm)] : no abdominal mass palpated [] : no hepato-splenomegaly [No Focal Deficits] : no focal deficits [Skin Color & Pigmentation] : normal skin color and pigmentation 3 = A little assistance

## 2023-04-13 NOTE — ED ADULT NURSE NOTE - NS ED NURSE DISCH DISPOSITION
Plan: Patient will repeat blood work next week, will wait for results. Detail Level: Simple Continue Regimen: methotrexate four 2.5 mg tablets once weekly. Will increase folic acid from two pills qd to four pills qd. Patient will use fluticasone as needed Discharged

## 2023-05-02 ENCOUNTER — APPOINTMENT (OUTPATIENT)
Dept: INTERNAL MEDICINE | Facility: CLINIC | Age: 58
End: 2023-05-02

## 2023-05-18 ENCOUNTER — APPOINTMENT (OUTPATIENT)
Dept: CARDIOLOGY | Facility: CLINIC | Age: 58
End: 2023-05-18
Payer: COMMERCIAL

## 2023-05-18 ENCOUNTER — NON-APPOINTMENT (OUTPATIENT)
Age: 58
End: 2023-05-18

## 2023-05-18 VITALS
DIASTOLIC BLOOD PRESSURE: 78 MMHG | BODY MASS INDEX: 19.29 KG/M2 | SYSTOLIC BLOOD PRESSURE: 110 MMHG | HEART RATE: 56 BPM | OXYGEN SATURATION: 99 % | HEIGHT: 64 IN | WEIGHT: 113 LBS

## 2023-05-18 DIAGNOSIS — D68.61 ANTIPHOSPHOLIPID SYNDROME: ICD-10-CM

## 2023-05-18 PROCEDURE — 99214 OFFICE O/P EST MOD 30 MIN: CPT

## 2023-05-18 NOTE — HISTORY OF PRESENT ILLNESS
[FreeTextEntry1] : 5/18/2023\par Followup visit \par She has a gnawing with food.  \par Every time she eats, stomach pains. \par 2 glasses wine every night\par Seen by Dr. Ziegler 1 year ago.  had carotids and renals at that time.   Was told stable.  \par She has not tried an antacid.  \par \par Meds:\par Naltrexone\par Plavix 75\par Losartan 25\par Verapamil 180mg\par Setriline 50 \par Cymbalta 30\par Synthroid 88\par Xanax 0.5\par Lunesta\par \par \par \par 12/22/2021\par Here for followup visit.\par About 2 weeks ago, she felt her heart race for 1.5 hours.  At night time.  After 1-2 glassess of wine prior.  This has happened a few times in the past, usually while at rest.  Then it went away.  She took her BP at the time and her HR was 91.  Her BP was normal 130/60.\par \par Was started on a medicine that starts with an "N" \par \par She is exercising without issues.  \par No CP or Pressure. \par No SOB. \par \par \par Medications:\par Plavix 75\par Losartan 25\par Verapamil 180mg\par Setriline 50 \par Cymbalta 30\par Synthroid 88\par Xanax 0.5\par Linzess\par Lunesta\par \par \par \par 3/10/2021\par \par Has elective surgery March 23rd (mini-lift of face) with Dr. Jose F Segura [phone]\par Denies any recent CP or SOB episodes. Denies any major abdominal changes.\par Seeing Dr. Turner her cardiologist tomorrow. \par Able to walk from [facility] hospital to my office , up a steep hill without any issues.\par She is exercising 3x per week.\par \par Abdomen feels well.  \par \par Needs to hold Plavix for 5 days prior to surgery.  \par She will take Plavix last day March 18th.  Start aspirin 81mg daily while off plavix.  \par \par \par Medications:\par Plavix 75\par Losartan 25\par Verapamil 180mg\par Cetriline 50 \par Cymbalta 30\par Synthroid 88\par Xanax 0.5\par Linzess\par Lunesta

## 2023-05-18 NOTE — ASSESSMENT
[FreeTextEntry1] : Assessment:\par 1. SMA dissection -resolved\par 2. LV dysfunction - resolved\par     a. Echo from jan 2020 improved\par 3. 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 MRI/MRA 7/2020 : chronic but unchanged dissections in distal carotids.\par 4.  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  Palpitations\par 6.  hashimotos\par \par \par Plan:\par 1. Continue current meds\par 2.  For abdominal pains with eating, will get CTA abdomen /pelvis to eval for mesenteric issues / dissection \par 3.  Trial of famotidine sent into pharmacy \par 4.  Referral to GI \par 5.  Labwork today\par 6.  Await CTA abdomen /pelvis\par \par

## 2023-05-19 LAB
ALBUMIN SERPL ELPH-MCNC: 5 G/DL
ALP BLD-CCNC: 66 U/L
ALT SERPL-CCNC: 11 U/L
AMYLASE/CREAT SERPL: 104 U/L
ANION GAP SERPL CALC-SCNC: 11 MMOL/L
AST SERPL-CCNC: 25 U/L
BILIRUB SERPL-MCNC: 0.5 MG/DL
BUN SERPL-MCNC: 10 MG/DL
CALCIUM SERPL-MCNC: 10.2 MG/DL
CHLORIDE SERPL-SCNC: 102 MMOL/L
CHOLEST SERPL-MCNC: 208 MG/DL
CO2 SERPL-SCNC: 27 MMOL/L
CREAT SERPL-MCNC: 0.73 MG/DL
EGFR: 95 ML/MIN/1.73M2
ESTIMATED AVERAGE GLUCOSE: 100 MG/DL
GLUCOSE SERPL-MCNC: 88 MG/DL
HBA1C MFR BLD HPLC: 5.1 %
HDLC SERPL-MCNC: 116 MG/DL
LDLC SERPL CALC-MCNC: 69 MG/DL
LPL SERPL-CCNC: 42 U/L
NONHDLC SERPL-MCNC: 92 MG/DL
POTASSIUM SERPL-SCNC: 4.7 MMOL/L
PROT SERPL-MCNC: 7.3 G/DL
SILICA CLOTTING TIME INTERPRETATION: NORMAL
SILICA CLOTTING TIME S/C: 0.96 RATIO
SODIUM SERPL-SCNC: 141 MMOL/L
TRIGL SERPL-MCNC: 115 MG/DL

## 2023-05-21 LAB
CARDIOLIPIN AB SER IA-ACNC: NEGATIVE
CARDIOLIPIN IGM SER-MCNC: 34.1 MPL
CARDIOLIPIN IGM SER-MCNC: <5 GPL

## 2023-05-24 LAB
B2 GLYCOPROT1 IGA SERPL IA-ACNC: <5 SAU
B2 GLYCOPROT1 IGG SER-ACNC: <5 SGU
B2 GLYCOPROT1 IGM SER-ACNC: <5 SMU

## 2023-06-07 ENCOUNTER — APPOINTMENT (OUTPATIENT)
Dept: PLASTIC SURGERY | Facility: CLINIC | Age: 58
End: 2023-06-07

## 2023-06-12 ENCOUNTER — APPOINTMENT (OUTPATIENT)
Dept: INTERNAL MEDICINE | Facility: CLINIC | Age: 58
End: 2023-06-12

## 2023-06-19 ENCOUNTER — APPOINTMENT (OUTPATIENT)
Dept: CT IMAGING | Facility: CLINIC | Age: 58
End: 2023-06-19

## 2023-06-20 ENCOUNTER — APPOINTMENT (OUTPATIENT)
Dept: INTERNAL MEDICINE | Facility: CLINIC | Age: 58
End: 2023-06-20
Payer: COMMERCIAL

## 2023-06-20 VITALS
HEIGHT: 64 IN | RESPIRATION RATE: 14 BRPM | SYSTOLIC BLOOD PRESSURE: 106 MMHG | TEMPERATURE: 98 F | OXYGEN SATURATION: 98 % | DIASTOLIC BLOOD PRESSURE: 76 MMHG | BODY MASS INDEX: 19.12 KG/M2 | WEIGHT: 112 LBS | HEART RATE: 60 BPM

## 2023-06-20 DIAGNOSIS — L08.9 LOCAL INFECTION OF THE SKIN AND SUBCUTANEOUS TISSUE, UNSPECIFIED: ICD-10-CM

## 2023-06-20 DIAGNOSIS — Z86.39 PERSONAL HISTORY OF OTHER ENDOCRINE, NUTRITIONAL AND METABOLIC DISEASE: ICD-10-CM

## 2023-06-20 PROCEDURE — 99214 OFFICE O/P EST MOD 30 MIN: CPT

## 2023-06-20 NOTE — HISTORY OF PRESENT ILLNESS
[FreeTextEntry1] : Here for follow-up \nery Wants to check TFTs [de-identified] : Has a skin infection on her right outer ear previously treated successfully with minocycline by Dermatology

## 2023-06-21 LAB
T3 SERPL-MCNC: 84 NG/DL
T3FREE SERPL-MCNC: 2.09 PG/ML
T4 FREE SERPL-MCNC: 1 NG/DL
T4 SERPL-MCNC: 4.8 UG/DL
TSH SERPL-ACNC: 2.27 UIU/ML

## 2023-07-28 ENCOUNTER — RESULT REVIEW (OUTPATIENT)
Age: 58
End: 2023-07-28

## 2023-07-28 ENCOUNTER — APPOINTMENT (OUTPATIENT)
Dept: PLASTIC SURGERY | Facility: CLINIC | Age: 58
End: 2023-07-28
Payer: COMMERCIAL

## 2023-07-28 VITALS
RESPIRATION RATE: 16 BRPM | SYSTOLIC BLOOD PRESSURE: 94 MMHG | BODY MASS INDEX: 19.63 KG/M2 | OXYGEN SATURATION: 100 % | WEIGHT: 115 LBS | HEIGHT: 64 IN | DIASTOLIC BLOOD PRESSURE: 68 MMHG | HEART RATE: 70 BPM

## 2023-07-28 PROCEDURE — 99213 OFFICE O/P EST LOW 20 MIN: CPT

## 2023-08-04 ENCOUNTER — RX RENEWAL (OUTPATIENT)
Age: 58
End: 2023-08-04

## 2023-08-21 ENCOUNTER — APPOINTMENT (OUTPATIENT)
Dept: INTERNAL MEDICINE | Facility: CLINIC | Age: 58
End: 2023-08-21

## 2023-08-22 ENCOUNTER — APPOINTMENT (OUTPATIENT)
Dept: INTERNAL MEDICINE | Facility: CLINIC | Age: 58
End: 2023-08-22
Payer: COMMERCIAL

## 2023-08-22 VITALS
BODY MASS INDEX: 18.78 KG/M2 | HEART RATE: 73 BPM | RESPIRATION RATE: 16 BRPM | DIASTOLIC BLOOD PRESSURE: 74 MMHG | HEIGHT: 64 IN | TEMPERATURE: 97.8 F | OXYGEN SATURATION: 98 % | SYSTOLIC BLOOD PRESSURE: 100 MMHG | WEIGHT: 110 LBS

## 2023-08-22 DIAGNOSIS — Z87.898 PERSONAL HISTORY OF OTHER SPECIFIED CONDITIONS: ICD-10-CM

## 2023-08-22 PROCEDURE — 99214 OFFICE O/P EST MOD 30 MIN: CPT

## 2023-08-22 NOTE — HISTORY OF PRESENT ILLNESS
[FreeTextEntry8] : Pt is c/o loose BMs for about 10 days. Pt vomited 3 days ago. Has body aches and sore throat. Also has a HA. Tested negative twice for covid.

## 2023-08-23 LAB
ALBUMIN SERPL ELPH-MCNC: 4.9 G/DL
ALP BLD-CCNC: 67 U/L
ALT SERPL-CCNC: 11 U/L
ANION GAP SERPL CALC-SCNC: 12 MMOL/L
AST SERPL-CCNC: 25 U/L
BILIRUB SERPL-MCNC: 0.3 MG/DL
BUN SERPL-MCNC: 11 MG/DL
CALCIUM SERPL-MCNC: 9.7 MG/DL
CHLORIDE SERPL-SCNC: 103 MMOL/L
CO2 SERPL-SCNC: 26 MMOL/L
CREAT SERPL-MCNC: 0.69 MG/DL
EGFR: 101 ML/MIN/1.73M2
GLUCOSE SERPL-MCNC: 79 MG/DL
POTASSIUM SERPL-SCNC: 4.3 MMOL/L
PROT SERPL-MCNC: 7.2 G/DL
SODIUM SERPL-SCNC: 141 MMOL/L

## 2023-08-28 ENCOUNTER — NON-APPOINTMENT (OUTPATIENT)
Age: 58
End: 2023-08-28

## 2023-08-28 ENCOUNTER — APPOINTMENT (OUTPATIENT)
Dept: CARDIOLOGY | Facility: CLINIC | Age: 58
End: 2023-08-28
Payer: COMMERCIAL

## 2023-08-28 ENCOUNTER — APPOINTMENT (OUTPATIENT)
Dept: CARDIOLOGY | Facility: CLINIC | Age: 58
End: 2023-08-28

## 2023-08-28 VITALS
HEIGHT: 64 IN | WEIGHT: 112 LBS | BODY MASS INDEX: 19.12 KG/M2 | OXYGEN SATURATION: 99 % | DIASTOLIC BLOOD PRESSURE: 63 MMHG | HEART RATE: 56 BPM | SYSTOLIC BLOOD PRESSURE: 108 MMHG

## 2023-08-28 DIAGNOSIS — R94.31 ABNORMAL ELECTROCARDIOGRAM [ECG] [EKG]: ICD-10-CM

## 2023-08-28 PROCEDURE — 93000 ELECTROCARDIOGRAM COMPLETE: CPT

## 2023-08-28 PROCEDURE — 99214 OFFICE O/P EST MOD 30 MIN: CPT

## 2023-08-28 NOTE — HISTORY OF PRESENT ILLNESS
[FreeTextEntry1] : Larissa Stokes presented to the office today for a follow-up evaluation.  I last saw her in 12/22.  She has now 57 years old, with a history of a Takotsubo cardiomyopathy in 2005, with normal coronary arteries then.  She has had low normal LV systolic function since that time.  She also has a history of bilateral carotid dissections that completely healed, as well as a recent diagnosis of FMD of the right renal artery, and Hashimoto's thyroiditis.  She had previously been on AC with Coumadin, but is currently only on aspirin therapy.    In September 2019, she had a recurrent episode of chest pain, headache, and diaphoresis that was similar to the event in 2005. She went to the emergency room where her initial troponin was normal, although the second troponin was slightly higher.  Cardiac catheterization revealed normal coronary arteries, and a normal ejection fraction. Echocardiogram showed ejection fraction of 45-50% with hypocontractility of the anterior wall and septum.  There was suspicion at that time that perhaps she had a small MI on the basis of coronary spasm or thrombosis.  Her medications were not changed. In the hospital cholesterol was 178, triglycerides 34, HDL 88, and LDL 83 . She was placed on Plavix along with low-dose aspirin and losartan. Repeat echocardiogram performed 1/20 demonstrated a normal ejection fraction of 62% without wall motion abnormalities, and with mild MR.  MRA scan of the neck 7/17 was normal. She had a CT angiogram of the heart 3/16 showed a calcium score 0 without any coronary disease. She had a recent carotid Doppler that according to the patient demonstrates mild plaque. Repeat MRA of the neck performed 7/20 was unchanged. There was distal narrowing of the internal carotid arteries consistent with history of prior dissection.  She underwent a Holter monitor December, 2021 to evaluate symptoms of palpitations.  This revealed heart rates from the 50s up to the 130s.  There was a single 9 beat episode of SVT at a heart rate of 144 bpm.  On several occasions since then, she has had episodes of palpitations, lasting 15 or 20 minutes.  She was signed up for a Zio patch, but only wore it for a few hours.  Overall, she is doing ok. She has been active, and denies chest pain or difficulty breathing.  Her palpitations have been rare. She is exercising 3x/week, without issue. She has been under stress and drinking. She is going to check herself into an inpatient alcohol rehab today. She is now on Buspar.

## 2023-08-28 NOTE — DISCUSSION/SUMMARY
[FreeTextEntry1] : Larissa is doing okay today.  She has no worrisome exertional symptoms at this time, and her palpitations have improved.  She did demonstrate short episodes of PAT in the past, and has since been evaluated by electrophysiology.  She has normal left ventricular systolic function, based on an echocardiogram in 2022. Her EKG continues to demonstrate a non specific ST abnormality, generally unchanged from an EKG in 2021.  Her blood pressure is at goal and physical exam is unremarkable.  She will remain on verapamil, and losartan.  She is going to be voluntarily admitted to inpatient alcohol rehab for detox, today, and will be there for the next few days.   She will let me know how things go.  If no issues, I will see her again in 6 months. [EKG obtained to assist in diagnosis and management of assessed problem(s)] : EKG obtained to assist in diagnosis and management of assessed problem(s)

## 2023-08-29 NOTE — PHYSICAL EXAM
[NI] : Normal [de-identified] : NAD, AxOx3 [de-identified] : nonlabored breathing  [de-identified] : normal HR [de-identified] : Bilateral breasts: no masses, no nipple discharge nor retraction.  Right breast sits slightly higher then the left. There is animation deformity. Breast asymmetry is present. There is grade 2 capsular contracture on the right.  IMF scars well healed. Palpable bilateral axillary lymph nodes RIGHT: SN-N: 20, N-IMF: 6.5, BW: 10.5 LEFT: SN-N: 22, N-IMF: 7, BW: 10.5  [de-identified] : no edema [de-identified] : grossly intact  [de-identified] : normal affect

## 2023-08-29 NOTE — HISTORY OF PRESENT ILLNESS
[FreeTextEntry1] : BENJA OWENS is a 57 year female presenting to the office today with history of pectus excavatum for which she had a sternal implant and silicone breast implants placed in 1986. She had subsequent revisions thereafter, including due to implant rupture.  She as well has silicone in her axillary lymph nodes. Patient has since had her breast implants exchanged to saline. Patient presents today c/o breast asymmetry and pain to the right breast. Patient not sure of fill quantity but does know that they are 468cc textured saline implants and have been recalled. She as well feels that her breast implants have become smaller then they originally were.  Patient does c/o experiencing night sweats and skin rashes.  Patient can not recall last mammogram. Patient did have a breast MRI in 8/2022 which showed both implants intact.  PMHx- cardiolipin antibody, h/o MIx2 (on Plavix) TIA, fibromuscular dysplasia, Hashimotos thyroiditis  PSHx- appendectomy, nose surgery, facelift Allergies- epinephrine sensitivity Denies tobacco use Family history significant for paternal aunt with breast CA, Brother: DM, Mother: HTN Patient works as a

## 2023-08-30 ENCOUNTER — NON-APPOINTMENT (OUTPATIENT)
Age: 58
End: 2023-08-30

## 2023-09-03 ENCOUNTER — EMERGENCY (EMERGENCY)
Facility: HOSPITAL | Age: 58
LOS: 1 days | Discharge: ANOTHER HOSPITAL | End: 2023-09-03
Attending: STUDENT IN AN ORGANIZED HEALTH CARE EDUCATION/TRAINING PROGRAM | Admitting: STUDENT IN AN ORGANIZED HEALTH CARE EDUCATION/TRAINING PROGRAM
Payer: COMMERCIAL

## 2023-09-03 ENCOUNTER — EMERGENCY (EMERGENCY)
Facility: HOSPITAL | Age: 58
LOS: 1 days | Discharge: HOME | End: 2023-09-03
Attending: STUDENT IN AN ORGANIZED HEALTH CARE EDUCATION/TRAINING PROGRAM | Admitting: STUDENT IN AN ORGANIZED HEALTH CARE EDUCATION/TRAINING PROGRAM
Payer: COMMERCIAL

## 2023-09-03 VITALS
RESPIRATION RATE: 18 BRPM | OXYGEN SATURATION: 99 % | TEMPERATURE: 98 F | HEART RATE: 62 BPM | SYSTOLIC BLOOD PRESSURE: 122 MMHG | DIASTOLIC BLOOD PRESSURE: 88 MMHG

## 2023-09-03 VITALS
WEIGHT: 119.93 LBS | HEIGHT: 64 IN | DIASTOLIC BLOOD PRESSURE: 58 MMHG | TEMPERATURE: 98 F | HEART RATE: 66 BPM | RESPIRATION RATE: 18 BRPM | SYSTOLIC BLOOD PRESSURE: 117 MMHG | OXYGEN SATURATION: 100 %

## 2023-09-03 VITALS
HEART RATE: 63 BPM | DIASTOLIC BLOOD PRESSURE: 71 MMHG | OXYGEN SATURATION: 99 % | TEMPERATURE: 98 F | RESPIRATION RATE: 18 BRPM | SYSTOLIC BLOOD PRESSURE: 131 MMHG

## 2023-09-03 VITALS
HEIGHT: 64 IN | HEART RATE: 66 BPM | TEMPERATURE: 98 F | SYSTOLIC BLOOD PRESSURE: 127 MMHG | DIASTOLIC BLOOD PRESSURE: 71 MMHG | OXYGEN SATURATION: 100 % | RESPIRATION RATE: 18 BRPM

## 2023-09-03 DIAGNOSIS — Q67.6 PECTUS EXCAVATUM: Chronic | ICD-10-CM

## 2023-09-03 DIAGNOSIS — Z90.49 ACQUIRED ABSENCE OF OTHER SPECIFIED PARTS OF DIGESTIVE TRACT: Chronic | ICD-10-CM

## 2023-09-03 DIAGNOSIS — Z98.890 OTHER SPECIFIED POSTPROCEDURAL STATES: Chronic | ICD-10-CM

## 2023-09-03 DIAGNOSIS — Z98.82 BREAST IMPLANT STATUS: Chronic | ICD-10-CM

## 2023-09-03 LAB
ALBUMIN SERPL ELPH-MCNC: 3.7 G/DL — SIGNIFICANT CHANGE UP (ref 3.3–5)
ALP SERPL-CCNC: 64 U/L — SIGNIFICANT CHANGE UP (ref 40–120)
ALT FLD-CCNC: 18 U/L — SIGNIFICANT CHANGE UP (ref 12–78)
ANION GAP SERPL CALC-SCNC: 6 MMOL/L — SIGNIFICANT CHANGE UP (ref 5–17)
APTT BLD: 32.5 SEC — SIGNIFICANT CHANGE UP (ref 24.5–35.6)
AST SERPL-CCNC: 18 U/L — SIGNIFICANT CHANGE UP (ref 15–37)
BASOPHILS # BLD AUTO: 0.06 K/UL — SIGNIFICANT CHANGE UP (ref 0–0.2)
BASOPHILS NFR BLD AUTO: 0.6 % — SIGNIFICANT CHANGE UP (ref 0–2)
BILIRUB SERPL-MCNC: 0.3 MG/DL — SIGNIFICANT CHANGE UP (ref 0.2–1.2)
BUN SERPL-MCNC: 16 MG/DL — SIGNIFICANT CHANGE UP (ref 7–23)
CALCIUM SERPL-MCNC: 9.2 MG/DL — SIGNIFICANT CHANGE UP (ref 8.5–10.1)
CHLORIDE SERPL-SCNC: 108 MMOL/L — SIGNIFICANT CHANGE UP (ref 96–108)
CO2 SERPL-SCNC: 27 MMOL/L — SIGNIFICANT CHANGE UP (ref 22–31)
CREAT SERPL-MCNC: 0.84 MG/DL — SIGNIFICANT CHANGE UP (ref 0.5–1.3)
CRP SERPL-MCNC: <3 MG/L — SIGNIFICANT CHANGE UP
EGFR: 80 ML/MIN/1.73M2 — SIGNIFICANT CHANGE UP
EOSINOPHIL # BLD AUTO: 0.23 K/UL — SIGNIFICANT CHANGE UP (ref 0–0.5)
EOSINOPHIL NFR BLD AUTO: 2.5 % — SIGNIFICANT CHANGE UP (ref 0–6)
ERYTHROCYTE [SEDIMENTATION RATE] IN BLOOD: 2 MM/HR — SIGNIFICANT CHANGE UP (ref 0–20)
GLUCOSE SERPL-MCNC: 109 MG/DL — HIGH (ref 70–99)
HCG SERPL-ACNC: 2 MIU/ML — SIGNIFICANT CHANGE UP
HCT VFR BLD CALC: 38.2 % — SIGNIFICANT CHANGE UP (ref 34.5–45)
HGB BLD-MCNC: 12.8 G/DL — SIGNIFICANT CHANGE UP (ref 11.5–15.5)
IMM GRANULOCYTES NFR BLD AUTO: 0.4 % — SIGNIFICANT CHANGE UP (ref 0–0.9)
INR BLD: 0.89 RATIO — SIGNIFICANT CHANGE UP (ref 0.85–1.18)
LYMPHOCYTES # BLD AUTO: 1.79 K/UL — SIGNIFICANT CHANGE UP (ref 1–3.3)
LYMPHOCYTES # BLD AUTO: 19.3 % — SIGNIFICANT CHANGE UP (ref 13–44)
MCHC RBC-ENTMCNC: 31.8 PG — SIGNIFICANT CHANGE UP (ref 27–34)
MCHC RBC-ENTMCNC: 33.5 GM/DL — SIGNIFICANT CHANGE UP (ref 32–36)
MCV RBC AUTO: 94.8 FL — SIGNIFICANT CHANGE UP (ref 80–100)
MONOCYTES # BLD AUTO: 0.85 K/UL — SIGNIFICANT CHANGE UP (ref 0–0.9)
MONOCYTES NFR BLD AUTO: 9.2 % — SIGNIFICANT CHANGE UP (ref 2–14)
NEUTROPHILS # BLD AUTO: 6.31 K/UL — SIGNIFICANT CHANGE UP (ref 1.8–7.4)
NEUTROPHILS NFR BLD AUTO: 68 % — SIGNIFICANT CHANGE UP (ref 43–77)
NRBC # BLD: 0 /100 WBCS — SIGNIFICANT CHANGE UP (ref 0–0)
PLATELET # BLD AUTO: 214 K/UL — SIGNIFICANT CHANGE UP (ref 150–400)
POTASSIUM SERPL-MCNC: 3.7 MMOL/L — SIGNIFICANT CHANGE UP (ref 3.5–5.3)
POTASSIUM SERPL-SCNC: 3.7 MMOL/L — SIGNIFICANT CHANGE UP (ref 3.5–5.3)
PROT SERPL-MCNC: 6.7 G/DL — SIGNIFICANT CHANGE UP (ref 6–8.3)
PROTHROM AB SERPL-ACNC: 10.5 SEC — SIGNIFICANT CHANGE UP (ref 9.5–13)
RBC # BLD: 4.03 M/UL — SIGNIFICANT CHANGE UP (ref 3.8–5.2)
RBC # FLD: 11.9 % — SIGNIFICANT CHANGE UP (ref 10.3–14.5)
SODIUM SERPL-SCNC: 141 MMOL/L — SIGNIFICANT CHANGE UP (ref 135–145)
WBC # BLD: 9.28 K/UL — SIGNIFICANT CHANGE UP (ref 3.8–10.5)
WBC # FLD AUTO: 9.28 K/UL — SIGNIFICANT CHANGE UP (ref 3.8–10.5)

## 2023-09-03 PROCEDURE — 71046 X-RAY EXAM CHEST 2 VIEWS: CPT | Mod: 26

## 2023-09-03 PROCEDURE — 36415 COLL VENOUS BLD VENIPUNCTURE: CPT

## 2023-09-03 PROCEDURE — 99285 EMERGENCY DEPT VISIT HI MDM: CPT

## 2023-09-03 PROCEDURE — 99285 EMERGENCY DEPT VISIT HI MDM: CPT | Mod: 25

## 2023-09-03 PROCEDURE — 82962 GLUCOSE BLOOD TEST: CPT

## 2023-09-03 PROCEDURE — 70498 CT ANGIOGRAPHY NECK: CPT | Mod: 26,MA

## 2023-09-03 PROCEDURE — 70450 CT HEAD/BRAIN W/O DYE: CPT | Mod: MA

## 2023-09-03 PROCEDURE — 96375 TX/PRO/DX INJ NEW DRUG ADDON: CPT | Mod: XU

## 2023-09-03 PROCEDURE — 70496 CT ANGIOGRAPHY HEAD: CPT | Mod: MA

## 2023-09-03 PROCEDURE — 70498 CT ANGIOGRAPHY NECK: CPT | Mod: MA

## 2023-09-03 PROCEDURE — 84702 CHORIONIC GONADOTROPIN TEST: CPT

## 2023-09-03 PROCEDURE — 96374 THER/PROPH/DIAG INJ IV PUSH: CPT | Mod: XU

## 2023-09-03 PROCEDURE — 85730 THROMBOPLASTIN TIME PARTIAL: CPT

## 2023-09-03 PROCEDURE — 85025 COMPLETE CBC W/AUTO DIFF WBC: CPT

## 2023-09-03 PROCEDURE — 85652 RBC SED RATE AUTOMATED: CPT

## 2023-09-03 PROCEDURE — 85610 PROTHROMBIN TIME: CPT

## 2023-09-03 PROCEDURE — 70450 CT HEAD/BRAIN W/O DYE: CPT | Mod: 26,59,MA

## 2023-09-03 PROCEDURE — 93010 ELECTROCARDIOGRAM REPORT: CPT

## 2023-09-03 PROCEDURE — 93005 ELECTROCARDIOGRAM TRACING: CPT

## 2023-09-03 PROCEDURE — 80053 COMPREHEN METABOLIC PANEL: CPT

## 2023-09-03 PROCEDURE — 70496 CT ANGIOGRAPHY HEAD: CPT | Mod: 26,MA

## 2023-09-03 RX ORDER — OFLOXACIN 0.3 %
1 DROPS OPHTHALMIC (EYE)
Refills: 0 | Status: DISCONTINUED | OUTPATIENT
Start: 2023-09-03 | End: 2023-09-07

## 2023-09-03 RX ORDER — ERYTHROMYCIN BASE 5 MG/GRAM
1 OINTMENT (GRAM) OPHTHALMIC (EYE) AT BEDTIME
Refills: 0 | Status: DISCONTINUED | OUTPATIENT
Start: 2023-09-03 | End: 2023-09-07

## 2023-09-03 RX ORDER — CYCLOPENTOLATE HYDROCHLORIDE 10 MG/ML
1 SOLUTION/ DROPS OPHTHALMIC THREE TIMES A DAY
Refills: 0 | Status: DISCONTINUED | OUTPATIENT
Start: 2023-09-03 | End: 2023-09-07

## 2023-09-03 RX ORDER — ACETAMINOPHEN 500 MG
1000 TABLET ORAL ONCE
Refills: 0 | Status: COMPLETED | OUTPATIENT
Start: 2023-09-03 | End: 2023-09-03

## 2023-09-03 RX ORDER — DIPHENHYDRAMINE HCL 50 MG
50 CAPSULE ORAL ONCE
Refills: 0 | Status: COMPLETED | OUTPATIENT
Start: 2023-09-03 | End: 2023-09-03

## 2023-09-03 RX ORDER — PREDNISOLONE SODIUM PHOSPHATE 1 %
1 DROPS OPHTHALMIC (EYE)
Refills: 0 | Status: DISCONTINUED | OUTPATIENT
Start: 2023-09-03 | End: 2023-09-07

## 2023-09-03 RX ADMIN — Medication 50 MILLIGRAM(S): at 10:29

## 2023-09-03 RX ADMIN — Medication 1 DROP(S): at 10:00

## 2023-09-03 RX ADMIN — Medication 1 APPLICATION(S): at 22:04

## 2023-09-03 RX ADMIN — Medication 1 DROP(S): at 22:04

## 2023-09-03 RX ADMIN — Medication 1000 MILLIGRAM(S): at 14:00

## 2023-09-03 RX ADMIN — CYCLOPENTOLATE HYDROCHLORIDE 1 DROP(S): 10 SOLUTION/ DROPS OPHTHALMIC at 22:04

## 2023-09-03 RX ADMIN — Medication 400 MILLIGRAM(S): at 13:06

## 2023-09-03 NOTE — ED PROVIDER NOTE - CLINICAL SUMMARY MEDICAL DECISION MAKING FREE TEXT BOX
Patient is a 58 year-old-female with history of CAD, HTN, fibromuscular dysplasia and previous carotid artery dissection presents as a transfer from [place] for ophthalmology consult to r/o uveitis. Patient is afebrile, hemodynamically stable and saturating well on room air. No other complaints other than L eye pain/redness. Exam remarkable for consensual photophobia in the L eye. Ophthalmology consulted.

## 2023-09-03 NOTE — ED PROVIDER NOTE - CLINICAL SUMMARY MEDICAL DECISION MAKING FREE TEXT BOX
Left eye redness and pain that was worsened when watching a movie last night, concern for glaucoma.  However eye pressure in right eye is 17 and left eye is 14.  With temporal artery pain as well, would consider temporal arteritis.  Plan to check ESR.  With ptosis of the right eye a few days prior, will check for recurrent dissection or vascular pathology.  At this time no focal neurodeficits to suggest stroke.  Plan on lab work, imaging, reassess.  States had rash 9 days after receiving contrast in past. Will premedicate with benadryl prior to CT scan. Left eye redness and pain that was worsened when watching a movie last night, concern for glaucoma.  However eye pressure in right eye is 17 and left eye is 14. History and exam is not consistent with corneal abrasion.  Possible iritis with consensual photophobia. With temporal artery pain as well, would consider temporal arteritis.  Plan to check ESR.  With ptosis of the right eye a few days prior, will check for recurrent dissection or vascular pathology.  At this time no focal neurodeficits to suggest stroke.  Plan on lab work, imaging, reassess.  States had rash 9 days after receiving contrast in past. Will premedicate with benadryl prior to CT scan.

## 2023-09-03 NOTE — ED PROVIDER NOTE - NSFOLLOWUPCLINICS_GEN_ALL_ED_FT
[facility]  Ophthalmology  [address] [place] [address], [facility] [address]  [place] [address]  Phone: [phone]  Fax:

## 2023-09-03 NOTE — ED PROVIDER NOTE - ATTENDING CONTRIBUTION TO CARE
Dr. Bear, Attending Physician-  I performed a face to face bedside interview with patient regarding history of present illness, review of symptoms and past medical history. I completed an independent physical exam.  I have discussed patient's plan of care with the resident.    58F, CAD, fibromuscular dysplasia, HTN, who presents as a transfer from outside hospital for ophtho c/s. For the past two days, reports L eye discomfort. Associated photophobia. Denies glasses/contact lens. No trauma. CTA head/neck without acute pathology at OSH. Sent to [facility] for uveitis evaluation. Physical:  EYES: PERRL though very photosensitive, EOM intact, left eye scleral injection.  20/30 vision b/l. 20/30 right eye, 20/25 left eye. +consensual photophobia. Plan: ophtho c/s.

## 2023-09-03 NOTE — ED ADULT NURSE NOTE - NSFALLHARMRISKINTERV_ED_ALL_ED

## 2023-09-03 NOTE — ED ADULT TRIAGE NOTE - ARRIVAL FROM
FDL SHOT PATIENT.    Allergy serum has been mixed.  Please call the patient to schedule a first shot visit with a DR VARGAS OR TARUN IN [facility].  Please remind patient to take antihistamines the morning of shots, bring epi pens and of the 30 minute wait.  Thanks.    CATHI took serum to [facility] office.   Hospitals/Psychiatric Facilities

## 2023-09-03 NOTE — ED PROVIDER NOTE - PHYSICAL EXAMINATION
Vitals: I have reviewed the patients vital signs  General: Well dressed, well appearing, not in acute respiratory distress  HEENT: Atraumatic, normocephalic, airway patent  Eyes: EOMI, tracking appropriately, conjunctival injection noted in the L eye, 3mm pupils equal and reactive bilaterally, photophobia in L eye   Neck: no tracheal deviation, no JVD  Chest/Lungs: symmetric chest rise, speaking in complete sentences, no WOB  Heart: skin and extremities well perfused, regular rate and rhythm  Abdomen: soft, nontender and nondistended   Neuro: A+Ox3, ambulating without difficulty, CN grossly intact  MSK: strength at baseline in all extremities, no muscle wasting or atrophy  Skin: no cyanosis, no jaundice, no new emergent lesions

## 2023-09-03 NOTE — ED ADULT TRIAGE NOTE - CHIEF COMPLAINT QUOTE
Pt ambulatory to triage with steady gait c/o L sided "temporal" headache x 3 days with increased L eye pain and photophobia.  Pt states 2 days ago she noticed drooping to her R eyelid which has since resolved.   Pt also states "my pupils were two different sizes then".   Currently pt with +MARINA pupils 3mm, no facial asymmetry noted.  Pt STEPHENS with equal and adequate strength denies paresthesias.   No difficulty with speech/vision noted.   L eye appears red.   Denies recent infections.   Denies cp/sob/palpitations.  No additional neuro deficits. BN

## 2023-09-03 NOTE — ED ADULT NURSE REASSESSMENT NOTE - NS ED NURSE REASSESS COMMENT FT1
spoke to Ct scan tech- as ct not ready yet, requested CT scan tech to speak with radiologist. patient updated of the CT scan  result delay by MIRANDA strong

## 2023-09-03 NOTE — CONSULT NOTE ADULT - ASSESSMENT
58F with PMH fibromuscular dysplasia, hx of carotid artery dissection, CAD, hypothyroidism, raynaud's, HTN presents as transfer from OSH for left eye pain and photophobia, found to have left eye inferior corneal scar with epi defect and possible infiltrate, 2+ cell/flare OS and unremarkable DFE OS.     # Anterior uveitis, left eye  - Patient with hx hashimoto's, raynaud's, questionable hx of Sjogren's presents with 1-2 days left eye pain and photophobia, found to have 2+ cell/flare OS  - VA intact OU, IOP wnl OU, PERRLA no rAPD  - Left eye with 2+ cell/flare   - Both eyes with inferior corneal scars, left eye corneal scar with overlying epi defect 1.2 mm x 1.3 mm however denying FBS or pain improvement with topical anesthestic gtt. Unlikely bacterial corneal ulcer given appearance and lack of risk factors. Will tx with oflox and erythro as below due to epithelial defect  - Left eye iris with few blood vessels visible, no NVA on gonio OU   - DFE wnl OU   - In the setting of patient's hx of autoimmune diseases and finding of inflammation in left eye, warrants following work-up for uveitis:   - Please send ESR, CRP, CLIFTON, ACE, lysozyme, syphilis panel, lyme panel, Quantiferon, RF, blood culture, HLA-B27.  - Please obtain CXR r/o sarcoidosis.  - Start prednisolone gtt left eye QID (ordered)  - Start cyclogyl gtt left eye TID (ordered)   - Start ofloxacin gtt QID left eye (ordered)   - Start erythromycin ophthalmic ointment qHS left eye (ordered)   - Emphasized with patient that follow up is very important since steroid gtt will be applied to the left eye, discussed that there is a small chance that left eye pressure can become elevated while using steroid eye drops and the only way to monitor is by following up once discharges. Pt expressed understanding.  - Pt to follow up with own ophthalmologist or with below clinic later this week, will arrange if pt cannot secure follow-up with own ophthalmologist     Discussed with Dr. Juarez, chief resident.     Outpatient Follow-up: Patient should follow-up with his/her ophthalmologist or with [facility] Department of Ophthalmology within 1 week of after discharge at:    [address]  [place] [address]  [phone]

## 2023-09-03 NOTE — ED PROVIDER NOTE - TOBACCO USE
She is followed by cardiology. She continues with furosemide 40 mg daily and KLOR-CON 8 mg in the evening. Weight has been stable. No new chest pain, shortness of breath of leg swelling.   Unknown if ever smoked

## 2023-09-03 NOTE — ED ADULT NURSE NOTE - CHIEF COMPLAINT QUOTE
c/o left eye redness and pain since last night some redness noted to he affected eye no lacrimation, sent in by [place] for opthalmology consult and r/o uveitis.

## 2023-09-03 NOTE — ED ADULT NURSE NOTE - OBJECTIVE STATEMENT
Pt received to TRB transfer from [place] for Opthomology consult. Pt st" I have pain reddness cant tolerate the lights for past 2 days. No vision changes. Feeling better now able to tolerate the light. I received medications at [place] which helped. " Resident at bedside. Pt received to TRB transfer from [place] for Opthomology consult. Pt st" I have pain reddness cant tolerate the lights for past 2 days. No vision changes. Feeling better now able to tolerate the light. I received medications at [place] which helped. " + reddness of left eye Resident at bedside.

## 2023-09-03 NOTE — CONSULT NOTE ADULT - SUBJECTIVE AND OBJECTIVE BOX
[facility] DEPARTMENT OF OPHTHALMOLOGY - INITIAL ADULT CONSULT  ----------------------------------------------------------------------------------------------------  Yony Porter PGY-2  Available on teams  ----------------------------------------------------------------------------------------------------    HPI: 58F with PMH fibromuscular dysplasia, hx of carotid artery dissection, CAD, hypothyroidism, raynaud's, HTN presents as transfer from OSH for left eye pain and photophobia. Patient reports yesterday 9/2 she noticed right eye pain worse on palpation of lids and photophobia. Reports these symptoms are waxing and waning, at present feeling minimal discomfort and photophobia however still with pain on palpation of eye. Denies FBS, eye discharge, diplopia, headache or vision loss. Reports her vision has been sharp throughout this time. Denies use of contacts, denies recent outdoor swimming. Reports she has had eye issues and was told she had corneal ulcers before by outpatient ophthalmologists a few years ago and earlier this year (about 7 months ago) however does not remember what she was treated with or what was exactly going on with her eyes. Reports she has had labwork positive for anticardiolipin antibodies before, has a daughter with lupus, pt has not followed up with rheumatology. Reports she was told she has dry eyes and uses artificial tears a few times per day and nighttime lubricating gel in both eyes. Reports that she had diarrheal febrile illness 2-3 weeks ago that resolved. Reports she had right lower eyelid blepharoplasty and resection of chalazion many years ago, otherwise denies eye surgery or lasers.     PAST MEDICAL & SURGICAL HISTORY:  Anticardiolipin Antibody Positive  CAD (Coronary Artery Disease)  Myocardial Infarct  2005, 2018  Hashimoto's thyroiditis  ON  Carotid dissection, bilateral  2011- healed self- TIA- neck cannot be hyper extented  TIA (transient ischemic attack)  2011  Coronary artery spasm  Fibromuscular dysplasia  cannot get HR above 130  Irritable bowel syndrome (IBS)  FMD (frontometaphyseal dysplasia)  right renal artery, iliac artery  Pelvic floor dysfunction  Dissection of mesenteric artery  SMA- healed  History of appendectomy  S/P correction of deviated nasal septum  Congenital pectus excavatum  s/p surgery  S/P bilateral breast implants  age 19 's  S/P saline breast implant  bilateral age 29 's      H/O umbilical hernia repair  H/O cosmetic surgery  face lift 3/2021        Past Ocular History: Hx of corneal ulcers per pt, does not remember exact timing or what drops used to treat  Ophthalmic Medications: AT 2-3x per day OU and AT gel qHS OU   FAMILY HISTORY:    MEDICATIONS  (STANDING):    MEDICATIONS  (PRN):    Allergies & Intolerances:   epidural medications (Rash)  epinephrine (Rash)    Review of Systems:  Constitutional: No fever, chills  Eyes: See HPI   Neuro: No tremors  Cardiovascular: No chest pain, palpitations  Respiratory: No SOB, no cough  GI: No nausea, vomiting, abdominal pain  : No dysuria  Skin: no rash  Psych: no depression  Endocrine: no polyuria, polydipsia  Heme/lymph: no swelling    VITALS: T(C): 36.7 (09-03-23 @ 18:59)  T(F): 98.1 (09-03-23 @ 18:59), Max: 98.1 (09-03-23 @ 18:59)  HR: 66 (09-03-23 @ 18:59) (63 - 71)  BP: 127/71 (09-03-23 @ 18:59) (117/58 - 131/71)  RR:  (18 - 18)  SpO2:  (99% - 100%)  Wt(kg): --  General: AAO x 3, appropriate mood and affect    Ophthalmology Exam:  Visual acuity (sc): OD: 20/20, OS: 20/30 PH to 20/20  Pupils: PERRL OU, no APD  Ttono: 13 OD 15 OS  Extraocular movements (EOMs): Full OU, no pain, no diplopia  Confrontational Visual Field (CVF): Full OU  Color Plates: 12/12 OU    Pen Light Exam (PLE)  External: Flat OU  Lids/Lashes/Lacrimal Ducts: Flat OU   Sclera/Conjunctiva: W+Q OD, 1+ injection OS  Cornea: OD: inferior corneal scar, 1-2+ PEE ;  OS: inferior corneal scar with overlying stain uptake with epi defect 1.3 mm x 1.2 mm with slight surrounding corneal haze, 2-3+ PEE inferior and interpalpebral  Anterior Chamber: OD: D+F, OS: 2+ cell/flare, no hypopion  Iris: Flat OD; OS: flat, irregular vertically oriented oval shaped pupil once dilated, iris with few blood vessels  Lens: mild cataracts OU     Gonio OU:  OD: no NVA, open to TM   OS: no NVA, open to TM     Fundus Exam: dilated with 1% tropicamide and 2.5% phenylephrine  Approval obtained from primary team for dilation  Patient aware that pupils can remained dilated for at least 4-6 hours  Exam performed with 20D lens    Vitreous: wnl OU  Disc, cup/disc: sharp and pink, 0.4 OU  Macula: wnl OU  Vessels: wnl OU  Periphery: wnl OU

## 2023-09-03 NOTE — ED ADULT NURSE REASSESSMENT NOTE - STATUS
Pt seen by [facility], returned to TRB, awaits further treatment plan . MD  at bedside to talk with pt.

## 2023-09-03 NOTE — ED ADULT NURSE NOTE - NSFALLHARMRISKINTERV_ED_ALL_ED

## 2023-09-03 NOTE — ED ADULT TRIAGE NOTE - CHIEF COMPLAINT QUOTE
c/o left eye redness and pain since last night some redness noted ot he affected eye no lacrimation, sent in by [place] for opthalmology consult and r/o uveitis. c/o left eye redness and pain since last night some redness noted to he affected eye no lacrimation, sent in by [place] for opthalmology consult and r/o uveitis.

## 2023-09-03 NOTE — ED PROVIDER NOTE - PHYSICAL EXAMINATION
Constitutional: Awake, Alert, non-toxic. No acute distress.  HEAD: Normocephalic, atraumatic.   EYES: PERRL though very photosensitive, EOM intact, left eye scleral injection. minimal TTP left temporal area. 20/30 vision b/l. 20/30 right eye, 20/25 left eye. right eye pressure 17mmhg, left eye pressure 14mmhg.  ENT: External ears normal. No rhinorrhea, no tracheal deviation   NECK: Supple, non-tender  CARDIOVASCULAR: regular rate and rhythm.  RESPIRATORY: Normal respiratory effort; breath sounds CTAB, no wheezes, rhonchi, or rales. Speaking in full sentences. No accessory muscle use.   ABDOMEN: Soft; non-tender, non-distended. No rebound or guarding.   MSK:  no lower extremity edema, no deformities  SKIN: Warm, dry  NEURO: A&O x3. Sensory and motor functions are grossly intact. Speech is normal. CN 2-12 in tact. No facial droop. Normal finger to nose. Negative pronator drift. no leg drift. 5/5 strength in bilateral upper and lower extremities. Sensation in tact to light touch in bilateral upper and lower extremities.  PSYCH: Appearance and judgement seem appropriate for gender and age.

## 2023-09-03 NOTE — ED ADULT NURSE REASSESSMENT NOTE - NS ED NURSE REASSESS COMMENT FT1
patient concerned over what time the ophthalmologist will be present at [facility]- Dr pack spoke to the ophthalmologist and patient updated Maryellen Pack, transfer team called and patient updated about their arrival- patient concerned over what time the ophthalmologist will be present at [facility]- Dr pack spoke to the ophthalmologist and patient updated by Dr Pack, transfer team called and patient updated about their arrival- patient also wants IV access out, Dr Pack made aware, patient ,made aware that they might have to reinsert the IV access at [facility] if they need to. Patient agrees to that.

## 2023-09-03 NOTE — ED PROVIDER NOTE - OBJECTIVE STATEMENT
Patient with a past medical history of fibromuscular dysplasia, previous carotid artery dissection, coronary artery disease on Plavix, hypertension, on verapamil is presenting here with concern for intermittent headache over the past 2 days and worsening left eye pain and photophobia.  States 2 days ago she had concern for ptosis of the right eyelid.  States this happened to her once before when she had her carotid artery dissection.  Denies any nausea or vomiting.  No fevers.  States she feels pressure when pressing on her left eye.  No chest pain or shortness of breath. Vascular physician is Dr. Talbot. Glenn Fitzpatrick. BABATUNDE Foster.

## 2023-09-03 NOTE — ED PROVIDER NOTE - NSFOLLOWUPINSTRUCTIONS_ED_ALL_ED_FT
You were seen in the emergency department for left pain redness and pain. Your workup in the emergency department includes bloodwork and evaluation by ophthalmology. The presumed diagnosis is uveitis.     Please follow up with your primary care doctor within 48 hours for continuation of care.   Please follow up with ophthalmology within 2-3 days for further management.   [address]  [place] [address]  [phone]    You are given eye medications, below are the instructions:  - Start prednisolone gtt left eye every 6 hours   - Start cyclogyl gtt left eye every 8 hours    - Start ofloxacin gtt left eye every 6 hours   - Start erythromycin ophthalmic ointment left eye at bedtime    Return to the emergency department if you experience any new/concerning/worsening symptoms such as but not limited to: fever (>100.3F), intractable nausea, vomiting, chest pain, shortness of breath, abdominal pain, worsening eye pain and redness.

## 2023-09-03 NOTE — ED PROVIDER NOTE - PROGRESS NOTE DETAILS
Received phone call from radiologist regarding CT scan findings.  Discussed this with patient at bedside.  Given still concern for iritis, case was discussed with on-call ophthalmology.  Will transfer to [facility].  Consent form signed.  Sandoval Gauthier MD.

## 2023-09-03 NOTE — ED PROVIDER NOTE - OBJECTIVE STATEMENT
Patient is a 58 year-old-female with history of CAD, HTN, fibromuscular dysplasia and previous carotid artery dissection presents as a transfer from [place] for ophthalmology consult to r/o uveitis. Initially presented to [place] ED for 2-day history of worsening left eye pain and photophobia. CTA head/neck performed there did not show acute pathologies and subsequently transferred here to evaluate for uveitis. Patient reports pain to the L eye with redness. Denies recent fever, chills, nausea, vomiting, chest pain, shortness of breath.

## 2023-09-03 NOTE — ED PROVIDER NOTE - PROGRESS NOTE DETAILS
YOAN NIEVES: patient evaluated at bedside. Ophthalmology at bedside. David PGY3  Medications ordered by ophthalmology and will send off bloodwork per ophth. David PGY3  CXR showed clear lungs

## 2023-09-03 NOTE — ED PROVIDER NOTE - PATIENT PORTAL LINK FT
You can access the FollowMyHealth Patient Portal offered by [facility] by registering at the following website: [email or web address]. By joining [employer]’s FollowMyHealth portal, you will also be able to view your health information using other applications (apps) compatible with our system.

## 2023-09-04 LAB
B BURGDOR C6 AB SER-ACNC: NEGATIVE — SIGNIFICANT CHANGE UP
B BURGDOR IGG+IGM SER-ACNC: 0.05 INDEX — SIGNIFICANT CHANGE UP (ref 0.01–0.89)
RHEUMATOID FACT SERPL-ACNC: <10 IU/ML — SIGNIFICANT CHANGE UP (ref 0–13)
T PALLIDUM AB TITR SER: NEGATIVE — SIGNIFICANT CHANGE UP

## 2023-09-05 LAB
ACE SERPL-CCNC: 68 U/L — SIGNIFICANT CHANGE UP (ref 14–82)
GAMMA INTERFERON BACKGROUND BLD IA-ACNC: 0.08 IU/ML — SIGNIFICANT CHANGE UP
M TB IFN-G BLD-IMP: NEGATIVE — SIGNIFICANT CHANGE UP
M TB IFN-G CD4+ BCKGRND COR BLD-ACNC: -0.02 IU/ML — SIGNIFICANT CHANGE UP
M TB IFN-G CD4+CD8+ BCKGRND COR BLD-ACNC: -0.02 IU/ML — SIGNIFICANT CHANGE UP
QUANT TB PLUS MITOGEN MINUS NIL: 9.28 IU/ML — SIGNIFICANT CHANGE UP

## 2023-09-06 ENCOUNTER — APPOINTMENT (OUTPATIENT)
Dept: OPHTHALMOLOGY | Facility: CLINIC | Age: 58
End: 2023-09-06
Payer: COMMERCIAL

## 2023-09-06 ENCOUNTER — NON-APPOINTMENT (OUTPATIENT)
Age: 58
End: 2023-09-06

## 2023-09-06 LAB
ANA TITR SER: NEGATIVE — SIGNIFICANT CHANGE UP
LYSOZYME SERPL-MCNC: 6.9 UG/ML — SIGNIFICANT CHANGE UP (ref 3.4–7.6)

## 2023-09-06 PROCEDURE — 92004 COMPRE OPH EXAM NEW PT 1/>: CPT

## 2023-09-06 PROCEDURE — 92133 CPTRZD OPH DX IMG PST SGM ON: CPT

## 2023-09-07 ENCOUNTER — NON-APPOINTMENT (OUTPATIENT)
Age: 58
End: 2023-09-07

## 2023-09-07 LAB — B BURGDOR DNA SPEC QL NAA+PROBE: NEGATIVE — SIGNIFICANT CHANGE UP

## 2023-09-08 ENCOUNTER — APPOINTMENT (OUTPATIENT)
Dept: OPHTHALMOLOGY | Facility: CLINIC | Age: 58
End: 2023-09-08
Payer: COMMERCIAL

## 2023-09-08 ENCOUNTER — NON-APPOINTMENT (OUTPATIENT)
Age: 58
End: 2023-09-08

## 2023-09-08 PROCEDURE — 92012 INTRM OPH EXAM EST PATIENT: CPT

## 2023-09-08 PROCEDURE — 92285 EXTERNAL OCULAR PHOTOGRAPHY: CPT

## 2023-09-09 LAB
CULTURE RESULTS: SIGNIFICANT CHANGE UP
CULTURE RESULTS: SIGNIFICANT CHANGE UP
SPECIMEN SOURCE: SIGNIFICANT CHANGE UP
SPECIMEN SOURCE: SIGNIFICANT CHANGE UP

## 2023-09-13 ENCOUNTER — NON-APPOINTMENT (OUTPATIENT)
Age: 58
End: 2023-09-13

## 2023-09-13 ENCOUNTER — APPOINTMENT (OUTPATIENT)
Dept: OPHTHALMOLOGY | Facility: CLINIC | Age: 58
End: 2023-09-13
Payer: COMMERCIAL

## 2023-09-13 PROCEDURE — 92012 INTRM OPH EXAM EST PATIENT: CPT

## 2023-09-15 ENCOUNTER — APPOINTMENT (OUTPATIENT)
Dept: OPHTHALMOLOGY | Facility: CLINIC | Age: 58
End: 2023-09-15
Payer: COMMERCIAL

## 2023-09-15 ENCOUNTER — EMERGENCY (EMERGENCY)
Facility: HOSPITAL | Age: 58
LOS: 1 days | Discharge: HOME | End: 2023-09-15
Attending: EMERGENCY MEDICINE | Admitting: EMERGENCY MEDICINE
Payer: COMMERCIAL

## 2023-09-15 ENCOUNTER — NON-APPOINTMENT (OUTPATIENT)
Age: 58
End: 2023-09-15

## 2023-09-15 VITALS
HEART RATE: 56 BPM | OXYGEN SATURATION: 100 % | RESPIRATION RATE: 18 BRPM | DIASTOLIC BLOOD PRESSURE: 73 MMHG | SYSTOLIC BLOOD PRESSURE: 112 MMHG

## 2023-09-15 VITALS
DIASTOLIC BLOOD PRESSURE: 82 MMHG | TEMPERATURE: 98 F | RESPIRATION RATE: 18 BRPM | WEIGHT: 115.08 LBS | HEART RATE: 106 BPM | SYSTOLIC BLOOD PRESSURE: 132 MMHG | OXYGEN SATURATION: 98 % | HEIGHT: 64 IN

## 2023-09-15 DIAGNOSIS — Z98.82 BREAST IMPLANT STATUS: Chronic | ICD-10-CM

## 2023-09-15 DIAGNOSIS — Q67.6 PECTUS EXCAVATUM: Chronic | ICD-10-CM

## 2023-09-15 DIAGNOSIS — Z98.890 OTHER SPECIFIED POSTPROCEDURAL STATES: Chronic | ICD-10-CM

## 2023-09-15 DIAGNOSIS — Z90.49 ACQUIRED ABSENCE OF OTHER SPECIFIED PARTS OF DIGESTIVE TRACT: Chronic | ICD-10-CM

## 2023-09-15 LAB
ALBUMIN SERPL ELPH-MCNC: 4 G/DL — SIGNIFICANT CHANGE UP (ref 3.3–5)
ALP SERPL-CCNC: 62 U/L — SIGNIFICANT CHANGE UP (ref 40–120)
ALT FLD-CCNC: 17 U/L — SIGNIFICANT CHANGE UP (ref 10–45)
ANION GAP SERPL CALC-SCNC: 4 MMOL/L — LOW (ref 5–17)
APTT BLD: 35.8 SEC — HIGH (ref 24.5–35.6)
AST SERPL-CCNC: 18 U/L — SIGNIFICANT CHANGE UP (ref 10–40)
BASOPHILS # BLD AUTO: 0.04 K/UL — SIGNIFICANT CHANGE UP (ref 0–0.2)
BASOPHILS NFR BLD AUTO: 0.7 % — SIGNIFICANT CHANGE UP (ref 0–2)
BILIRUB SERPL-MCNC: 0.3 MG/DL — SIGNIFICANT CHANGE UP (ref 0.2–1.2)
BUN SERPL-MCNC: 8 MG/DL — SIGNIFICANT CHANGE UP (ref 7–23)
CALCIUM SERPL-MCNC: 9.3 MG/DL — SIGNIFICANT CHANGE UP (ref 8.4–10.5)
CHLORIDE SERPL-SCNC: 102 MMOL/L — SIGNIFICANT CHANGE UP (ref 96–108)
CO2 SERPL-SCNC: 34 MMOL/L — HIGH (ref 22–31)
CREAT SERPL-MCNC: 0.94 MG/DL — SIGNIFICANT CHANGE UP (ref 0.5–1.3)
EGFR: 70 ML/MIN/1.73M2 — SIGNIFICANT CHANGE UP
EOSINOPHIL # BLD AUTO: 0.17 K/UL — SIGNIFICANT CHANGE UP (ref 0–0.5)
EOSINOPHIL NFR BLD AUTO: 3 % — SIGNIFICANT CHANGE UP (ref 0–6)
GLUCOSE SERPL-MCNC: 93 MG/DL — SIGNIFICANT CHANGE UP (ref 70–99)
HCT VFR BLD CALC: 40.5 % — SIGNIFICANT CHANGE UP (ref 34.5–45)
HGB BLD-MCNC: 13.6 G/DL — SIGNIFICANT CHANGE UP (ref 11.5–15.5)
IMM GRANULOCYTES NFR BLD AUTO: 0.2 % — SIGNIFICANT CHANGE UP (ref 0–0.9)
INR BLD: 0.89 RATIO — SIGNIFICANT CHANGE UP (ref 0.85–1.18)
LYMPHOCYTES # BLD AUTO: 2.07 K/UL — SIGNIFICANT CHANGE UP (ref 1–3.3)
LYMPHOCYTES # BLD AUTO: 36.4 % — SIGNIFICANT CHANGE UP (ref 13–44)
MCHC RBC-ENTMCNC: 31.6 PG — SIGNIFICANT CHANGE UP (ref 27–34)
MCHC RBC-ENTMCNC: 33.6 GM/DL — SIGNIFICANT CHANGE UP (ref 32–36)
MCV RBC AUTO: 94.2 FL — SIGNIFICANT CHANGE UP (ref 80–100)
MONOCYTES # BLD AUTO: 0.67 K/UL — SIGNIFICANT CHANGE UP (ref 0–0.9)
MONOCYTES NFR BLD AUTO: 11.8 % — SIGNIFICANT CHANGE UP (ref 2–14)
NEUTROPHILS # BLD AUTO: 2.72 K/UL — SIGNIFICANT CHANGE UP (ref 1.8–7.4)
NEUTROPHILS NFR BLD AUTO: 47.9 % — SIGNIFICANT CHANGE UP (ref 43–77)
NRBC # BLD: 0 /100 WBCS — SIGNIFICANT CHANGE UP (ref 0–0)
PLATELET # BLD AUTO: 231 K/UL — SIGNIFICANT CHANGE UP (ref 150–400)
POTASSIUM SERPL-MCNC: 3.3 MMOL/L — LOW (ref 3.5–5.3)
POTASSIUM SERPL-SCNC: 3.3 MMOL/L — LOW (ref 3.5–5.3)
PROT SERPL-MCNC: 6.8 G/DL — SIGNIFICANT CHANGE UP (ref 6–8.3)
PROTHROM AB SERPL-ACNC: 10.5 SEC — SIGNIFICANT CHANGE UP (ref 9.5–13)
RBC # BLD: 4.3 M/UL — SIGNIFICANT CHANGE UP (ref 3.8–5.2)
RBC # FLD: 11.8 % — SIGNIFICANT CHANGE UP (ref 10.3–14.5)
SODIUM SERPL-SCNC: 140 MMOL/L — SIGNIFICANT CHANGE UP (ref 135–145)
TROPONIN I, HIGH SENSITIVITY RESULT: 6.4 NG/L — SIGNIFICANT CHANGE UP
WBC # BLD: 5.68 K/UL — SIGNIFICANT CHANGE UP (ref 3.8–10.5)
WBC # FLD AUTO: 5.68 K/UL — SIGNIFICANT CHANGE UP (ref 3.8–10.5)

## 2023-09-15 PROCEDURE — 70496 CT ANGIOGRAPHY HEAD: CPT | Mod: 26,MA

## 2023-09-15 PROCEDURE — 70496 CT ANGIOGRAPHY HEAD: CPT | Mod: MA

## 2023-09-15 PROCEDURE — 93005 ELECTROCARDIOGRAM TRACING: CPT

## 2023-09-15 PROCEDURE — 85730 THROMBOPLASTIN TIME PARTIAL: CPT

## 2023-09-15 PROCEDURE — 84484 ASSAY OF TROPONIN QUANT: CPT

## 2023-09-15 PROCEDURE — 99285 EMERGENCY DEPT VISIT HI MDM: CPT | Mod: 25

## 2023-09-15 PROCEDURE — 80053 COMPREHEN METABOLIC PANEL: CPT

## 2023-09-15 PROCEDURE — 93010 ELECTROCARDIOGRAM REPORT: CPT

## 2023-09-15 PROCEDURE — 70498 CT ANGIOGRAPHY NECK: CPT | Mod: 26,MA

## 2023-09-15 PROCEDURE — 70450 CT HEAD/BRAIN W/O DYE: CPT | Mod: 26,MA,59

## 2023-09-15 PROCEDURE — 70450 CT HEAD/BRAIN W/O DYE: CPT | Mod: MA

## 2023-09-15 PROCEDURE — 36415 COLL VENOUS BLD VENIPUNCTURE: CPT

## 2023-09-15 PROCEDURE — 92012 INTRM OPH EXAM EST PATIENT: CPT

## 2023-09-15 PROCEDURE — 85025 COMPLETE CBC W/AUTO DIFF WBC: CPT

## 2023-09-15 PROCEDURE — 70498 CT ANGIOGRAPHY NECK: CPT | Mod: MA

## 2023-09-15 PROCEDURE — 82962 GLUCOSE BLOOD TEST: CPT

## 2023-09-15 PROCEDURE — 85610 PROTHROMBIN TIME: CPT

## 2023-09-15 PROCEDURE — 99285 EMERGENCY DEPT VISIT HI MDM: CPT

## 2023-09-15 RX ORDER — ESZOPICLONE 2 MG/1
1 TABLET, COATED ORAL
Qty: 8 | Refills: 0
Start: 2023-09-15 | End: 2023-09-22

## 2023-09-15 RX ORDER — POTASSIUM CHLORIDE 20 MEQ
2 PACKET (EA) ORAL
Qty: 42 | Refills: 0
Start: 2023-09-15 | End: 2023-10-05

## 2023-09-15 NOTE — ED PROVIDER NOTE - PROGRESS NOTE DETAILS
All results were explained to patient and/or family and a copy of all available results given. Offered pain med, but patient deferred stating that headache was 2/10

## 2023-09-15 NOTE — ED PROVIDER NOTE - PATIENT PORTAL LINK FT
You can access the FollowMyHealth Patient Portal offered by [facility] by registering at the following website: [email or web address]. By joining [employer]’s FollowMyHealth portal, you will also be able to view your health information using other applications (apps) compatible with our system.

## 2023-09-15 NOTE — ED PROVIDER NOTE - OBJECTIVE STATEMENT
Left temporal headache since 7pm last night and severe headache since 3am today.  pt woke up at 6am c headache.  HO right eyelid droop.  No n/v/d, fever.    Dx c fmd 2011.  pt was at [place] for left eye pain dx c uveitis and corneal ulcer this past Labor Day.  pt took Tylenol this am.

## 2023-09-15 NOTE — ED ADULT TRIAGE NOTE - CHIEF COMPLAINT QUOTE
Pt c/o headache and right eyebrow droop since last night. Pt also states her left eyelid was half opened.

## 2023-09-15 NOTE — ED PROVIDER NOTE - ENMT, MLM
16-Nov-2018 10:39 Airway patent, Nasal mucosa clear. Mouth with normal mucosa. Throat has no vesicles, no oropharyngeal exudates and uvula is midline.

## 2023-09-15 NOTE — ED PROVIDER NOTE - NSFOLLOWUPINSTRUCTIONS_ED_ALL_ED_FT
Patient has been on Lunesta for almost 10 years and would benefit from a tapering dose of Lunesta to minimize withdrawal symptoms.   I  prescribed Lunesta 1mg at bedtime for Day 1-5 and 1/2 tab at bedtime for Day 6-10.

## 2023-09-15 NOTE — ED ADULT NURSE NOTE - NSFALLUNIVINTERV_ED_ALL_ED
Bed/Stretcher in lowest position, wheels locked, appropriate side rails in place/Call bell, personal items and telephone in reach/Instruct patient to call for assistance before getting out of bed/chair/stretcher/Non-slip footwear applied when patient is off stretcher/[place] to call system/Physically safe environment - no spills, clutter or unnecessary equipment/Purposeful proactive rounding/Room/bathroom lighting operational, light cord in reach

## 2023-09-20 ENCOUNTER — APPOINTMENT (OUTPATIENT)
Dept: OPHTHALMOLOGY | Facility: CLINIC | Age: 58
End: 2023-09-20

## 2023-09-22 ENCOUNTER — APPOINTMENT (OUTPATIENT)
Dept: PLASTIC SURGERY | Facility: CLINIC | Age: 58
End: 2023-09-22

## 2023-09-28 ENCOUNTER — APPOINTMENT (OUTPATIENT)
Dept: ULTRASOUND IMAGING | Facility: CLINIC | Age: 58
End: 2023-09-28

## 2023-09-28 ENCOUNTER — APPOINTMENT (OUTPATIENT)
Dept: MAMMOGRAPHY | Facility: CLINIC | Age: 58
End: 2023-09-28

## 2023-10-04 ENCOUNTER — APPOINTMENT (OUTPATIENT)
Dept: INTERNAL MEDICINE | Facility: CLINIC | Age: 58
End: 2023-10-04

## 2023-10-26 ENCOUNTER — APPOINTMENT (OUTPATIENT)
Dept: SURGERY | Facility: CLINIC | Age: 58
End: 2023-10-26
Payer: COMMERCIAL

## 2023-10-26 VITALS
SYSTOLIC BLOOD PRESSURE: 108 MMHG | DIASTOLIC BLOOD PRESSURE: 63 MMHG | BODY MASS INDEX: 19.12 KG/M2 | WEIGHT: 112 LBS | HEIGHT: 64 IN

## 2023-10-26 DIAGNOSIS — E06.3 AUTOIMMUNE THYROIDITIS: ICD-10-CM

## 2023-10-26 DIAGNOSIS — Z78.9 OTHER SPECIFIED HEALTH STATUS: ICD-10-CM

## 2023-10-26 DIAGNOSIS — E04.1 NONTOXIC SINGLE THYROID NODULE: ICD-10-CM

## 2023-10-26 PROCEDURE — 99203 OFFICE O/P NEW LOW 30 MIN: CPT

## 2023-10-26 RX ORDER — LAMOTRIGINE 25 MG/1
25 TABLET ORAL
Refills: 0 | Status: ACTIVE | COMMUNITY

## 2023-10-30 ENCOUNTER — LABORATORY RESULT (OUTPATIENT)
Age: 58
End: 2023-10-30

## 2023-10-30 ENCOUNTER — APPOINTMENT (OUTPATIENT)
Dept: INTERNAL MEDICINE | Facility: CLINIC | Age: 58
End: 2023-10-30
Payer: COMMERCIAL

## 2023-10-30 ENCOUNTER — RX RENEWAL (OUTPATIENT)
Age: 58
End: 2023-10-30

## 2023-10-30 VITALS
BODY MASS INDEX: 18.44 KG/M2 | TEMPERATURE: 98 F | DIASTOLIC BLOOD PRESSURE: 64 MMHG | HEIGHT: 64 IN | HEART RATE: 68 BPM | OXYGEN SATURATION: 96 % | RESPIRATION RATE: 14 BRPM | WEIGHT: 108 LBS | SYSTOLIC BLOOD PRESSURE: 96 MMHG

## 2023-10-30 PROCEDURE — 99214 OFFICE O/P EST MOD 30 MIN: CPT | Mod: 25

## 2023-10-30 PROCEDURE — 36415 COLL VENOUS BLD VENIPUNCTURE: CPT

## 2023-10-31 LAB
ALBUMIN SERPL ELPH-MCNC: 4.8 G/DL
ALP BLD-CCNC: 63 U/L
ALT SERPL-CCNC: 12 U/L
ANION GAP SERPL CALC-SCNC: 10 MMOL/L
AST SERPL-CCNC: 23 U/L
BILIRUB SERPL-MCNC: 0.3 MG/DL
BUN SERPL-MCNC: 10 MG/DL
CALCIUM SERPL-MCNC: 9.7 MG/DL
CHLORIDE SERPL-SCNC: 105 MMOL/L
CO2 SERPL-SCNC: 27 MMOL/L
CREAT SERPL-MCNC: 0.79 MG/DL
EGFR: 86 ML/MIN/1.73M2
FERRITIN SERPL-MCNC: 147 NG/ML
GLUCOSE SERPL-MCNC: 74 MG/DL
HCT VFR BLD CALC: 40.6 %
HGB BLD-MCNC: 13.4 G/DL
IRON SATN MFR SERPL: 39 %
IRON SERPL-MCNC: 110 UG/DL
MCHC RBC-ENTMCNC: 31.8 PG
MCHC RBC-ENTMCNC: 33 GM/DL
MCV RBC AUTO: 96.4 FL
PLATELET # BLD AUTO: 232 K/UL
POTASSIUM SERPL-SCNC: 4.4 MMOL/L
PROT SERPL-MCNC: 6.8 G/DL
RBC # BLD: 4.21 M/UL
RBC # FLD: 12.4 %
SODIUM SERPL-SCNC: 142 MMOL/L
TIBC SERPL-MCNC: 282 UG/DL
UIBC SERPL-MCNC: 172 UG/DL
WBC # FLD AUTO: 6.01 K/UL

## 2023-11-01 ENCOUNTER — APPOINTMENT (OUTPATIENT)
Dept: GASTROENTEROLOGY | Facility: CLINIC | Age: 58
End: 2023-11-01
Payer: COMMERCIAL

## 2023-11-01 VITALS
OXYGEN SATURATION: 100 % | BODY MASS INDEX: 18.44 KG/M2 | WEIGHT: 108 LBS | RESPIRATION RATE: 16 BRPM | SYSTOLIC BLOOD PRESSURE: 97 MMHG | DIASTOLIC BLOOD PRESSURE: 66 MMHG | TEMPERATURE: 98 F | HEART RATE: 64 BPM | HEIGHT: 64 IN

## 2023-11-01 DIAGNOSIS — K59.00 CONSTIPATION, UNSPECIFIED: ICD-10-CM

## 2023-11-01 DIAGNOSIS — Z87.19 PERSONAL HISTORY OF OTHER DISEASES OF THE DIGESTIVE SYSTEM: ICD-10-CM

## 2023-11-01 PROCEDURE — 99205 OFFICE O/P NEW HI 60 MIN: CPT

## 2023-11-01 RX ORDER — ACAMPROSATE CALCIUM 333 MG/1
333 TABLET, DELAYED RELEASE ORAL
Refills: 0 | Status: ACTIVE | COMMUNITY
Start: 2023-11-01

## 2023-11-01 RX ORDER — VIBRATING DEVICE, CONSTIPATION
CAPSULE ORAL
Qty: 1 | Refills: 0 | Status: ACTIVE | COMMUNITY
Start: 2023-11-01 | End: 1900-01-01

## 2023-11-01 RX ORDER — FLUTICASONE PROPIONATE 50 UG/1
50 SPRAY, METERED NASAL DAILY
Qty: 1 | Refills: 3 | Status: DISCONTINUED | COMMUNITY
Start: 2022-10-06 | End: 2023-11-01

## 2023-11-01 RX ORDER — FAMOTIDINE 20 MG/1
20 TABLET, FILM COATED ORAL
Qty: 90 | Refills: 3 | Status: DISCONTINUED | COMMUNITY
Start: 2023-05-18 | End: 2023-11-01

## 2023-11-01 RX ORDER — CLOTRIMAZOLE AND BETAMETHASONE DIPROPIONATE 10; .5 MG/G; MG/G
CREAM TOPICAL
Refills: 0 | Status: DISCONTINUED | COMMUNITY
End: 2023-11-01

## 2023-11-02 ENCOUNTER — APPOINTMENT (OUTPATIENT)
Dept: ULTRASOUND IMAGING | Facility: CLINIC | Age: 58
End: 2023-11-02
Payer: COMMERCIAL

## 2023-11-02 PROCEDURE — 76700 US EXAM ABDOM COMPLETE: CPT

## 2023-11-03 ENCOUNTER — RESULT REVIEW (OUTPATIENT)
Age: 58
End: 2023-11-03

## 2023-11-03 ENCOUNTER — APPOINTMENT (OUTPATIENT)
Dept: MAMMOGRAPHY | Facility: CLINIC | Age: 58
End: 2023-11-03
Payer: COMMERCIAL

## 2023-11-03 ENCOUNTER — APPOINTMENT (OUTPATIENT)
Dept: ULTRASOUND IMAGING | Facility: CLINIC | Age: 58
End: 2023-11-03
Payer: COMMERCIAL

## 2023-11-03 PROCEDURE — G0279: CPT

## 2023-11-03 PROCEDURE — 76641 ULTRASOUND BREAST COMPLETE: CPT | Mod: 50

## 2023-11-03 PROCEDURE — 77066 DX MAMMO INCL CAD BI: CPT

## 2023-11-04 LAB — CELIACPAN: NORMAL

## 2023-11-06 ENCOUNTER — NON-APPOINTMENT (OUTPATIENT)
Age: 58
End: 2023-11-06

## 2023-11-10 ENCOUNTER — APPOINTMENT (OUTPATIENT)
Dept: PLASTIC SURGERY | Facility: CLINIC | Age: 58
End: 2023-11-10

## 2023-11-10 ENCOUNTER — APPOINTMENT (OUTPATIENT)
Dept: GASTROENTEROLOGY | Facility: CLINIC | Age: 58
End: 2023-11-10
Payer: COMMERCIAL

## 2023-11-10 VITALS
DIASTOLIC BLOOD PRESSURE: 78 MMHG | RESPIRATION RATE: 16 BRPM | HEIGHT: 64 IN | HEART RATE: 60 BPM | BODY MASS INDEX: 18.78 KG/M2 | WEIGHT: 110 LBS | OXYGEN SATURATION: 100 % | SYSTOLIC BLOOD PRESSURE: 118 MMHG

## 2023-11-10 DIAGNOSIS — R19.7 DIARRHEA, UNSPECIFIED: ICD-10-CM

## 2023-11-10 DIAGNOSIS — Z12.11 ENCOUNTER FOR SCREENING FOR MALIGNANT NEOPLASM OF COLON: ICD-10-CM

## 2023-11-10 DIAGNOSIS — R10.13 EPIGASTRIC PAIN: ICD-10-CM

## 2023-11-10 PROCEDURE — 99215 OFFICE O/P EST HI 40 MIN: CPT

## 2023-11-10 RX ORDER — DULOXETINE HYDROCHLORIDE 30 MG/1
30 CAPSULE, DELAYED RELEASE PELLETS ORAL
Qty: 30 | Refills: 0 | Status: COMPLETED | COMMUNITY
Start: 2021-07-20 | End: 2023-11-10

## 2023-11-10 RX ORDER — ALPRAZOLAM 0.5 MG/1
0.5 TABLET, EXTENDED RELEASE ORAL
Refills: 0 | Status: COMPLETED | COMMUNITY
Start: 2017-08-09 | End: 2023-11-10

## 2023-11-10 RX ORDER — ESZOPICLONE 3 MG/1
3 TABLET, FILM COATED ORAL
Qty: 30 | Refills: 0 | Status: COMPLETED | COMMUNITY
Start: 2021-07-20 | End: 2023-11-10

## 2023-11-10 RX ORDER — MINOCYCLINE HYDROCHLORIDE 100 MG/1
100 CAPSULE ORAL TWICE DAILY
Qty: 20 | Refills: 0 | Status: COMPLETED | COMMUNITY
Start: 2023-06-20 | End: 2023-11-10

## 2023-11-10 RX ORDER — SERTRALINE HYDROCHLORIDE 50 MG/1
50 TABLET, FILM COATED ORAL DAILY
Refills: 0 | Status: COMPLETED | COMMUNITY
Start: 2021-07-27 | End: 2023-11-10

## 2023-11-10 RX ORDER — CLOPIDOGREL BISULFATE 75 MG/1
75 TABLET, FILM COATED ORAL DAILY
Qty: 90 | Refills: 1 | Status: COMPLETED | COMMUNITY
Start: 2020-07-10 | End: 2023-11-10

## 2023-11-29 RX ORDER — CAMPHOR 0.45 %
25 GEL (GRAM) TOPICAL
Qty: 2 | Refills: 0 | Status: ACTIVE | COMMUNITY
Start: 2023-11-29 | End: 1900-01-01

## 2023-11-29 RX ORDER — PREDNISONE 50 MG/1
50 TABLET ORAL
Qty: 3 | Refills: 0 | Status: ACTIVE | COMMUNITY
Start: 2023-11-29 | End: 1900-01-01

## 2023-11-30 ENCOUNTER — NON-APPOINTMENT (OUTPATIENT)
Age: 58
End: 2023-11-30

## 2023-12-01 ENCOUNTER — APPOINTMENT (OUTPATIENT)
Dept: PLASTIC SURGERY | Facility: CLINIC | Age: 58
End: 2023-12-01
Payer: COMMERCIAL

## 2023-12-01 PROCEDURE — 99214 OFFICE O/P EST MOD 30 MIN: CPT

## 2023-12-04 ENCOUNTER — APPOINTMENT (OUTPATIENT)
Dept: GASTROENTEROLOGY | Facility: CLINIC | Age: 58
End: 2023-12-04
Payer: COMMERCIAL

## 2023-12-04 VITALS
WEIGHT: 110 LBS | HEIGHT: 64 IN | BODY MASS INDEX: 18.78 KG/M2 | SYSTOLIC BLOOD PRESSURE: 115 MMHG | HEART RATE: 65 BPM | DIASTOLIC BLOOD PRESSURE: 68 MMHG | OXYGEN SATURATION: 99 %

## 2023-12-04 DIAGNOSIS — K59.09 OTHER CONSTIPATION: ICD-10-CM

## 2023-12-04 DIAGNOSIS — Z86.010 PERSONAL HISTORY OF COLONIC POLYPS: ICD-10-CM

## 2023-12-04 DIAGNOSIS — K58.1 IRRITABLE BOWEL SYNDROME WITH CONSTIPATION: ICD-10-CM

## 2023-12-04 PROCEDURE — 99214 OFFICE O/P EST MOD 30 MIN: CPT

## 2023-12-04 RX ORDER — LINACLOTIDE 145 UG/1
145 CAPSULE, GELATIN COATED ORAL
Qty: 30 | Refills: 5 | Status: ACTIVE | COMMUNITY
Start: 2023-12-04 | End: 1900-01-01

## 2023-12-21 ENCOUNTER — APPOINTMENT (OUTPATIENT)
Dept: CARDIOLOGY | Facility: CLINIC | Age: 58
End: 2023-12-21

## 2023-12-27 ENCOUNTER — APPOINTMENT (OUTPATIENT)
Dept: GASTROENTEROLOGY | Facility: HOSPITAL | Age: 58
End: 2023-12-27

## 2023-12-27 ENCOUNTER — APPOINTMENT (OUTPATIENT)
Dept: INTERNAL MEDICINE | Facility: CLINIC | Age: 58
End: 2023-12-27

## 2024-01-16 RX ORDER — CONJUGATED ESTROGENS 0.62 MG/G
0.62 CREAM VAGINAL
Qty: 3 | Refills: 3 | Status: ACTIVE | COMMUNITY
Start: 2020-01-17 | End: 1900-01-01

## 2024-01-18 ENCOUNTER — APPOINTMENT (OUTPATIENT)
Dept: UROLOGY | Facility: CLINIC | Age: 59
End: 2024-01-18

## 2024-01-19 ENCOUNTER — APPOINTMENT (OUTPATIENT)
Dept: PLASTIC SURGERY | Facility: CLINIC | Age: 59
End: 2024-01-19

## 2024-01-20 ENCOUNTER — RX RENEWAL (OUTPATIENT)
Age: 59
End: 2024-01-20

## 2024-01-27 ENCOUNTER — NON-APPOINTMENT (OUTPATIENT)
Age: 59
End: 2024-01-27

## 2024-01-29 ENCOUNTER — APPOINTMENT (OUTPATIENT)
Dept: CARDIOLOGY | Facility: CLINIC | Age: 59
End: 2024-01-29

## 2024-02-02 ENCOUNTER — NON-APPOINTMENT (OUTPATIENT)
Age: 59
End: 2024-02-02

## 2024-03-08 ENCOUNTER — APPOINTMENT (OUTPATIENT)
Dept: PLASTIC SURGERY | Facility: CLINIC | Age: 59
End: 2024-03-08
Payer: COMMERCIAL

## 2024-03-08 PROCEDURE — 99214 OFFICE O/P EST MOD 30 MIN: CPT

## 2024-03-13 ENCOUNTER — APPOINTMENT (OUTPATIENT)
Dept: ULTRASOUND IMAGING | Facility: CLINIC | Age: 59
End: 2024-03-13

## 2024-03-18 NOTE — ADDENDUM
[FreeTextEntry1] :  I, Stephanie Smallwood, documented this note as a scribe on behalf of Dr. Lauren Shikowitz-Behr, MD on 03/08/2024.

## 2024-03-18 NOTE — PHYSICAL EXAM
[NI] : Normal [de-identified] : NAD, AxOx3 [de-identified] : nonlabored breathing  [de-identified] : Bilateral breasts: no masses, no nipple discharge nor retraction.  Right breast sits slightly higher then the left. There is animation deformity. Breast asymmetry is present. There is grade 2 capsular contracture on the right.  IMF scars well healed. Palpable bilateral axillary lymph nodes RIGHT: SN-N: 20, N-IMF: 6.5, BW: 10.5 LEFT: SN-N: 22, N-IMF: 7, BW: 10.5  [de-identified] : no edema [de-identified] : normal affect

## 2024-04-01 ENCOUNTER — APPOINTMENT (OUTPATIENT)
Dept: CARDIOLOGY | Facility: CLINIC | Age: 59
End: 2024-04-01

## 2024-04-04 ENCOUNTER — APPOINTMENT (OUTPATIENT)
Dept: SURGERY | Facility: CLINIC | Age: 59
End: 2024-04-04

## 2024-04-24 ENCOUNTER — APPOINTMENT (OUTPATIENT)
Dept: CARDIOLOGY | Facility: CLINIC | Age: 59
End: 2024-04-24

## 2024-05-10 ENCOUNTER — APPOINTMENT (OUTPATIENT)
Dept: PLASTIC SURGERY | Facility: CLINIC | Age: 59
End: 2024-05-10

## 2024-05-10 ENCOUNTER — APPOINTMENT (OUTPATIENT)
Dept: PLASTIC SURGERY | Facility: CLINIC | Age: 59
End: 2024-05-10
Payer: COMMERCIAL

## 2024-05-10 DIAGNOSIS — T85.44XS CAPSULAR CONTRACTURE OF BREAST IMPLANT, SEQUELA: ICD-10-CM

## 2024-05-10 DIAGNOSIS — N65.0 DEFORMITY OF RECONSTRUCTED BREAST: ICD-10-CM

## 2024-05-10 DIAGNOSIS — Q83.9 CONGENITAL MALFORMATION OF BREAST, UNSPECIFIED: ICD-10-CM

## 2024-05-10 PROCEDURE — 99213 OFFICE O/P EST LOW 20 MIN: CPT

## 2024-05-12 PROBLEM — Q83.9 CONGENITAL BREAST DEFORMITY: Status: ACTIVE | Noted: 2023-08-02

## 2024-05-12 PROBLEM — T85.44XS CAPSULAR CONTRACTURE OF BREAST IMPLANT, SEQUELA: Status: ACTIVE | Noted: 2023-08-01

## 2024-05-12 PROBLEM — N65.0 BREAST RECONSTRUCTION DEFORMITY: Status: ACTIVE | Noted: 2023-08-01

## 2024-05-21 NOTE — HISTORY OF PRESENT ILLNESS
[FreeTextEntry1] : BENJA OWENS is a 57 year female  with history of pectus excavatum for which she had a reconstructive surgery for placement of custom sternal implant and silicone breast implants placed in 1986. She had subsequent revisions thereafter due to implant rupture. She as well has silicone in her axillary lymph nodes. Patient has since had her breast implants exchanged to saline. Patient presents today c/o breast asymmetry and pain to the right breast. Patient not sure of fill quantity but does know that they are 468 textured saline implants and have been recalled. She as well feels that her breast implants have become smaller then they originally were. She is interested in revision of her prior reconstruction with implant exchange.   INTERVAL HISTORY: Patient presents today for presurgical discussion.  She is scheduled for removal of bilateral saline breast implants, upsizing and replacement of saline implants with bilateral capsulectomy on 6/21/24

## 2024-05-21 NOTE — PHYSICAL EXAM
[NI] : Normal [de-identified] : NAD, AxOx3 [de-identified] : nonlabored breathing  [de-identified] : Bilateral breasts: no masses, no nipple discharge nor retraction.  Right breast sits slightly higher then the left. There is animation deformity. Breast asymmetry is present. There is grade 2 capsular contracture on the right.  IMF scars well healed. Palpable bilateral axillary lymph nodes RIGHT: SN-N: 20, N-IMF: 6.5, BW: 10.5 LEFT: SN-N: 22, N-IMF: 7, BW: 10.5  [de-identified] : no edema [de-identified] : normal affect

## 2024-05-21 NOTE — ADDENDUM
[FreeTextEntry1] :  I, Stephanie Smallwood, documented this note as a scribe on behalf of Dr. Lauren Shikowitz-Behr, MD on 05/10/2024.

## 2024-06-05 ENCOUNTER — APPOINTMENT (OUTPATIENT)
Dept: CARDIOLOGY | Facility: CLINIC | Age: 59
End: 2024-06-05

## 2024-06-18 ENCOUNTER — APPOINTMENT (OUTPATIENT)
Dept: CARDIOLOGY | Facility: CLINIC | Age: 59
End: 2024-06-18
Payer: COMMERCIAL

## 2024-06-18 ENCOUNTER — NON-APPOINTMENT (OUTPATIENT)
Age: 59
End: 2024-06-18

## 2024-06-18 ENCOUNTER — APPOINTMENT (OUTPATIENT)
Dept: PLASTIC SURGERY | Facility: CLINIC | Age: 59
End: 2024-06-18

## 2024-06-18 VITALS
HEIGHT: 64 IN | DIASTOLIC BLOOD PRESSURE: 66 MMHG | SYSTOLIC BLOOD PRESSURE: 113 MMHG | BODY MASS INDEX: 18.61 KG/M2 | WEIGHT: 109 LBS | OXYGEN SATURATION: 97 % | HEART RATE: 55 BPM

## 2024-06-18 DIAGNOSIS — R00.2 PALPITATIONS: ICD-10-CM

## 2024-06-18 DIAGNOSIS — I47.10 SUPRAVENTRICULAR TACHYCARDIA, UNSPECIFIED: ICD-10-CM

## 2024-06-18 PROCEDURE — 99214 OFFICE O/P EST MOD 30 MIN: CPT

## 2024-06-18 PROCEDURE — 93000 ELECTROCARDIOGRAM COMPLETE: CPT

## 2024-06-18 NOTE — DISCUSSION/SUMMARY
[FreeTextEntry1] : Larissa is doing okay today, though remains under a lot of stress.  She had an episode of palpitations, which sounded to be a more sustained arrhythmia a few weeks ago.  She has had episodes of PAT in the past, and was evaluated by electrophysiology a few years ago.  We will repeat an echocardiogram to confirm the absence of structural changes.  I would also like her to wear a monitor, to evaluate for sustained SVTs, if she is able to.  She will remain on her current regimen of verapamil and losartan.  She will stay active, and let me know if any additional limitation.  She will let me know how things go.  If no issues, I will see her again in 3 months [EKG obtained to assist in diagnosis and management of assessed problem(s)] : EKG obtained to assist in diagnosis and management of assessed problem(s)

## 2024-06-18 NOTE — HISTORY OF PRESENT ILLNESS
[FreeTextEntry1] : Larissa Stokes presented to the office today for a follow-up evaluation.  I last saw her in 8/23.  She has now 58 years old, with a history of a Takotsubo cardiomyopathy in 2005, with normal coronary arteries then.  She has had low normal LV systolic function since that time.  She also has a history of bilateral carotid dissections that completely healed, as well as a recent diagnosis of FMD of the right renal artery, and Hashimoto's thyroiditis.  She had previously been on AC with Coumadin, but is currently only on aspirin therapy.    In September 2019, she had a recurrent episode of chest pain, headache, and diaphoresis that was similar to the event in 2005. She went to the emergency room where her initial troponin was normal, although the second troponin was slightly higher.  Cardiac catheterization revealed normal coronary arteries, and a normal ejection fraction. Echocardiogram showed ejection fraction of 45-50% with hypocontractility of the anterior wall and septum.  There was suspicion at that time that perhaps she had a small MI on the basis of coronary spasm or thrombosis.  Her medications were not changed. In the hospital cholesterol was 178, triglycerides 34, HDL 88, and LDL 83 . She was placed on Plavix along with low-dose aspirin and losartan. Repeat echocardiogram performed 1/20 demonstrated a normal ejection fraction of 62% without wall motion abnormalities, and with mild MR.  MRA scan of the neck 7/17 was normal. She had a CT angiogram of the heart 3/16 showed a calcium score 0 without any coronary disease. She had a recent carotid Doppler that according to the patient demonstrates mild plaque. Repeat MRA of the neck performed 7/20 was unchanged. There was distal narrowing of the internal carotid arteries consistent with history of prior dissection.  She underwent a Holter monitor December, 2021 to evaluate symptoms of palpitations.  This revealed heart rates from the 50s up to the 130s.  There was a single 9 beat episode of SVT at a heart rate of 144 bpm.  On several occasions since then, she has had episodes of palpitations, lasting 15 or 20 minutes.  She was signed up for a Zio patch, but only wore it for a few hours.  Overall, she is doing ok. She has been under alot of stress She is exercising without issue, though had an episode of palpitations/fast heart rates a few weeks ago during exercise.  This worried her overall.

## 2024-06-26 ENCOUNTER — APPOINTMENT (OUTPATIENT)
Dept: PLASTIC SURGERY | Facility: HOSPITAL | Age: 59
End: 2024-06-26

## 2024-07-01 ENCOUNTER — APPOINTMENT (OUTPATIENT)
Dept: CARDIOLOGY | Facility: CLINIC | Age: 59
End: 2024-07-01

## 2024-07-03 DIAGNOSIS — E03.9 HYPOTHYROIDISM, UNSPECIFIED: ICD-10-CM

## 2024-07-03 RX ORDER — LEVOTHYROXINE SODIUM 0.09 MG/1
88 TABLET ORAL
Qty: 90 | Refills: 1 | Status: ACTIVE | COMMUNITY
Start: 2024-07-03 | End: 1900-01-01

## 2024-07-12 RX ORDER — CLOPIDOGREL 75 MG/1
75 TABLET, FILM COATED ORAL
Refills: 0 | Status: ACTIVE | COMMUNITY

## 2024-07-18 ENCOUNTER — APPOINTMENT (OUTPATIENT)
Dept: UROLOGY | Facility: CLINIC | Age: 59
End: 2024-07-18

## 2024-07-18 ENCOUNTER — APPOINTMENT (OUTPATIENT)
Dept: CARDIOLOGY | Facility: CLINIC | Age: 59
End: 2024-07-18

## 2024-07-18 ENCOUNTER — APPOINTMENT (OUTPATIENT)
Dept: CARDIOLOGY | Facility: CLINIC | Age: 59
End: 2024-07-18
Payer: COMMERCIAL

## 2024-07-18 VITALS
DIASTOLIC BLOOD PRESSURE: 78 MMHG | SYSTOLIC BLOOD PRESSURE: 106 MMHG | BODY MASS INDEX: 18.78 KG/M2 | HEIGHT: 64 IN | OXYGEN SATURATION: 99 % | HEART RATE: 59 BPM | WEIGHT: 110 LBS

## 2024-07-18 DIAGNOSIS — I77.3 ARTERIAL FIBROMUSCULAR DYSPLASIA: ICD-10-CM

## 2024-07-18 DIAGNOSIS — I72.8 ANEURYSM OF OTHER SPECIFIED ARTERIES: ICD-10-CM

## 2024-07-18 PROCEDURE — 99214 OFFICE O/P EST MOD 30 MIN: CPT

## 2024-07-18 PROCEDURE — G2211 COMPLEX E/M VISIT ADD ON: CPT

## 2024-07-31 ENCOUNTER — APPOINTMENT (OUTPATIENT)
Dept: CARDIOLOGY | Facility: CLINIC | Age: 59
End: 2024-07-31
Payer: COMMERCIAL

## 2024-07-31 ENCOUNTER — NON-APPOINTMENT (OUTPATIENT)
Age: 59
End: 2024-07-31

## 2024-07-31 PROCEDURE — 93306 TTE W/DOPPLER COMPLETE: CPT

## 2024-08-01 ENCOUNTER — APPOINTMENT (OUTPATIENT)
Dept: PAIN MANAGEMENT | Facility: CLINIC | Age: 59
End: 2024-08-01

## 2024-08-01 VITALS
DIASTOLIC BLOOD PRESSURE: 60 MMHG | HEART RATE: 64 BPM | HEIGHT: 64 IN | SYSTOLIC BLOOD PRESSURE: 96 MMHG | BODY MASS INDEX: 18.78 KG/M2 | WEIGHT: 110 LBS

## 2024-08-01 PROCEDURE — 99204 OFFICE O/P NEW MOD 45 MIN: CPT

## 2024-08-01 RX ORDER — VERAPAMIL HYDROCHLORIDE 240 MG/1
240 TABLET ORAL DAILY
Qty: 30 | Refills: 5 | Status: ACTIVE | COMMUNITY
Start: 2024-08-01 | End: 1900-01-01

## 2024-08-16 RX ORDER — TOPIRAMATE 25 MG/1
25 TABLET, FILM COATED ORAL
Qty: 60 | Refills: 0 | Status: ACTIVE | COMMUNITY
Start: 2024-08-16 | End: 1900-01-01

## 2024-08-19 RX ORDER — UBROGEPANT 50 MG/1
50 TABLET ORAL
Qty: 12 | Refills: 2 | Status: ACTIVE | COMMUNITY
Start: 2024-08-16 | End: 1900-01-01

## 2024-09-05 ENCOUNTER — OUTPATIENT (OUTPATIENT)
Dept: OUTPATIENT SERVICES | Facility: HOSPITAL | Age: 59
LOS: 1 days | End: 2024-09-05

## 2024-09-05 ENCOUNTER — APPOINTMENT (OUTPATIENT)
Dept: ULTRASOUND IMAGING | Facility: CLINIC | Age: 59
End: 2024-09-05

## 2024-09-05 DIAGNOSIS — R06.3 PERIODIC BREATHING: ICD-10-CM

## 2024-09-05 DIAGNOSIS — Q67.6 PECTUS EXCAVATUM: Chronic | ICD-10-CM

## 2024-09-05 DIAGNOSIS — Z98.890 OTHER SPECIFIED POSTPROCEDURAL STATES: Chronic | ICD-10-CM

## 2024-09-05 DIAGNOSIS — Z90.49 ACQUIRED ABSENCE OF OTHER SPECIFIED PARTS OF DIGESTIVE TRACT: Chronic | ICD-10-CM

## 2024-09-05 DIAGNOSIS — Z98.82 BREAST IMPLANT STATUS: Chronic | ICD-10-CM

## 2024-09-20 ENCOUNTER — OUTPATIENT (OUTPATIENT)
Dept: OUTPATIENT SERVICES | Facility: HOSPITAL | Age: 59
LOS: 1 days | End: 2024-09-20
Payer: COMMERCIAL

## 2024-09-20 ENCOUNTER — RESULT REVIEW (OUTPATIENT)
Age: 59
End: 2024-09-20

## 2024-09-20 ENCOUNTER — APPOINTMENT (OUTPATIENT)
Dept: ULTRASOUND IMAGING | Facility: CLINIC | Age: 59
End: 2024-09-20
Payer: COMMERCIAL

## 2024-09-20 DIAGNOSIS — Z98.890 OTHER SPECIFIED POSTPROCEDURAL STATES: Chronic | ICD-10-CM

## 2024-09-20 DIAGNOSIS — E06.3 AUTOIMMUNE THYROIDITIS: ICD-10-CM

## 2024-09-20 DIAGNOSIS — Q67.6 PECTUS EXCAVATUM: Chronic | ICD-10-CM

## 2024-09-20 DIAGNOSIS — Z98.82 BREAST IMPLANT STATUS: Chronic | ICD-10-CM

## 2024-09-20 PROCEDURE — 76536 US EXAM OF HEAD AND NECK: CPT

## 2024-09-20 PROCEDURE — 76536 US EXAM OF HEAD AND NECK: CPT | Mod: 26

## 2024-09-27 ENCOUNTER — APPOINTMENT (OUTPATIENT)
Dept: ULTRASOUND IMAGING | Facility: CLINIC | Age: 59
End: 2024-09-27

## 2024-10-03 ENCOUNTER — APPOINTMENT (OUTPATIENT)
Dept: GASTROENTEROLOGY | Facility: HOSPITAL | Age: 59
End: 2024-10-03

## 2024-10-03 ENCOUNTER — NON-APPOINTMENT (OUTPATIENT)
Age: 59
End: 2024-10-03

## 2024-10-03 ENCOUNTER — OUTPATIENT (OUTPATIENT)
Dept: OUTPATIENT SERVICES | Facility: HOSPITAL | Age: 59
LOS: 1 days | End: 2024-10-03
Payer: COMMERCIAL

## 2024-10-03 ENCOUNTER — TRANSCRIPTION ENCOUNTER (OUTPATIENT)
Age: 59
End: 2024-10-03

## 2024-10-03 VITALS
OXYGEN SATURATION: 100 % | DIASTOLIC BLOOD PRESSURE: 58 MMHG | HEART RATE: 60 BPM | SYSTOLIC BLOOD PRESSURE: 92 MMHG | RESPIRATION RATE: 16 BRPM

## 2024-10-03 VITALS
DIASTOLIC BLOOD PRESSURE: 63 MMHG | RESPIRATION RATE: 17 BRPM | TEMPERATURE: 96 F | WEIGHT: 115.08 LBS | HEART RATE: 75 BPM | HEIGHT: 61 IN | OXYGEN SATURATION: 96 % | SYSTOLIC BLOOD PRESSURE: 116 MMHG

## 2024-10-03 DIAGNOSIS — Z98.82 BREAST IMPLANT STATUS: Chronic | ICD-10-CM

## 2024-10-03 DIAGNOSIS — Z86.010 PERSONAL HISTORY OF COLON POLYPS: ICD-10-CM

## 2024-10-03 DIAGNOSIS — Z98.890 OTHER SPECIFIED POSTPROCEDURAL STATES: Chronic | ICD-10-CM

## 2024-10-03 DIAGNOSIS — Z90.49 ACQUIRED ABSENCE OF OTHER SPECIFIED PARTS OF DIGESTIVE TRACT: Chronic | ICD-10-CM

## 2024-10-03 DIAGNOSIS — Q67.6 PECTUS EXCAVATUM: Chronic | ICD-10-CM

## 2024-10-03 PROCEDURE — 45378 DIAGNOSTIC COLONOSCOPY: CPT | Mod: GC

## 2024-10-03 PROCEDURE — G0105: CPT

## 2024-10-03 DEVICE — NET RETRV ROT ROTH 2.5MMX230CM: Type: IMPLANTABLE DEVICE | Status: FUNCTIONAL

## 2024-10-03 RX ORDER — SODIUM CHLORIDE 0.9 % (FLUSH) 0.9 %
500 SYRINGE (ML) INJECTION
Refills: 0 | Status: COMPLETED | OUTPATIENT
Start: 2024-10-03 | End: 2024-10-03

## 2024-10-03 RX ADMIN — Medication 30 MILLILITER(S): at 07:59

## 2024-10-03 NOTE — ASU DISCHARGE PLAN (ADULT/PEDIATRIC) - NS MD DC FALL RISK RISK
For information on Fall & Injury Prevention, visit: [email or web address] OR  [email or web address] OR  https://www.cdc.gov/steadi/patient.html

## 2024-10-03 NOTE — PRE PROCEDURE NOTE - PRE PROCEDURE EVALUATION
Attending Physician:             Belinda Foreman               Procedure: upper endoscopy, colonoscopy    Indication for Procedure: abdominal pain, hx of adenomatous colon polyp  ________________________________________________________  PAST MEDICAL & SURGICAL HISTORY:  Anticardiolipin Antibody Positive      CAD (Coronary Artery Disease)      Myocardial Infarct  2005, 2018      Hashimoto's thyroiditis  ON      Carotid dissection, bilateral  2011- healed self- TIA- neck cannot be hyper extented      TIA (transient ischemic attack)  2011      Coronary artery spasm      Fibromuscular dysplasia  cannot get HR above 130      Irritable bowel syndrome (IBS)      FMD (frontometaphyseal dysplasia)  right renal artery, iliac artery      Pelvic floor dysfunction      Dissection of mesenteric artery  SMA- healed      History of appendectomy      S/P correction of deviated nasal septum      Congenital pectus excavatum  s/p surgery      S/P bilateral breast implants  age 19 's      S/P saline breast implant  bilateral age 29 's      H/O umbilical hernia repair      H/O cosmetic surgery  face lift 3/2021        ALLERGIES:  epidural medications (Rash)  epinephrine (Rash)    HOME MEDICATIONS:  clonazePAM 0.5 mg oral tablet: 1 tab(s) orally once a day (at bedtime)  DULoxetine 40 mg oral delayed release capsule: 1 cap(s) orally once a day  levothyroxine 88 mcg (0.088 mg) oral tablet: 1 tab(s) orally once a day  Linzess 145 mcg oral capsule: 1 cap(s) orally once a day  losartan 25 mg oral tablet: 1 tab(s) orally once a day  Lunesta 3 mg oral tablet: 1 tab(s) orally once a day (at bedtime)  Plavix 75 mg oral tablet: 1 tab(s) orally once a day  sertraline 50 mg oral tablet: 1 tab(s) orally once a day  verapamil 180 mg/24 hours oral capsule, extended release: 1 cap(s) orally once a day  Xanax XR 0.5 mg oral tablet, extended release: 1 tab(s) orally once a day (in the morning)    AICD/PPM: [ ] yes   [ ] no    PERTINENT LAB DATA:                      PHYSICAL EXAMINATION:    T(C): --  HR: --  BP: --  RR: --  SpO2: --    Constitutional: NAD  HEENT: PERRLA EOMI,    Neck:  No JVD  Respiratory: CTAB/L  Cardiovascular: S1 and S2  Gastrointestinal: BS+, soft, NT/ND  Extremities: No peripheral edema  Neurological: A/O x 3, no focal deficits  Psychiatric: Normal mood, normal affect  Skin: No rashes    ASA Class: I [ ]  II [ ]  III [ ]  IV [ ]    COMMENTS:    The patient is a suitable candidate for the planned procedure unless box checked [ ]  No, explain:

## 2024-10-10 ENCOUNTER — APPOINTMENT (OUTPATIENT)
Dept: SURGERY | Facility: CLINIC | Age: 59
End: 2024-10-10

## 2024-10-21 ENCOUNTER — APPOINTMENT (OUTPATIENT)
Dept: PAIN MANAGEMENT | Facility: CLINIC | Age: 59
End: 2024-10-21

## 2024-11-25 ENCOUNTER — RX RENEWAL (OUTPATIENT)
Age: 59
End: 2024-11-25

## 2024-11-26 ENCOUNTER — APPOINTMENT (OUTPATIENT)
Dept: SURGERY | Facility: CLINIC | Age: 59
End: 2024-11-26
Payer: COMMERCIAL

## 2024-11-26 DIAGNOSIS — E04.1 NONTOXIC SINGLE THYROID NODULE: ICD-10-CM

## 2024-11-26 PROCEDURE — 99213 OFFICE O/P EST LOW 20 MIN: CPT

## 2024-11-26 PROCEDURE — G2211 COMPLEX E/M VISIT ADD ON: CPT

## 2024-12-09 NOTE — ED ADULT NURSE NOTE - NS_SISCREENINGSR_GEN_ALL_ED
Pt has dementia and is a poor historian and is unable to be interviewed. Pt's  and sons provided collateral. As per family, pt has been getting more confused and hostile at home with worsening dementia. She has a private aide Mon-Fri 9-5. Negative

## 2024-12-23 ENCOUNTER — RX RENEWAL (OUTPATIENT)
Age: 59
End: 2024-12-23

## 2024-12-28 ENCOUNTER — RX RENEWAL (OUTPATIENT)
Age: 59
End: 2024-12-28

## 2025-01-06 ENCOUNTER — NON-APPOINTMENT (OUTPATIENT)
Age: 60
End: 2025-01-06

## 2025-01-31 ENCOUNTER — RX RENEWAL (OUTPATIENT)
Age: 60
End: 2025-01-31

## 2025-02-07 ENCOUNTER — TRANSCRIPTION ENCOUNTER (OUTPATIENT)
Age: 60
End: 2025-02-07

## 2025-02-10 ENCOUNTER — TRANSCRIPTION ENCOUNTER (OUTPATIENT)
Age: 60
End: 2025-02-10

## 2025-02-18 ENCOUNTER — RX RENEWAL (OUTPATIENT)
Age: 60
End: 2025-02-18

## 2025-03-07 ENCOUNTER — APPOINTMENT (OUTPATIENT)
Dept: PAIN MANAGEMENT | Facility: CLINIC | Age: 60
End: 2025-03-07
Payer: COMMERCIAL

## 2025-03-07 PROCEDURE — 99213 OFFICE O/P EST LOW 20 MIN: CPT | Mod: 93

## 2025-03-12 ENCOUNTER — NON-APPOINTMENT (OUTPATIENT)
Age: 60
End: 2025-03-12

## 2025-03-13 DIAGNOSIS — G43.709 CHRONIC MIGRAINE W/OUT AURA, NOT INTRACTABLE, W/OUT STATUS MIGRAINOSUS: ICD-10-CM

## 2025-03-18 RX ORDER — ONABOTULINUMTOXINA 200 [USP'U]/1
200 INJECTION, POWDER, LYOPHILIZED, FOR SOLUTION INTRADERMAL; INTRAMUSCULAR
Qty: 1 | Refills: 3 | Status: ACTIVE | COMMUNITY
Start: 2025-03-13 | End: 1900-01-01

## 2025-03-28 ENCOUNTER — APPOINTMENT (OUTPATIENT)
Dept: PLASTIC SURGERY | Facility: CLINIC | Age: 60
End: 2025-03-28

## 2025-04-04 ENCOUNTER — APPOINTMENT (OUTPATIENT)
Dept: PSYCHIATRY | Facility: CLINIC | Age: 60
End: 2025-04-04

## 2025-04-04 DIAGNOSIS — F10.21 ALCOHOL DEPENDENCE, IN REMISSION: ICD-10-CM

## 2025-04-04 DIAGNOSIS — F51.02 ADJUSTMENT INSOMNIA: ICD-10-CM

## 2025-04-04 PROCEDURE — 99205 OFFICE O/P NEW HI 60 MIN: CPT

## 2025-04-04 RX ORDER — SERTRALINE HYDROCHLORIDE 100 MG/1
100 TABLET, FILM COATED ORAL
Qty: 180 | Refills: 0 | Status: ACTIVE | COMMUNITY
Start: 2025-04-04 | End: 1900-01-01

## 2025-04-04 RX ORDER — NALTREXONE HYDROCHLORIDE 50 MG/1
50 TABLET, FILM COATED ORAL
Qty: 90 | Refills: 0 | Status: ACTIVE | COMMUNITY
Start: 2025-04-04 | End: 1900-01-01

## 2025-04-07 ENCOUNTER — APPOINTMENT (OUTPATIENT)
Dept: PAIN MANAGEMENT | Facility: CLINIC | Age: 60
End: 2025-04-07

## 2025-04-07 RX ORDER — GABAPENTIN 300 MG/1
300 CAPSULE ORAL TWICE DAILY
Qty: 60 | Refills: 0 | Status: ACTIVE | COMMUNITY
Start: 2025-04-07 | End: 1900-01-01

## 2025-04-16 DIAGNOSIS — I77.3 ARTERIAL FIBROMUSCULAR DYSPLASIA: ICD-10-CM

## 2025-04-16 DIAGNOSIS — I77.9 DISORDER OF ARTERIES AND ARTERIOLES, UNSPECIFIED: ICD-10-CM

## 2025-04-18 ENCOUNTER — APPOINTMENT (OUTPATIENT)
Dept: PSYCHIATRY | Facility: CLINIC | Age: 60
End: 2025-04-18

## 2025-04-21 ENCOUNTER — APPOINTMENT (OUTPATIENT)
Age: 60
End: 2025-04-21

## 2025-04-21 DIAGNOSIS — M25.512 PAIN IN RIGHT SHOULDER: ICD-10-CM

## 2025-04-21 DIAGNOSIS — M54.9 DORSALGIA, UNSPECIFIED: ICD-10-CM

## 2025-04-21 DIAGNOSIS — M25.519 PAIN IN UNSPECIFIED SHOULDER: ICD-10-CM

## 2025-04-21 DIAGNOSIS — G89.29 PAIN IN RIGHT SHOULDER: ICD-10-CM

## 2025-04-21 DIAGNOSIS — M25.511 PAIN IN RIGHT SHOULDER: ICD-10-CM

## 2025-04-21 PROCEDURE — 97813 ACUP 1/> W/ESTIM 1ST 15 MIN: CPT

## 2025-04-21 PROCEDURE — 97814 ACUP 1/> W/ESTIM EA ADDL 15: CPT

## 2025-04-28 ENCOUNTER — APPOINTMENT (OUTPATIENT)
Age: 60
End: 2025-04-28

## 2025-05-02 ENCOUNTER — NON-APPOINTMENT (OUTPATIENT)
Age: 60
End: 2025-05-02

## 2025-05-02 ENCOUNTER — APPOINTMENT (OUTPATIENT)
Dept: PLASTIC SURGERY | Facility: CLINIC | Age: 60
End: 2025-05-02

## 2025-05-07 ENCOUNTER — APPOINTMENT (OUTPATIENT)
Dept: PLASTIC SURGERY | Facility: HOSPITAL | Age: 60
End: 2025-05-07

## 2025-05-12 ENCOUNTER — APPOINTMENT (OUTPATIENT)
Dept: PAIN MANAGEMENT | Facility: CLINIC | Age: 60
End: 2025-05-12

## 2025-05-12 ENCOUNTER — APPOINTMENT (OUTPATIENT)
Age: 60
End: 2025-05-12

## 2025-05-16 ENCOUNTER — APPOINTMENT (OUTPATIENT)
Dept: UROLOGY | Facility: CLINIC | Age: 60
End: 2025-05-16
Payer: COMMERCIAL

## 2025-05-16 VITALS
HEART RATE: 60 BPM | DIASTOLIC BLOOD PRESSURE: 64 MMHG | BODY MASS INDEX: 18.78 KG/M2 | RESPIRATION RATE: 16 BRPM | OXYGEN SATURATION: 97 % | TEMPERATURE: 98 F | HEIGHT: 64 IN | WEIGHT: 110 LBS | SYSTOLIC BLOOD PRESSURE: 97 MMHG

## 2025-05-16 DIAGNOSIS — M79.18 MYALGIA, OTHER SITE: ICD-10-CM

## 2025-05-16 DIAGNOSIS — N94.10 UNSPECIFIED DYSPAREUNIA: ICD-10-CM

## 2025-05-16 PROCEDURE — 99204 OFFICE O/P NEW MOD 45 MIN: CPT

## 2025-05-16 PROCEDURE — G2211 COMPLEX E/M VISIT ADD ON: CPT

## 2025-05-19 ENCOUNTER — APPOINTMENT (OUTPATIENT)
Age: 60
End: 2025-05-19
Payer: COMMERCIAL

## 2025-05-19 ENCOUNTER — NON-APPOINTMENT (OUTPATIENT)
Age: 60
End: 2025-05-19

## 2025-05-19 DIAGNOSIS — M54.9 DORSALGIA, UNSPECIFIED: ICD-10-CM

## 2025-05-19 LAB
APPEARANCE: CLEAR
BACTERIA: NEGATIVE /HPF
BILIRUBIN URINE: NEGATIVE
BLOOD URINE: NEGATIVE
CAST: 0 /LPF
COLOR: YELLOW
EPITHELIAL CELLS: 2 /HPF
GLUCOSE QUALITATIVE U: NEGATIVE MG/DL
KETONES URINE: NEGATIVE MG/DL
LEUKOCYTE ESTERASE URINE: ABNORMAL
MICROSCOPIC-UA: NORMAL
NITRITE URINE: NEGATIVE
PH URINE: 6.5
PROTEIN URINE: NEGATIVE MG/DL
RED BLOOD CELLS URINE: 0 /HPF
REVIEW: NORMAL
SPECIFIC GRAVITY URINE: 1.01
UROBILINOGEN URINE: 0.2 MG/DL
WHITE BLOOD CELLS URINE: 1 /HPF

## 2025-05-19 PROCEDURE — 97810 ACUP 1/> WO ESTIM 1ST 15 MIN: CPT

## 2025-05-19 PROCEDURE — 97811 ACUP 1/> W/O ESTIM EA ADD 15: CPT

## 2025-05-21 ENCOUNTER — APPOINTMENT (OUTPATIENT)
Dept: OBGYN | Facility: CLINIC | Age: 60
End: 2025-05-21

## 2025-06-02 ENCOUNTER — APPOINTMENT (OUTPATIENT)
Age: 60
End: 2025-06-02
Payer: COMMERCIAL

## 2025-06-02 ENCOUNTER — APPOINTMENT (OUTPATIENT)
Dept: MRI IMAGING | Facility: CLINIC | Age: 60
End: 2025-06-02

## 2025-06-02 DIAGNOSIS — M54.9 DORSALGIA, UNSPECIFIED: ICD-10-CM

## 2025-06-02 PROCEDURE — 97813 ACUP 1/> W/ESTIM 1ST 15 MIN: CPT

## 2025-06-02 PROCEDURE — 97814 ACUP 1/> W/ESTIM EA ADDL 15: CPT

## 2025-06-04 ENCOUNTER — APPOINTMENT (OUTPATIENT)
Dept: OBGYN | Facility: CLINIC | Age: 60
End: 2025-06-04
Payer: COMMERCIAL

## 2025-06-04 VITALS
SYSTOLIC BLOOD PRESSURE: 109 MMHG | DIASTOLIC BLOOD PRESSURE: 73 MMHG | BODY MASS INDEX: 19.63 KG/M2 | HEIGHT: 64 IN | WEIGHT: 115 LBS

## 2025-06-04 DIAGNOSIS — N84.1 POLYP OF CERVIX UTERI: ICD-10-CM

## 2025-06-04 DIAGNOSIS — Z13.820 ENCOUNTER FOR SCREENING FOR OSTEOPOROSIS: ICD-10-CM

## 2025-06-04 DIAGNOSIS — Z00.00 ENCOUNTER FOR GENERAL ADULT MEDICAL EXAMINATION W/OUT ABNORMAL FINDINGS: ICD-10-CM

## 2025-06-04 DIAGNOSIS — Z11.51 ENCOUNTER FOR SCREENING FOR HUMAN PAPILLOMAVIRUS (HPV): ICD-10-CM

## 2025-06-04 DIAGNOSIS — Z01.411 ENCOUNTER FOR GYNECOLOGICAL EXAMINATION (GENERAL) (ROUTINE) WITH ABNORMAL FINDINGS: ICD-10-CM

## 2025-06-04 DIAGNOSIS — N83.202 UNSPECIFIED OVARIAN CYST, LEFT SIDE: ICD-10-CM

## 2025-06-04 PROCEDURE — 99459 PELVIC EXAMINATION: CPT

## 2025-06-04 PROCEDURE — 99386 PREV VISIT NEW AGE 40-64: CPT

## 2025-06-04 PROCEDURE — 99213 OFFICE O/P EST LOW 20 MIN: CPT | Mod: 25

## 2025-06-06 LAB — HPV HIGH+LOW RISK DNA PNL CVX: NOT DETECTED

## 2025-06-10 ENCOUNTER — NON-APPOINTMENT (OUTPATIENT)
Age: 60
End: 2025-06-10

## 2025-06-10 LAB — CYTOLOGY CVX/VAG DOC THIN PREP: ABNORMAL

## 2025-06-12 ENCOUNTER — RESULT REVIEW (OUTPATIENT)
Age: 60
End: 2025-06-12

## 2025-06-12 ENCOUNTER — APPOINTMENT (OUTPATIENT)
Dept: ULTRASOUND IMAGING | Facility: CLINIC | Age: 60
End: 2025-06-12

## 2025-06-12 ENCOUNTER — OUTPATIENT (OUTPATIENT)
Dept: OUTPATIENT SERVICES | Facility: HOSPITAL | Age: 60
LOS: 1 days | End: 2025-06-12
Payer: COMMERCIAL

## 2025-06-12 DIAGNOSIS — I77.3 ARTERIAL FIBROMUSCULAR DYSPLASIA: ICD-10-CM

## 2025-06-12 DIAGNOSIS — Z98.890 OTHER SPECIFIED POSTPROCEDURAL STATES: Chronic | ICD-10-CM

## 2025-06-12 DIAGNOSIS — Z90.49 ACQUIRED ABSENCE OF OTHER SPECIFIED PARTS OF DIGESTIVE TRACT: Chronic | ICD-10-CM

## 2025-06-12 DIAGNOSIS — Q67.6 PECTUS EXCAVATUM: Chronic | ICD-10-CM

## 2025-06-12 DIAGNOSIS — Z98.82 BREAST IMPLANT STATUS: Chronic | ICD-10-CM

## 2025-06-12 DIAGNOSIS — M79.18 MYALGIA, OTHER SITE: ICD-10-CM

## 2025-06-12 DIAGNOSIS — Z00.8 ENCOUNTER FOR OTHER GENERAL EXAMINATION: ICD-10-CM

## 2025-06-12 PROCEDURE — 76857 US EXAM PELVIC LIMITED: CPT

## 2025-06-12 PROCEDURE — 93975 VASCULAR STUDY: CPT

## 2025-06-12 PROCEDURE — 76857 US EXAM PELVIC LIMITED: CPT | Mod: 26,59

## 2025-06-12 PROCEDURE — 93975 VASCULAR STUDY: CPT | Mod: 26

## 2025-06-13 ENCOUNTER — OUTPATIENT (OUTPATIENT)
Dept: OUTPATIENT SERVICES | Facility: HOSPITAL | Age: 60
LOS: 1 days | End: 2025-06-13
Payer: COMMERCIAL

## 2025-06-13 ENCOUNTER — APPOINTMENT (OUTPATIENT)
Dept: MRI IMAGING | Facility: CLINIC | Age: 60
End: 2025-06-13

## 2025-06-13 ENCOUNTER — APPOINTMENT (OUTPATIENT)
Dept: PSYCHIATRY | Facility: CLINIC | Age: 60
End: 2025-06-13

## 2025-06-13 DIAGNOSIS — Z98.890 OTHER SPECIFIED POSTPROCEDURAL STATES: Chronic | ICD-10-CM

## 2025-06-13 DIAGNOSIS — I77.9 DISORDER OF ARTERIES AND ARTERIOLES, UNSPECIFIED: ICD-10-CM

## 2025-06-13 DIAGNOSIS — Z90.49 ACQUIRED ABSENCE OF OTHER SPECIFIED PARTS OF DIGESTIVE TRACT: Chronic | ICD-10-CM

## 2025-06-13 DIAGNOSIS — Z98.82 BREAST IMPLANT STATUS: Chronic | ICD-10-CM

## 2025-06-13 DIAGNOSIS — I77.3 ARTERIAL FIBROMUSCULAR DYSPLASIA: ICD-10-CM

## 2025-06-13 DIAGNOSIS — Q67.6 PECTUS EXCAVATUM: Chronic | ICD-10-CM

## 2025-06-13 PROCEDURE — 70549 MR ANGIOGRAPH NECK W/O&W/DYE: CPT

## 2025-06-13 PROCEDURE — 70544 MR ANGIOGRAPHY HEAD W/O DYE: CPT

## 2025-06-13 PROCEDURE — 70549 MR ANGIOGRAPH NECK W/O&W/DYE: CPT | Mod: 26

## 2025-06-13 PROCEDURE — A9585: CPT

## 2025-06-13 PROCEDURE — 70544 MR ANGIOGRAPHY HEAD W/O DYE: CPT | Mod: 26

## 2025-06-16 ENCOUNTER — APPOINTMENT (OUTPATIENT)
Age: 60
End: 2025-06-16
Payer: COMMERCIAL

## 2025-06-16 PROCEDURE — 97813 ACUP 1/> W/ESTIM 1ST 15 MIN: CPT

## 2025-06-16 PROCEDURE — 97814 ACUP 1/> W/ESTIM EA ADDL 15: CPT

## 2025-06-23 ENCOUNTER — APPOINTMENT (OUTPATIENT)
Age: 60
End: 2025-06-23
Payer: COMMERCIAL

## 2025-06-23 PROCEDURE — 97814 ACUP 1/> W/ESTIM EA ADDL 15: CPT

## 2025-06-23 PROCEDURE — 97813 ACUP 1/> W/ESTIM 1ST 15 MIN: CPT

## 2025-06-30 ENCOUNTER — APPOINTMENT (OUTPATIENT)
Age: 60
End: 2025-06-30
Payer: COMMERCIAL

## 2025-06-30 PROCEDURE — 97813 ACUP 1/> W/ESTIM 1ST 15 MIN: CPT

## 2025-06-30 PROCEDURE — 97814 ACUP 1/> W/ESTIM EA ADDL 15: CPT

## 2025-07-14 ENCOUNTER — APPOINTMENT (OUTPATIENT)
Dept: ULTRASOUND IMAGING | Facility: CLINIC | Age: 60
End: 2025-07-14

## 2025-07-14 ENCOUNTER — OUTPATIENT (OUTPATIENT)
Dept: OUTPATIENT SERVICES | Facility: HOSPITAL | Age: 60
LOS: 1 days | End: 2025-07-14
Payer: COMMERCIAL

## 2025-07-14 ENCOUNTER — APPOINTMENT (OUTPATIENT)
Age: 60
End: 2025-07-14
Payer: COMMERCIAL

## 2025-07-14 DIAGNOSIS — Z98.890 OTHER SPECIFIED POSTPROCEDURAL STATES: Chronic | ICD-10-CM

## 2025-07-14 DIAGNOSIS — Z90.49 ACQUIRED ABSENCE OF OTHER SPECIFIED PARTS OF DIGESTIVE TRACT: Chronic | ICD-10-CM

## 2025-07-14 DIAGNOSIS — Q67.6 PECTUS EXCAVATUM: Chronic | ICD-10-CM

## 2025-07-14 DIAGNOSIS — Z98.82 BREAST IMPLANT STATUS: Chronic | ICD-10-CM

## 2025-07-14 DIAGNOSIS — Z00.8 ENCOUNTER FOR OTHER GENERAL EXAMINATION: ICD-10-CM

## 2025-07-14 DIAGNOSIS — N83.202 UNSPECIFIED OVARIAN CYST, LEFT SIDE: ICD-10-CM

## 2025-07-14 DIAGNOSIS — Z00.00 ENCOUNTER FOR GENERAL ADULT MEDICAL EXAMINATION WITHOUT ABNORMAL FINDINGS: ICD-10-CM

## 2025-07-14 PROCEDURE — 97813 ACUP 1/> W/ESTIM 1ST 15 MIN: CPT

## 2025-07-14 PROCEDURE — 76830 TRANSVAGINAL US NON-OB: CPT

## 2025-07-14 PROCEDURE — 76830 TRANSVAGINAL US NON-OB: CPT | Mod: 26

## 2025-07-14 PROCEDURE — 76856 US EXAM PELVIC COMPLETE: CPT | Mod: 26

## 2025-07-14 PROCEDURE — 97814 ACUP 1/> W/ESTIM EA ADDL 15: CPT

## 2025-07-14 PROCEDURE — 76856 US EXAM PELVIC COMPLETE: CPT

## 2025-07-17 ENCOUNTER — APPOINTMENT (OUTPATIENT)
Dept: GASTROENTEROLOGY | Facility: CLINIC | Age: 60
End: 2025-07-17

## 2025-07-17 ENCOUNTER — NON-APPOINTMENT (OUTPATIENT)
Age: 60
End: 2025-07-17

## 2025-07-17 VITALS
WEIGHT: 108 LBS | DIASTOLIC BLOOD PRESSURE: 59 MMHG | HEIGHT: 64 IN | OXYGEN SATURATION: 100 % | BODY MASS INDEX: 18.44 KG/M2 | HEART RATE: 51 BPM | SYSTOLIC BLOOD PRESSURE: 91 MMHG

## 2025-07-17 PROBLEM — R10.13 EPIGASTRIC PAIN: Status: ACTIVE | Noted: 2025-07-17

## 2025-07-17 PROCEDURE — 99214 OFFICE O/P EST MOD 30 MIN: CPT

## 2025-07-17 PROCEDURE — G2211 COMPLEX E/M VISIT ADD ON: CPT

## 2025-07-17 RX ORDER — PANTOPRAZOLE 40 MG/1
40 TABLET, DELAYED RELEASE ORAL DAILY
Qty: 30 | Refills: 5 | Status: ACTIVE | COMMUNITY
Start: 2025-07-17 | End: 1900-01-01

## 2025-07-17 RX ORDER — LINACLOTIDE 72 UG/1
72 CAPSULE, GELATIN COATED ORAL
Qty: 30 | Refills: 5 | Status: ACTIVE | COMMUNITY
Start: 2025-07-17 | End: 1900-01-01

## 2025-07-21 ENCOUNTER — APPOINTMENT (OUTPATIENT)
Dept: PAIN MANAGEMENT | Facility: CLINIC | Age: 60
End: 2025-07-21
Payer: COMMERCIAL

## 2025-07-21 PROCEDURE — 64615 CHEMODENERV MUSC MIGRAINE: CPT

## 2025-07-21 PROCEDURE — 99213 OFFICE O/P EST LOW 20 MIN: CPT | Mod: 25

## 2025-07-22 ENCOUNTER — APPOINTMENT (OUTPATIENT)
Dept: GASTROENTEROLOGY | Facility: CLINIC | Age: 60
End: 2025-07-22

## 2025-07-23 DIAGNOSIS — H91.90 UNSPECIFIED HEARING LOSS, UNSPECIFIED EAR: ICD-10-CM

## 2025-07-28 ENCOUNTER — APPOINTMENT (OUTPATIENT)
Age: 60
End: 2025-07-28
Payer: COMMERCIAL

## 2025-07-28 ENCOUNTER — APPOINTMENT (OUTPATIENT)
Dept: RADIOLOGY | Facility: CLINIC | Age: 60
End: 2025-07-28
Payer: COMMERCIAL

## 2025-07-28 ENCOUNTER — OUTPATIENT (OUTPATIENT)
Dept: OUTPATIENT SERVICES | Facility: HOSPITAL | Age: 60
LOS: 1 days | End: 2025-07-28
Payer: COMMERCIAL

## 2025-07-28 DIAGNOSIS — Z13.820 ENCOUNTER FOR SCREENING FOR OSTEOPOROSIS: ICD-10-CM

## 2025-07-28 PROCEDURE — 97813 ACUP 1/> W/ESTIM 1ST 15 MIN: CPT

## 2025-07-28 PROCEDURE — 77080 DXA BONE DENSITY AXIAL: CPT

## 2025-07-28 PROCEDURE — 77080 DXA BONE DENSITY AXIAL: CPT | Mod: 26

## 2025-07-28 PROCEDURE — 97814 ACUP 1/> W/ESTIM EA ADDL 15: CPT

## 2025-07-29 ENCOUNTER — NON-APPOINTMENT (OUTPATIENT)
Age: 60
End: 2025-07-29

## 2025-07-30 ENCOUNTER — APPOINTMENT (OUTPATIENT)
Dept: ENDOCRINOLOGY | Facility: CLINIC | Age: 60
End: 2025-07-30
Payer: COMMERCIAL

## 2025-07-30 VITALS
DIASTOLIC BLOOD PRESSURE: 70 MMHG | OXYGEN SATURATION: 96 % | HEIGHT: 64 IN | SYSTOLIC BLOOD PRESSURE: 105 MMHG | HEART RATE: 63 BPM | WEIGHT: 108 LBS | BODY MASS INDEX: 18.44 KG/M2 | TEMPERATURE: 97.3 F

## 2025-07-30 PROCEDURE — 99205 OFFICE O/P NEW HI 60 MIN: CPT

## 2025-07-31 LAB
25(OH)D3 SERPL-MCNC: 44.9 NG/ML
ALBUMIN SERPL ELPH-MCNC: 4.7 G/DL
ALP BLD-CCNC: 69 U/L
ALT SERPL-CCNC: 12 U/L
ANION GAP SERPL CALC-SCNC: 15 MMOL/L
AST SERPL-CCNC: 25 U/L
BILIRUB SERPL-MCNC: 0.2 MG/DL
BUN SERPL-MCNC: 12 MG/DL
CALCIUM SERPL-MCNC: 9.8 MG/DL
CHLORIDE SERPL-SCNC: 104 MMOL/L
CO2 SERPL-SCNC: 22 MMOL/L
CREAT SERPL-MCNC: 0.9 MG/DL
EGFRCR SERPLBLD CKD-EPI 2021: 73 ML/MIN/1.73M2
GLUCOSE SERPL-MCNC: 82 MG/DL
POTASSIUM SERPL-SCNC: 4.4 MMOL/L
PROT SERPL-MCNC: 7 G/DL
SODIUM SERPL-SCNC: 141 MMOL/L
TSH SERPL-ACNC: 5.53 UIU/ML

## 2025-08-01 ENCOUNTER — APPOINTMENT (OUTPATIENT)
Dept: ENDOCRINOLOGY | Facility: CLINIC | Age: 60
End: 2025-08-01
Payer: COMMERCIAL

## 2025-08-01 DIAGNOSIS — E04.1 NONTOXIC SINGLE THYROID NODULE: ICD-10-CM

## 2025-08-01 DIAGNOSIS — E03.9 HYPOTHYROIDISM, UNSPECIFIED: ICD-10-CM

## 2025-08-01 DIAGNOSIS — E06.3 AUTOIMMUNE THYROIDITIS: ICD-10-CM

## 2025-08-01 DIAGNOSIS — M81.0 AGE-RELATED OSTEOPOROSIS W/OUT CURRENT PATHOLOGICAL FRACTURE: ICD-10-CM

## 2025-08-01 PROCEDURE — 99214 OFFICE O/P EST MOD 30 MIN: CPT | Mod: 95

## 2025-08-04 ENCOUNTER — APPOINTMENT (OUTPATIENT)
Age: 60
End: 2025-08-04
Payer: COMMERCIAL

## 2025-08-04 ENCOUNTER — LABORATORY RESULT (OUTPATIENT)
Age: 60
End: 2025-08-04

## 2025-08-04 DIAGNOSIS — M54.9 DORSALGIA, UNSPECIFIED: ICD-10-CM

## 2025-08-04 PROCEDURE — 97814 ACUP 1/> W/ESTIM EA ADDL 15: CPT

## 2025-08-04 PROCEDURE — 97813 ACUP 1/> W/ESTIM 1ST 15 MIN: CPT

## 2025-08-05 ENCOUNTER — RESULT REVIEW (OUTPATIENT)
Age: 60
End: 2025-08-05

## 2025-08-05 ENCOUNTER — APPOINTMENT (OUTPATIENT)
Dept: HEMATOLOGY ONCOLOGY | Facility: CLINIC | Age: 60
End: 2025-08-05
Payer: COMMERCIAL

## 2025-08-05 VITALS
TEMPERATURE: 97.2 F | HEIGHT: 64 IN | WEIGHT: 109.38 LBS | OXYGEN SATURATION: 97 % | DIASTOLIC BLOOD PRESSURE: 77 MMHG | SYSTOLIC BLOOD PRESSURE: 108 MMHG | HEART RATE: 60 BPM | BODY MASS INDEX: 18.67 KG/M2

## 2025-08-05 DIAGNOSIS — R76.0 RAISED ANTIBODY TITER: ICD-10-CM

## 2025-08-05 DIAGNOSIS — R77.8 OTHER SPECIFIED ABNORMALITIES OF PLASMA PROTEINS: ICD-10-CM

## 2025-08-05 PROCEDURE — 99244 OFF/OP CNSLTJ NEW/EST MOD 40: CPT

## 2025-08-06 ENCOUNTER — NON-APPOINTMENT (OUTPATIENT)
Age: 60
End: 2025-08-06

## 2025-08-06 PROBLEM — R77.8 ABNORMAL SPEP: Status: ACTIVE | Noted: 2025-07-30

## 2025-08-06 LAB
ALBUMIN MFR SERPL ELPH: 69.1 %
ALBUMIN SERPL-MCNC: 4.7 G/DL
ALBUMIN/GLOB SERPL: 2.2 RATIO
ALPHA1 GLOB MFR SERPL ELPH: 3.5 %
ALPHA1 GLOB SERPL ELPH-MCNC: 0.2 G/DL
ALPHA2 GLOB MFR SERPL ELPH: 9.3 %
ALPHA2 GLOB SERPL ELPH-MCNC: 0.6 G/DL
B-GLOBULIN MFR SERPL ELPH: 9.2 %
B-GLOBULIN SERPL ELPH-MCNC: 0.6 G/DL
ERYTHROCYTE [SEDIMENTATION RATE] IN BLOOD BY WESTERGREN METHOD: 2 MM/HR
GAMMA GLOB FLD ELPH-MCNC: 0.6 G/DL
GAMMA GLOB MFR SERPL ELPH: 8.9 %
INTERPRETATION SERPL IEP-IMP: NORMAL
PROT SERPL-MCNC: 6.8 G/DL
PROT SERPL-MCNC: 6.8 G/DL

## 2025-08-08 RX ORDER — RIMEGEPANT SULFATE 75 MG/75MG
75 TABLET, ORALLY DISINTEGRATING ORAL
Qty: 8 | Refills: 0 | Status: ACTIVE | COMMUNITY
Start: 2025-07-21

## 2025-08-11 ENCOUNTER — NON-APPOINTMENT (OUTPATIENT)
Age: 60
End: 2025-08-11

## 2025-08-12 ENCOUNTER — APPOINTMENT (OUTPATIENT)
Dept: PAIN MANAGEMENT | Facility: CLINIC | Age: 60
End: 2025-08-12

## 2025-08-12 ENCOUNTER — NON-APPOINTMENT (OUTPATIENT)
Age: 60
End: 2025-08-12

## 2025-08-12 RX ORDER — FAMOTIDINE 40 MG/1
40 TABLET, FILM COATED ORAL
Qty: 30 | Refills: 1 | Status: ACTIVE | COMMUNITY
Start: 2025-08-12 | End: 1900-01-01

## 2025-08-13 LAB
ALBUMIN MFR SERPL ELPH: 68.7 %
ALBUMIN SERPL-MCNC: 4.5 G/DL
ALBUMIN/GLOB SERPL: 2.2 RATIO
ALPHA1 GLOB MFR SERPL ELPH: 3.7 %
ALPHA1 GLOB SERPL ELPH-MCNC: 0.2 G/DL
ALPHA2 GLOB MFR SERPL ELPH: 9.4 %
ALPHA2 GLOB SERPL ELPH-MCNC: 0.6 G/DL
B-GLOBULIN MFR SERPL ELPH: 9.3 %
B-GLOBULIN SERPL ELPH-MCNC: 0.6 G/DL
DEPRECATED KAPPA LC FREE/LAMBDA SER: 0.92 RATIO
GAMMA GLOB FLD ELPH-MCNC: 0.6 G/DL
GAMMA GLOB MFR SERPL ELPH: 8.9 %
IGA SERPL-MCNC: 98 MG/DL
IGG SERPL-MCNC: 586 MG/DL
IGM SERPL-MCNC: 115 MG/DL
INTERPRETATION SERPL IEP-IMP: NORMAL
KAPPA LC CSF-MCNC: 1.59 MG/DL
KAPPA LC SERPL-MCNC: 1.47 MG/DL
M PROTEIN SPEC IFE-MCNC: NORMAL
PROT SERPL-MCNC: 6.5 G/DL
PROT SERPL-MCNC: 6.5 G/DL

## 2025-08-18 ENCOUNTER — APPOINTMENT (OUTPATIENT)
Age: 60
End: 2025-08-18
Payer: COMMERCIAL

## 2025-08-18 PROCEDURE — 97813 ACUP 1/> W/ESTIM 1ST 15 MIN: CPT

## 2025-08-18 PROCEDURE — 97814 ACUP 1/> W/ESTIM EA ADDL 15: CPT

## 2025-08-25 ENCOUNTER — APPOINTMENT (OUTPATIENT)
Age: 60
End: 2025-08-25
Payer: COMMERCIAL

## 2025-08-25 DIAGNOSIS — M54.9 DORSALGIA, UNSPECIFIED: ICD-10-CM

## 2025-08-25 PROCEDURE — 97813 ACUP 1/> W/ESTIM 1ST 15 MIN: CPT

## 2025-08-25 PROCEDURE — 97814 ACUP 1/> W/ESTIM EA ADDL 15: CPT

## 2025-08-28 RX ORDER — METHYLPREDNISOLONE 32 MG/1
32 TABLET ORAL
Qty: 2 | Refills: 0 | Status: ACTIVE | COMMUNITY
Start: 2025-08-28 | End: 1900-01-01

## 2025-08-28 RX ORDER — DIPHENHYDRAMINE HCL 50 MG/1
50 CAPSULE ORAL
Qty: 1 | Refills: 0 | Status: ACTIVE | COMMUNITY
Start: 2025-08-28 | End: 1900-01-01

## 2025-09-04 RX ORDER — ATOGEPANT 60 MG/1
60 TABLET ORAL
Qty: 30 | Refills: 5 | Status: ACTIVE | COMMUNITY
Start: 2025-09-04 | End: 1900-01-01

## 2025-09-08 ENCOUNTER — APPOINTMENT (OUTPATIENT)
Age: 60
End: 2025-09-08
Payer: COMMERCIAL

## 2025-09-08 ENCOUNTER — APPOINTMENT (OUTPATIENT)
Dept: CT IMAGING | Facility: CLINIC | Age: 60
End: 2025-09-08
Payer: COMMERCIAL

## 2025-09-08 DIAGNOSIS — M54.9 DORSALGIA, UNSPECIFIED: ICD-10-CM

## 2025-09-08 PROCEDURE — 97814 ACUP 1/> W/ESTIM EA ADDL 15: CPT

## 2025-09-08 PROCEDURE — 97813 ACUP 1/> W/ESTIM 1ST 15 MIN: CPT

## 2025-09-08 PROCEDURE — 74177 CT ABD & PELVIS W/CONTRAST: CPT | Mod: 26

## 2025-09-12 ENCOUNTER — NON-APPOINTMENT (OUTPATIENT)
Age: 60
End: 2025-09-12

## 2025-09-15 ENCOUNTER — APPOINTMENT (OUTPATIENT)
Dept: GASTROENTEROLOGY | Facility: CLINIC | Age: 60
End: 2025-09-15
Payer: COMMERCIAL

## 2025-09-15 VITALS
SYSTOLIC BLOOD PRESSURE: 122 MMHG | HEART RATE: 70 BPM | WEIGHT: 105 LBS | BODY MASS INDEX: 17.93 KG/M2 | HEIGHT: 64 IN | RESPIRATION RATE: 16 BRPM | OXYGEN SATURATION: 97 % | DIASTOLIC BLOOD PRESSURE: 60 MMHG

## 2025-09-15 DIAGNOSIS — R10.13 EPIGASTRIC PAIN: ICD-10-CM

## 2025-09-15 DIAGNOSIS — K59.00 CONSTIPATION, UNSPECIFIED: ICD-10-CM

## 2025-09-15 PROCEDURE — 99214 OFFICE O/P EST MOD 30 MIN: CPT

## 2025-09-18 ENCOUNTER — APPOINTMENT (OUTPATIENT)
Dept: GASTROENTEROLOGY | Facility: CLINIC | Age: 60
End: 2025-09-18
Payer: COMMERCIAL

## 2025-09-18 VITALS
HEART RATE: 60 BPM | OXYGEN SATURATION: 100 % | SYSTOLIC BLOOD PRESSURE: 106 MMHG | WEIGHT: 107 LBS | DIASTOLIC BLOOD PRESSURE: 71 MMHG | HEIGHT: 64 IN | BODY MASS INDEX: 18.27 KG/M2

## 2025-09-18 DIAGNOSIS — K59.09 OTHER CONSTIPATION: ICD-10-CM

## 2025-09-18 PROCEDURE — 99214 OFFICE O/P EST MOD 30 MIN: CPT

## 2025-09-18 PROCEDURE — G2211 COMPLEX E/M VISIT ADD ON: CPT

## 2025-09-18 RX ORDER — POLYETHYLENE GLYCOL-3350 AND ELECTROLYTES 236; 6.74; 5.86; 2.97; 22.74 G/274.31G; G/274.31G; G/274.31G; G/274.31G; G/274.31G
236 POWDER, FOR SOLUTION ORAL
Qty: 2 | Refills: 0 | Status: ACTIVE | COMMUNITY
Start: 2025-09-18 | End: 1900-01-01

## 2025-09-19 ENCOUNTER — APPOINTMENT (OUTPATIENT)
Dept: PAIN MANAGEMENT | Facility: CLINIC | Age: 60
End: 2025-09-19
Payer: COMMERCIAL

## 2025-09-19 PROCEDURE — 99214 OFFICE O/P EST MOD 30 MIN: CPT

## (undated) DEVICE — TUBING SUCTION 20FT

## (undated) DEVICE — BIOPSY FORCEP RADIAL JAW 4 STANDARD WITH NEEDLE

## (undated) DEVICE — IRRIGATOR BIO SHIELD

## (undated) DEVICE — TUBING SUCTION CONN 6FT STERILE

## (undated) DEVICE — BALLOON US ENDO

## (undated) DEVICE — CLAMP BX HOT RAD JAW 3

## (undated) DEVICE — CATH IV SAFE BC 22G X 1" (BLUE)

## (undated) DEVICE — SUCTION YANKAUER NO CONTROL VENT

## (undated) DEVICE — SENSOR O2 FINGER ADULT

## (undated) DEVICE — SYR ALLIANCE II INFLATION 60ML

## (undated) DEVICE — ELCTR GROUNDING PAD ADULT COVIDIEN

## (undated) DEVICE — SYR LUER LOK 50CC

## (undated) DEVICE — TUBING IV SET GRAVITY 3Y 100" MACRO

## (undated) DEVICE — PACK IV START WITH CHG

## (undated) DEVICE — POLY TRAP ETRAP

## (undated) DEVICE — FOLEY HOLDER STATLOCK 2 WAY ADULT

## (undated) DEVICE — SOL INJ NS 0.9% 500ML 2 PORT

## (undated) DEVICE — BRUSH COLONOSCOPY CYTOLOGY

## (undated) DEVICE — FORCEP RADIAL JAW 4 JUMBO 2.8MM 3.2MM 240CM ORANGE DISP

## (undated) DEVICE — BITE BLOCK ADULT 20 X 27MM (GREEN)

## (undated) DEVICE — CATH IV SAFE BC 20G X 1.16" (PINK)